# Patient Record
Sex: FEMALE | Race: WHITE | NOT HISPANIC OR LATINO | URBAN - METROPOLITAN AREA
[De-identification: names, ages, dates, MRNs, and addresses within clinical notes are randomized per-mention and may not be internally consistent; named-entity substitution may affect disease eponyms.]

---

## 2017-10-10 ENCOUNTER — RECORDS - HEALTHEAST (OUTPATIENT)
Dept: LAB | Facility: CLINIC | Age: 82
End: 2017-10-10

## 2017-10-10 LAB
CHOLEST SERPL-MCNC: 181 MG/DL
FASTING STATUS PATIENT QL REPORTED: NO
HDLC SERPL-MCNC: 59 MG/DL
LDLC SERPL CALC-MCNC: 98 MG/DL
TRIGL SERPL-MCNC: 121 MG/DL

## 2018-10-11 ENCOUNTER — RECORDS - HEALTHEAST (OUTPATIENT)
Dept: LAB | Facility: CLINIC | Age: 83
End: 2018-10-11

## 2018-10-11 LAB
ALBUMIN SERPL-MCNC: 3.6 G/DL (ref 3.5–5)
ALP SERPL-CCNC: 106 U/L (ref 45–120)
ALT SERPL W P-5'-P-CCNC: 24 U/L (ref 0–45)
ANION GAP SERPL CALCULATED.3IONS-SCNC: 12 MMOL/L (ref 5–18)
AST SERPL W P-5'-P-CCNC: 36 U/L (ref 0–40)
BILIRUB SERPL-MCNC: 0.9 MG/DL (ref 0–1)
BUN SERPL-MCNC: 31 MG/DL (ref 8–28)
CALCIUM SERPL-MCNC: 10 MG/DL (ref 8.5–10.5)
CHLORIDE BLD-SCNC: 104 MMOL/L (ref 98–107)
CHOLEST SERPL-MCNC: 179 MG/DL
CO2 SERPL-SCNC: 25 MMOL/L (ref 22–31)
CREAT SERPL-MCNC: 1.28 MG/DL (ref 0.6–1.1)
FASTING STATUS PATIENT QL REPORTED: NO
GFR SERPL CREATININE-BSD FRML MDRD: 40 ML/MIN/1.73M2
GLUCOSE BLD-MCNC: 156 MG/DL (ref 70–125)
HDLC SERPL-MCNC: 59 MG/DL
LDLC SERPL CALC-MCNC: 94 MG/DL
POTASSIUM BLD-SCNC: 4.1 MMOL/L (ref 3.5–5)
PROT SERPL-MCNC: 7.9 G/DL (ref 6–8)
SODIUM SERPL-SCNC: 141 MMOL/L (ref 136–145)
TRIGL SERPL-MCNC: 129 MG/DL

## 2019-04-25 ENCOUNTER — RECORDS - HEALTHEAST (OUTPATIENT)
Dept: LAB | Facility: CLINIC | Age: 84
End: 2019-04-25

## 2019-04-25 LAB
BASOPHILS # BLD AUTO: 0 THOU/UL (ref 0–0.2)
BASOPHILS NFR BLD AUTO: 0 % (ref 0–2)
EOSINOPHIL # BLD AUTO: 0.2 THOU/UL (ref 0–0.4)
EOSINOPHIL NFR BLD AUTO: 3 % (ref 0–6)
ERYTHROCYTE [DISTWIDTH] IN BLOOD BY AUTOMATED COUNT: 14.2 % (ref 11–14.5)
FOLATE SERPL-MCNC: >20 NG/ML
HCT VFR BLD AUTO: 33.5 % (ref 35–47)
HGB BLD-MCNC: 11.4 G/DL (ref 12–16)
LYMPHOCYTES # BLD AUTO: 1.1 THOU/UL (ref 0.8–4.4)
LYMPHOCYTES NFR BLD AUTO: 23 % (ref 20–40)
MCH RBC QN AUTO: 34.8 PG (ref 27–34)
MCHC RBC AUTO-ENTMCNC: 34 G/DL (ref 32–36)
MCV RBC AUTO: 102 FL (ref 80–100)
MONOCYTES # BLD AUTO: 0.5 THOU/UL (ref 0–0.9)
MONOCYTES NFR BLD AUTO: 10 % (ref 2–10)
NEUTROPHILS # BLD AUTO: 3 THOU/UL (ref 2–7.7)
NEUTROPHILS NFR BLD AUTO: 64 % (ref 50–70)
PLATELET # BLD AUTO: 133 THOU/UL (ref 140–440)
PMV BLD AUTO: 10.5 FL (ref 8.5–12.5)
RBC # BLD AUTO: 3.28 MILL/UL (ref 3.8–5.4)
TSH SERPL DL<=0.005 MIU/L-ACNC: 1.91 UIU/ML (ref 0.3–5)
VIT B12 SERPL-MCNC: 613 PG/ML (ref 213–816)
WBC: 4.7 THOU/UL (ref 4–11)

## 2019-07-30 ENCOUNTER — RECORDS - HEALTHEAST (OUTPATIENT)
Dept: LAB | Facility: CLINIC | Age: 84
End: 2019-07-30

## 2019-07-30 LAB
ANION GAP SERPL CALCULATED.3IONS-SCNC: 9 MMOL/L (ref 5–18)
BUN SERPL-MCNC: 22 MG/DL (ref 8–28)
CALCIUM SERPL-MCNC: 9.9 MG/DL (ref 8.5–10.5)
CHLORIDE BLD-SCNC: 103 MMOL/L (ref 98–107)
CO2 SERPL-SCNC: 26 MMOL/L (ref 22–31)
CREAT SERPL-MCNC: 1.16 MG/DL (ref 0.6–1.1)
GFR SERPL CREATININE-BSD FRML MDRD: 45 ML/MIN/1.73M2
GLUCOSE BLD-MCNC: 126 MG/DL (ref 70–125)
POTASSIUM BLD-SCNC: 4 MMOL/L (ref 3.5–5)
SODIUM SERPL-SCNC: 138 MMOL/L (ref 136–145)

## 2019-10-24 ENCOUNTER — RECORDS - HEALTHEAST (OUTPATIENT)
Dept: LAB | Facility: CLINIC | Age: 84
End: 2019-10-24

## 2019-10-28 LAB — ANA SER QL: 2.8 U

## 2020-01-04 ENCOUNTER — ANESTHESIA - HEALTHEAST (OUTPATIENT)
Dept: SURGERY | Facility: HOSPITAL | Age: 85
End: 2020-01-04

## 2020-01-04 ENCOUNTER — SURGERY - HEALTHEAST (OUTPATIENT)
Dept: SURGERY | Facility: HOSPITAL | Age: 85
End: 2020-01-04

## 2020-01-04 ASSESSMENT — MIFFLIN-ST. JEOR
SCORE: 1223.33
SCORE: 1073.19

## 2020-01-05 ENCOUNTER — AMBULATORY - HEALTHEAST (OUTPATIENT)
Dept: OTHER | Facility: CLINIC | Age: 85
End: 2020-01-05

## 2020-01-08 ENCOUNTER — OFFICE VISIT - HEALTHEAST (OUTPATIENT)
Dept: GERIATRICS | Facility: CLINIC | Age: 85
End: 2020-01-08

## 2020-01-08 DIAGNOSIS — I10 ACCELERATED HYPERTENSION: ICD-10-CM

## 2020-01-08 DIAGNOSIS — Z79.01 ANTICOAGULATED: ICD-10-CM

## 2020-01-08 DIAGNOSIS — T14.8XXA OPEN WOUND: ICD-10-CM

## 2020-01-08 DIAGNOSIS — R52 PAIN MANAGEMENT: ICD-10-CM

## 2020-01-10 ENCOUNTER — RECORDS - HEALTHEAST (OUTPATIENT)
Dept: LAB | Facility: CLINIC | Age: 85
End: 2020-01-10

## 2020-01-13 ENCOUNTER — OFFICE VISIT - HEALTHEAST (OUTPATIENT)
Dept: GERIATRICS | Facility: CLINIC | Age: 85
End: 2020-01-13

## 2020-01-13 DIAGNOSIS — I10 ACCELERATED HYPERTENSION: ICD-10-CM

## 2020-01-13 DIAGNOSIS — T14.8XXA OPEN WOUND: ICD-10-CM

## 2020-01-13 DIAGNOSIS — Z79.01 ANTICOAGULATED: ICD-10-CM

## 2020-01-13 DIAGNOSIS — R52 PAIN MANAGEMENT: ICD-10-CM

## 2020-01-13 LAB
ANION GAP SERPL CALCULATED.3IONS-SCNC: 8 MMOL/L (ref 5–18)
BUN SERPL-MCNC: 23 MG/DL (ref 8–28)
CALCIUM SERPL-MCNC: 8.8 MG/DL (ref 8.5–10.5)
CHLORIDE BLD-SCNC: 106 MMOL/L (ref 98–107)
CK SERPL-CCNC: 48 U/L (ref 30–190)
CO2 SERPL-SCNC: 23 MMOL/L (ref 22–31)
CREAT SERPL-MCNC: 0.8 MG/DL (ref 0.6–1.1)
GFR SERPL CREATININE-BSD FRML MDRD: >60 ML/MIN/1.73M2
GLUCOSE BLD-MCNC: 92 MG/DL (ref 70–125)
POTASSIUM BLD-SCNC: 4 MMOL/L (ref 3.5–5)
SODIUM SERPL-SCNC: 137 MMOL/L (ref 136–145)

## 2020-01-17 ENCOUNTER — OFFICE VISIT - HEALTHEAST (OUTPATIENT)
Dept: GERIATRICS | Facility: CLINIC | Age: 85
End: 2020-01-17

## 2020-01-17 DIAGNOSIS — S73.004S: ICD-10-CM

## 2020-01-17 DIAGNOSIS — R52 PAIN MANAGEMENT: ICD-10-CM

## 2020-01-17 DIAGNOSIS — Z79.01 ANTICOAGULATED: ICD-10-CM

## 2020-01-17 DIAGNOSIS — I15.9 SECONDARY HYPERTENSION: ICD-10-CM

## 2020-01-21 ENCOUNTER — OFFICE VISIT - HEALTHEAST (OUTPATIENT)
Dept: GERIATRICS | Facility: CLINIC | Age: 85
End: 2020-01-21

## 2020-01-21 DIAGNOSIS — S73.004S: ICD-10-CM

## 2020-01-21 DIAGNOSIS — I10 ACCELERATED HYPERTENSION: ICD-10-CM

## 2020-01-21 DIAGNOSIS — R52 PAIN MANAGEMENT: ICD-10-CM

## 2020-01-29 ENCOUNTER — OFFICE VISIT - HEALTHEAST (OUTPATIENT)
Dept: GERIATRICS | Facility: CLINIC | Age: 85
End: 2020-01-29

## 2020-01-29 DIAGNOSIS — T79.6XXD TRAUMATIC RHABDOMYOLYSIS, SUBSEQUENT ENCOUNTER: ICD-10-CM

## 2020-01-29 DIAGNOSIS — I10 ACCELERATED HYPERTENSION: ICD-10-CM

## 2020-01-29 DIAGNOSIS — S73.014A POSTERIOR DISLOCATION OF RIGHT HIP, INITIAL ENCOUNTER (H): ICD-10-CM

## 2020-01-29 DIAGNOSIS — R54 AGE-RELATED PHYSICAL DEBILITY: ICD-10-CM

## 2020-01-30 ENCOUNTER — OFFICE VISIT - HEALTHEAST (OUTPATIENT)
Dept: GERIATRICS | Facility: CLINIC | Age: 85
End: 2020-01-30

## 2020-01-30 DIAGNOSIS — S73.004D DISLOCATION OF RIGHT HIP, SUBSEQUENT ENCOUNTER: ICD-10-CM

## 2020-01-30 DIAGNOSIS — T79.6XXD TRAUMATIC RHABDOMYOLYSIS, SUBSEQUENT ENCOUNTER: ICD-10-CM

## 2020-01-30 DIAGNOSIS — I10 ESSENTIAL HYPERTENSION: ICD-10-CM

## 2020-01-30 DIAGNOSIS — D64.9 ANEMIA, UNSPECIFIED TYPE: ICD-10-CM

## 2020-02-04 ENCOUNTER — OFFICE VISIT - HEALTHEAST (OUTPATIENT)
Dept: GERIATRICS | Facility: CLINIC | Age: 85
End: 2020-02-04

## 2020-02-04 DIAGNOSIS — R52 PAIN MANAGEMENT: ICD-10-CM

## 2020-02-04 DIAGNOSIS — Z79.01 ANTICOAGULATION ADEQUATE: ICD-10-CM

## 2020-02-04 DIAGNOSIS — S73.014A POSTERIOR DISLOCATION OF RIGHT HIP, INITIAL ENCOUNTER (H): ICD-10-CM

## 2020-02-11 ENCOUNTER — OFFICE VISIT - HEALTHEAST (OUTPATIENT)
Dept: GERIATRICS | Facility: CLINIC | Age: 85
End: 2020-02-11

## 2020-02-11 DIAGNOSIS — S73.014A POSTERIOR DISLOCATION OF RIGHT HIP, INITIAL ENCOUNTER (H): ICD-10-CM

## 2020-02-11 DIAGNOSIS — L89.153 PRESSURE INJURY OF SACRAL REGION, STAGE 3 (H): ICD-10-CM

## 2020-02-11 DIAGNOSIS — R52 PAIN MANAGEMENT: ICD-10-CM

## 2020-02-13 ENCOUNTER — OFFICE VISIT - HEALTHEAST (OUTPATIENT)
Dept: GERIATRICS | Facility: CLINIC | Age: 85
End: 2020-02-13

## 2020-02-13 DIAGNOSIS — T14.8XXA BRUISE: ICD-10-CM

## 2020-02-18 ENCOUNTER — OFFICE VISIT - HEALTHEAST (OUTPATIENT)
Dept: GERIATRICS | Facility: CLINIC | Age: 85
End: 2020-02-18

## 2020-02-18 DIAGNOSIS — R53.81 DEBILITY: ICD-10-CM

## 2020-02-18 DIAGNOSIS — R52 PAIN MANAGEMENT: ICD-10-CM

## 2020-02-18 DIAGNOSIS — S73.014A POSTERIOR DISLOCATION OF RIGHT HIP, INITIAL ENCOUNTER (H): ICD-10-CM

## 2020-02-25 ENCOUNTER — OFFICE VISIT - HEALTHEAST (OUTPATIENT)
Dept: GERIATRICS | Facility: CLINIC | Age: 85
End: 2020-02-25

## 2020-02-25 DIAGNOSIS — S73.014A POSTERIOR DISLOCATION OF RIGHT HIP, INITIAL ENCOUNTER (H): ICD-10-CM

## 2020-02-25 DIAGNOSIS — R52 PAIN MANAGEMENT: ICD-10-CM

## 2020-02-25 DIAGNOSIS — Z71.89 ENCOUNTER FOR HOME SAFETY REVIEW FOR INJURY PREVENTION: ICD-10-CM

## 2020-03-04 ENCOUNTER — COMMUNICATION - HEALTHEAST (OUTPATIENT)
Dept: GERIATRICS | Facility: CLINIC | Age: 85
End: 2020-03-04

## 2020-03-04 ENCOUNTER — RECORDS - HEALTHEAST (OUTPATIENT)
Dept: LAB | Facility: CLINIC | Age: 85
End: 2020-03-04

## 2020-03-04 ENCOUNTER — OFFICE VISIT - HEALTHEAST (OUTPATIENT)
Dept: GERIATRICS | Facility: CLINIC | Age: 85
End: 2020-03-04

## 2020-03-04 DIAGNOSIS — S73.014A POSTERIOR DISLOCATION OF RIGHT HIP, INITIAL ENCOUNTER (H): ICD-10-CM

## 2020-03-04 DIAGNOSIS — I10 ACCELERATED HYPERTENSION: ICD-10-CM

## 2020-03-04 DIAGNOSIS — R50.9 FEVER, UNSPECIFIED FEVER CAUSE: ICD-10-CM

## 2020-03-04 DIAGNOSIS — R52 PAIN MANAGEMENT: ICD-10-CM

## 2020-03-04 LAB
ALBUMIN UR-MCNC: ABNORMAL MG/DL
ANION GAP SERPL CALCULATED.3IONS-SCNC: 10 MMOL/L (ref 5–18)
APPEARANCE UR: ABNORMAL
BACTERIA #/AREA URNS HPF: ABNORMAL HPF
BILIRUB UR QL STRIP: NEGATIVE
BUN SERPL-MCNC: 38 MG/DL (ref 8–28)
CALCIUM SERPL-MCNC: 9.8 MG/DL (ref 8.5–10.5)
CHLORIDE BLD-SCNC: 101 MMOL/L (ref 98–107)
CK SERPL-CCNC: 26 U/L (ref 30–190)
CO2 SERPL-SCNC: 24 MMOL/L (ref 22–31)
COLOR UR AUTO: YELLOW
CREAT SERPL-MCNC: 1.18 MG/DL (ref 0.6–1.1)
ERYTHROCYTE [DISTWIDTH] IN BLOOD BY AUTOMATED COUNT: 15.8 % (ref 11–14.5)
GFR SERPL CREATININE-BSD FRML MDRD: 44 ML/MIN/1.73M2
GLUCOSE BLD-MCNC: 121 MG/DL (ref 70–125)
GLUCOSE UR STRIP-MCNC: NEGATIVE MG/DL
HCT VFR BLD AUTO: 30.7 % (ref 35–47)
HGB BLD-MCNC: 10.2 G/DL (ref 12–16)
HGB UR QL STRIP: ABNORMAL
HYALINE CASTS #/AREA URNS LPF: ABNORMAL LPF
KETONES UR STRIP-MCNC: NEGATIVE MG/DL
LEUKOCYTE ESTERASE UR QL STRIP: ABNORMAL
MCH RBC QN AUTO: 35.5 PG (ref 27–34)
MCHC RBC AUTO-ENTMCNC: 33.2 G/DL (ref 32–36)
MCV RBC AUTO: 107 FL (ref 80–100)
MUCOUS THREADS #/AREA URNS LPF: ABNORMAL LPF
NITRATE UR QL: NEGATIVE
PH UR STRIP: 5 [PH] (ref 4.5–8)
PLATELET # BLD AUTO: 145 THOU/UL (ref 140–440)
PMV BLD AUTO: 10.5 FL (ref 8.5–12.5)
POTASSIUM BLD-SCNC: 3.8 MMOL/L (ref 3.5–5)
RBC # BLD AUTO: 2.87 MILL/UL (ref 3.8–5.4)
RBC #/AREA URNS AUTO: ABNORMAL HPF
SODIUM SERPL-SCNC: 135 MMOL/L (ref 136–145)
SP GR UR STRIP: 1.02 (ref 1–1.03)
SQUAMOUS #/AREA URNS AUTO: ABNORMAL LPF
UROBILINOGEN UR STRIP-ACNC: ABNORMAL
WBC #/AREA URNS AUTO: >100 HPF
WBC CLUMPS #/AREA URNS HPF: PRESENT /[HPF]
WBC: 13.3 THOU/UL (ref 4–11)

## 2020-03-06 ENCOUNTER — OFFICE VISIT - HEALTHEAST (OUTPATIENT)
Dept: GERIATRICS | Facility: CLINIC | Age: 85
End: 2020-03-06

## 2020-03-06 ENCOUNTER — RECORDS - HEALTHEAST (OUTPATIENT)
Dept: LAB | Facility: CLINIC | Age: 85
End: 2020-03-06

## 2020-03-06 DIAGNOSIS — N39.0 URINARY TRACT INFECTION WITHOUT HEMATURIA, SITE UNSPECIFIED: ICD-10-CM

## 2020-03-06 LAB — BACTERIA SPEC CULT: ABNORMAL

## 2020-03-08 ENCOUNTER — RECORDS - HEALTHEAST (OUTPATIENT)
Dept: LAB | Facility: CLINIC | Age: 85
End: 2020-03-08

## 2020-03-08 LAB — URATE SERPL-MCNC: 6.8 MG/DL (ref 2–7.5)

## 2020-03-09 ENCOUNTER — OFFICE VISIT - HEALTHEAST (OUTPATIENT)
Dept: GERIATRICS | Facility: CLINIC | Age: 85
End: 2020-03-09

## 2020-03-09 DIAGNOSIS — R53.81 DEBILITY: ICD-10-CM

## 2020-03-09 DIAGNOSIS — S73.014A POSTERIOR DISLOCATION OF RIGHT HIP, INITIAL ENCOUNTER (H): ICD-10-CM

## 2020-03-09 DIAGNOSIS — L03.115 CELLULITIS OF RIGHT LOWER EXTREMITY: ICD-10-CM

## 2020-03-09 DIAGNOSIS — R52 PAIN MANAGEMENT: ICD-10-CM

## 2020-03-09 LAB
ANION GAP SERPL CALCULATED.3IONS-SCNC: 9 MMOL/L (ref 5–18)
BUN SERPL-MCNC: 34 MG/DL (ref 8–28)
CALCIUM SERPL-MCNC: 9.3 MG/DL (ref 8.5–10.5)
CHLORIDE BLD-SCNC: 103 MMOL/L (ref 98–107)
CO2 SERPL-SCNC: 23 MMOL/L (ref 22–31)
CREAT SERPL-MCNC: 1.41 MG/DL (ref 0.6–1.1)
GFR SERPL CREATININE-BSD FRML MDRD: 35 ML/MIN/1.73M2
GLUCOSE BLD-MCNC: 87 MG/DL (ref 70–125)
POTASSIUM BLD-SCNC: 4.8 MMOL/L (ref 3.5–5)
SODIUM SERPL-SCNC: 135 MMOL/L (ref 136–145)

## 2020-03-10 ENCOUNTER — RECORDS - HEALTHEAST (OUTPATIENT)
Dept: LAB | Facility: CLINIC | Age: 85
End: 2020-03-10

## 2020-03-10 LAB
ANION GAP SERPL CALCULATED.3IONS-SCNC: 7 MMOL/L (ref 5–18)
BUN SERPL-MCNC: 36 MG/DL (ref 8–28)
CALCIUM SERPL-MCNC: 9.3 MG/DL (ref 8.5–10.5)
CHLORIDE BLD-SCNC: 106 MMOL/L (ref 98–107)
CO2 SERPL-SCNC: 24 MMOL/L (ref 22–31)
CREAT SERPL-MCNC: 1.5 MG/DL (ref 0.6–1.1)
GFR SERPL CREATININE-BSD FRML MDRD: 33 ML/MIN/1.73M2
GLUCOSE BLD-MCNC: 82 MG/DL (ref 70–125)
POTASSIUM BLD-SCNC: 5 MMOL/L (ref 3.5–5)
SODIUM SERPL-SCNC: 137 MMOL/L (ref 136–145)

## 2020-03-11 ENCOUNTER — OFFICE VISIT - HEALTHEAST (OUTPATIENT)
Dept: GERIATRICS | Facility: CLINIC | Age: 85
End: 2020-03-11

## 2020-03-11 ENCOUNTER — RECORDS - HEALTHEAST (OUTPATIENT)
Dept: LAB | Facility: CLINIC | Age: 85
End: 2020-03-11

## 2020-03-11 DIAGNOSIS — L03.115 CELLULITIS OF RIGHT LOWER EXTREMITY: ICD-10-CM

## 2020-03-11 DIAGNOSIS — T79.6XXD TRAUMATIC RHABDOMYOLYSIS, SUBSEQUENT ENCOUNTER: ICD-10-CM

## 2020-03-11 LAB
ANION GAP SERPL CALCULATED.3IONS-SCNC: 7 MMOL/L (ref 5–18)
BUN SERPL-MCNC: 35 MG/DL (ref 8–28)
CALCIUM SERPL-MCNC: 9.4 MG/DL (ref 8.5–10.5)
CHLORIDE BLD-SCNC: 103 MMOL/L (ref 98–107)
CO2 SERPL-SCNC: 23 MMOL/L (ref 22–31)
CREAT SERPL-MCNC: 1.28 MG/DL (ref 0.6–1.1)
GFR SERPL CREATININE-BSD FRML MDRD: 40 ML/MIN/1.73M2
GLUCOSE BLD-MCNC: 86 MG/DL (ref 70–125)
POTASSIUM BLD-SCNC: 4.7 MMOL/L (ref 3.5–5)
SODIUM SERPL-SCNC: 133 MMOL/L (ref 136–145)

## 2020-03-11 RX ORDER — NYSTATIN 100000/G
1 POWDER (GRAM) TOPICAL 2 TIMES DAILY PRN
Status: SHIPPED | COMMUNITY
Start: 2020-03-11

## 2020-03-12 ENCOUNTER — AMBULATORY - HEALTHEAST (OUTPATIENT)
Dept: GERIATRICS | Facility: CLINIC | Age: 85
End: 2020-03-12

## 2020-04-16 ENCOUNTER — RECORDS - HEALTHEAST (OUTPATIENT)
Dept: LAB | Facility: CLINIC | Age: 85
End: 2020-04-16

## 2020-04-17 ENCOUNTER — OFFICE VISIT - HEALTHEAST (OUTPATIENT)
Dept: GERIATRICS | Facility: CLINIC | Age: 85
End: 2020-04-17

## 2020-04-17 DIAGNOSIS — L03.115 CELLULITIS OF RIGHT LOWER EXTREMITY: ICD-10-CM

## 2020-04-17 DIAGNOSIS — E87.8 ELECTROLYTE DISTURBANCE: ICD-10-CM

## 2020-04-17 DIAGNOSIS — L89.153 PRESSURE INJURY OF SACRAL REGION, STAGE 3 (H): ICD-10-CM

## 2020-04-17 DIAGNOSIS — T14.8XXA OPEN WOUND: ICD-10-CM

## 2020-04-17 LAB
ANION GAP SERPL CALCULATED.3IONS-SCNC: 9 MMOL/L (ref 5–18)
BUN SERPL-MCNC: 42 MG/DL (ref 8–28)
CALCIUM SERPL-MCNC: 8.7 MG/DL (ref 8.5–10.5)
CHLORIDE BLD-SCNC: 102 MMOL/L (ref 98–107)
CO2 SERPL-SCNC: 22 MMOL/L (ref 22–31)
CREAT SERPL-MCNC: 1.01 MG/DL (ref 0.6–1.1)
ERYTHROCYTE [DISTWIDTH] IN BLOOD BY AUTOMATED COUNT: 14.9 % (ref 11–14.5)
GFR SERPL CREATININE-BSD FRML MDRD: 52 ML/MIN/1.73M2
GLUCOSE BLD-MCNC: 92 MG/DL (ref 70–125)
HCT VFR BLD AUTO: 26.5 % (ref 35–47)
HGB BLD-MCNC: 8.5 G/DL (ref 12–16)
MAGNESIUM SERPL-MCNC: 1.7 MG/DL (ref 1.8–2.6)
MCH RBC QN AUTO: 35 PG (ref 27–34)
MCHC RBC AUTO-ENTMCNC: 32.1 G/DL (ref 32–36)
MCV RBC AUTO: 109 FL (ref 80–100)
PLATELET # BLD AUTO: 156 THOU/UL (ref 140–440)
PMV BLD AUTO: 11.2 FL (ref 8.5–12.5)
POTASSIUM BLD-SCNC: 4.9 MMOL/L (ref 3.5–5)
RBC # BLD AUTO: 2.43 MILL/UL (ref 3.8–5.4)
SODIUM SERPL-SCNC: 133 MMOL/L (ref 136–145)
WBC: 5.9 THOU/UL (ref 4–11)

## 2020-04-20 ENCOUNTER — OFFICE VISIT - HEALTHEAST (OUTPATIENT)
Dept: GERIATRICS | Facility: CLINIC | Age: 85
End: 2020-04-20

## 2020-04-20 DIAGNOSIS — L02.415 ABSCESS OF RIGHT LEG: ICD-10-CM

## 2020-04-20 DIAGNOSIS — D63.1 ANEMIA DUE TO STAGE 3 CHRONIC KIDNEY DISEASE (H): ICD-10-CM

## 2020-04-20 DIAGNOSIS — N18.30 ANEMIA DUE TO STAGE 3 CHRONIC KIDNEY DISEASE (H): ICD-10-CM

## 2020-04-20 DIAGNOSIS — S82.892D CLOSED FRACTURE OF LEFT ANKLE WITH ROUTINE HEALING, SUBSEQUENT ENCOUNTER: ICD-10-CM

## 2020-04-20 DIAGNOSIS — L72.3 SEBACEOUS CYST: ICD-10-CM

## 2020-04-20 DIAGNOSIS — L89.153 PRESSURE ULCER OF COCCYGEAL REGION, STAGE 3 (H): ICD-10-CM

## 2020-04-22 ENCOUNTER — RECORDS - HEALTHEAST (OUTPATIENT)
Dept: LAB | Facility: CLINIC | Age: 85
End: 2020-04-22

## 2020-04-23 ENCOUNTER — COMMUNICATION - HEALTHEAST (OUTPATIENT)
Dept: GERIATRICS | Facility: CLINIC | Age: 85
End: 2020-04-23

## 2020-04-23 LAB
FERRITIN SERPL-MCNC: 335 NG/ML (ref 10–130)
FOLATE SERPL-MCNC: 15.5 NG/ML
IRON SATN MFR SERPL: 34 % (ref 20–50)
IRON SERPL-MCNC: 81 UG/DL (ref 42–175)
MAGNESIUM SERPL-MCNC: 2.1 MG/DL (ref 1.8–2.6)
SODIUM SERPL-SCNC: 134 MMOL/L (ref 136–145)
TIBC SERPL-MCNC: 241 UG/DL (ref 313–563)
TRANSFERRIN SERPL-MCNC: 193 MG/DL (ref 212–360)
TSH SERPL DL<=0.005 MIU/L-ACNC: 1.82 UIU/ML (ref 0.3–5)
VIT B12 SERPL-MCNC: 482 PG/ML (ref 213–816)

## 2020-04-23 RX ORDER — MENTHOL 40 MG/ML
1 GEL TOPICAL 3 TIMES DAILY PRN
Status: SHIPPED | COMMUNITY
Start: 2020-04-23

## 2020-04-24 ENCOUNTER — OFFICE VISIT - HEALTHEAST (OUTPATIENT)
Dept: GERIATRICS | Facility: CLINIC | Age: 85
End: 2020-04-24

## 2020-04-24 ENCOUNTER — RECORDS - HEALTHEAST (OUTPATIENT)
Dept: LAB | Facility: CLINIC | Age: 85
End: 2020-04-24

## 2020-04-24 DIAGNOSIS — S82.832D CLOSED FRACTURE OF DISTAL END OF LEFT FIBULA WITH ROUTINE HEALING, UNSPECIFIED FRACTURE MORPHOLOGY, SUBSEQUENT ENCOUNTER: ICD-10-CM

## 2020-04-24 RX ORDER — FERROUS SULFATE 325(65) MG
1 TABLET ORAL
Status: SHIPPED | COMMUNITY
Start: 2020-04-24

## 2020-04-27 ENCOUNTER — COMMUNICATION - HEALTHEAST (OUTPATIENT)
Dept: GERIATRICS | Facility: CLINIC | Age: 85
End: 2020-04-27

## 2020-04-27 LAB — HGB BLD-MCNC: 10 G/DL (ref 12–16)

## 2020-04-30 ENCOUNTER — OFFICE VISIT - HEALTHEAST (OUTPATIENT)
Dept: GERIATRICS | Facility: CLINIC | Age: 85
End: 2020-04-30

## 2020-04-30 DIAGNOSIS — D50.0 BLOOD LOSS ANEMIA: ICD-10-CM

## 2020-04-30 DIAGNOSIS — R19.5 LOOSE STOOLS: ICD-10-CM

## 2020-04-30 DIAGNOSIS — R52 PAIN MANAGEMENT: ICD-10-CM

## 2020-04-30 DIAGNOSIS — R63.4 WEIGHT LOSS: ICD-10-CM

## 2020-04-30 DIAGNOSIS — S82.832D CLOSED FRACTURE OF DISTAL END OF LEFT FIBULA WITH ROUTINE HEALING, UNSPECIFIED FRACTURE MORPHOLOGY, SUBSEQUENT ENCOUNTER: ICD-10-CM

## 2020-04-30 RX ORDER — ACETAMINOPHEN 325 MG/1
TABLET ORAL EVERY 4 HOURS PRN
Status: SHIPPED | COMMUNITY
Start: 2020-04-30

## 2020-05-05 ENCOUNTER — OFFICE VISIT - HEALTHEAST (OUTPATIENT)
Dept: GERIATRICS | Facility: CLINIC | Age: 85
End: 2020-05-05

## 2020-05-05 DIAGNOSIS — R53.81 DEBILITY: ICD-10-CM

## 2020-05-05 DIAGNOSIS — R00.1 BRADYCARDIA: ICD-10-CM

## 2020-05-05 DIAGNOSIS — S82.832D CLOSED FRACTURE OF DISTAL END OF LEFT FIBULA WITH ROUTINE HEALING, UNSPECIFIED FRACTURE MORPHOLOGY, SUBSEQUENT ENCOUNTER: ICD-10-CM

## 2020-05-05 DIAGNOSIS — R63.4 WEIGHT LOSS: ICD-10-CM

## 2020-05-05 DIAGNOSIS — R19.5 LOOSE STOOLS: ICD-10-CM

## 2020-05-07 ENCOUNTER — OFFICE VISIT - HEALTHEAST (OUTPATIENT)
Dept: GERIATRICS | Facility: CLINIC | Age: 85
End: 2020-05-07

## 2020-05-07 DIAGNOSIS — R63.4 WEIGHT LOSS: ICD-10-CM

## 2020-05-07 DIAGNOSIS — R19.5 LOOSE STOOLS: ICD-10-CM

## 2020-05-07 DIAGNOSIS — S82.832D CLOSED FRACTURE OF DISTAL END OF LEFT FIBULA WITH ROUTINE HEALING, UNSPECIFIED FRACTURE MORPHOLOGY, SUBSEQUENT ENCOUNTER: ICD-10-CM

## 2020-05-07 DIAGNOSIS — I10 ESSENTIAL HYPERTENSION: ICD-10-CM

## 2020-05-07 DIAGNOSIS — R00.1 BRADYCARDIA: ICD-10-CM

## 2020-05-26 ENCOUNTER — OFFICE VISIT - HEALTHEAST (OUTPATIENT)
Dept: GERIATRICS | Facility: CLINIC | Age: 85
End: 2020-05-26

## 2020-05-26 DIAGNOSIS — I10 ESSENTIAL HYPERTENSION: ICD-10-CM

## 2020-05-26 DIAGNOSIS — R63.4 WEIGHT LOSS: ICD-10-CM

## 2020-05-26 DIAGNOSIS — R52 PAIN MANAGEMENT: ICD-10-CM

## 2020-05-26 DIAGNOSIS — L89.153 PRESSURE ULCER OF COCCYGEAL REGION, STAGE 3 (H): ICD-10-CM

## 2020-05-26 DIAGNOSIS — T14.8XXA OPEN WOUND: ICD-10-CM

## 2020-05-26 DIAGNOSIS — S82.832D CLOSED FRACTURE OF DISTAL END OF LEFT FIBULA WITH ROUTINE HEALING, UNSPECIFIED FRACTURE MORPHOLOGY, SUBSEQUENT ENCOUNTER: ICD-10-CM

## 2020-06-08 ENCOUNTER — AMBULATORY - HEALTHEAST (OUTPATIENT)
Dept: GERIATRICS | Facility: CLINIC | Age: 85
End: 2020-06-08

## 2020-07-21 ENCOUNTER — AMBULATORY - HEALTHEAST (OUTPATIENT)
Dept: VASCULAR SURGERY | Facility: CLINIC | Age: 85
End: 2020-07-21

## 2020-07-21 DIAGNOSIS — S71.101A OPEN WOUND OF RIGHT THIGH: ICD-10-CM

## 2020-07-21 DIAGNOSIS — S39.92XA: ICD-10-CM

## 2020-10-19 ENCOUNTER — RECORDS - HEALTHEAST (OUTPATIENT)
Dept: LAB | Facility: CLINIC | Age: 85
End: 2020-10-19

## 2020-10-20 LAB
ANION GAP SERPL CALCULATED.3IONS-SCNC: 10 MMOL/L (ref 5–18)
BUN SERPL-MCNC: 72 MG/DL (ref 8–28)
CALCIUM SERPL-MCNC: 9.4 MG/DL (ref 8.5–10.5)
CHLORIDE BLD-SCNC: 105 MMOL/L (ref 98–107)
CO2 SERPL-SCNC: 25 MMOL/L (ref 22–31)
CREAT SERPL-MCNC: 1.57 MG/DL (ref 0.6–1.1)
GFR SERPL CREATININE-BSD FRML MDRD: 31 ML/MIN/1.73M2
GLUCOSE BLD-MCNC: 126 MG/DL (ref 70–125)
POTASSIUM BLD-SCNC: 4.8 MMOL/L (ref 3.5–5)
SODIUM SERPL-SCNC: 140 MMOL/L (ref 136–145)

## 2021-05-25 ENCOUNTER — RECORDS - HEALTHEAST (OUTPATIENT)
Dept: ADMINISTRATIVE | Facility: CLINIC | Age: 86
End: 2021-05-25

## 2021-05-26 ENCOUNTER — RECORDS - HEALTHEAST (OUTPATIENT)
Dept: ADMINISTRATIVE | Facility: CLINIC | Age: 86
End: 2021-05-26

## 2021-05-27 ENCOUNTER — RECORDS - HEALTHEAST (OUTPATIENT)
Dept: ADMINISTRATIVE | Facility: CLINIC | Age: 86
End: 2021-05-27

## 2021-05-27 VITALS
TEMPERATURE: 98.5 F | DIASTOLIC BLOOD PRESSURE: 62 MMHG | OXYGEN SATURATION: 96 % | RESPIRATION RATE: 18 BRPM | SYSTOLIC BLOOD PRESSURE: 150 MMHG | HEART RATE: 69 BPM

## 2021-06-01 ENCOUNTER — RECORDS - HEALTHEAST (OUTPATIENT)
Dept: ADMINISTRATIVE | Facility: CLINIC | Age: 86
End: 2021-06-01

## 2021-06-04 VITALS
DIASTOLIC BLOOD PRESSURE: 81 MMHG | BODY MASS INDEX: 31.4 KG/M2 | SYSTOLIC BLOOD PRESSURE: 153 MMHG | HEART RATE: 66 BPM | WEIGHT: 160.8 LBS | TEMPERATURE: 98.6 F | RESPIRATION RATE: 18 BRPM | OXYGEN SATURATION: 97 %

## 2021-06-04 VITALS
DIASTOLIC BLOOD PRESSURE: 63 MMHG | SYSTOLIC BLOOD PRESSURE: 152 MMHG | HEART RATE: 58 BPM | RESPIRATION RATE: 18 BRPM | TEMPERATURE: 99 F | OXYGEN SATURATION: 98 % | WEIGHT: 160 LBS | BODY MASS INDEX: 31.25 KG/M2

## 2021-06-04 VITALS
RESPIRATION RATE: 18 BRPM | WEIGHT: 168.7 LBS | BODY MASS INDEX: 32.95 KG/M2 | DIASTOLIC BLOOD PRESSURE: 60 MMHG | SYSTOLIC BLOOD PRESSURE: 92 MMHG | TEMPERATURE: 98.3 F | OXYGEN SATURATION: 96 % | HEART RATE: 73 BPM

## 2021-06-04 VITALS
HEART RATE: 56 BPM | OXYGEN SATURATION: 96 % | TEMPERATURE: 98 F | DIASTOLIC BLOOD PRESSURE: 68 MMHG | WEIGHT: 163.4 LBS | SYSTOLIC BLOOD PRESSURE: 154 MMHG | BODY MASS INDEX: 31.91 KG/M2 | RESPIRATION RATE: 18 BRPM

## 2021-06-04 VITALS
WEIGHT: 162.8 LBS | HEART RATE: 76 BPM | BODY MASS INDEX: 31.79 KG/M2 | OXYGEN SATURATION: 18 % | DIASTOLIC BLOOD PRESSURE: 64 MMHG | SYSTOLIC BLOOD PRESSURE: 135 MMHG

## 2021-06-04 VITALS
DIASTOLIC BLOOD PRESSURE: 60 MMHG | WEIGHT: 163.4 LBS | TEMPERATURE: 98.6 F | HEART RATE: 54 BPM | RESPIRATION RATE: 18 BRPM | SYSTOLIC BLOOD PRESSURE: 134 MMHG | BODY MASS INDEX: 31.91 KG/M2 | OXYGEN SATURATION: 97 %

## 2021-06-04 VITALS
DIASTOLIC BLOOD PRESSURE: 52 MMHG | TEMPERATURE: 97.5 F | BODY MASS INDEX: 31.44 KG/M2 | OXYGEN SATURATION: 97 % | WEIGHT: 161 LBS | RESPIRATION RATE: 16 BRPM | SYSTOLIC BLOOD PRESSURE: 154 MMHG | HEART RATE: 52 BPM

## 2021-06-04 VITALS
SYSTOLIC BLOOD PRESSURE: 148 MMHG | DIASTOLIC BLOOD PRESSURE: 62 MMHG | BODY MASS INDEX: 31.05 KG/M2 | WEIGHT: 159 LBS | TEMPERATURE: 96.9 F | HEART RATE: 51 BPM | RESPIRATION RATE: 16 BRPM | OXYGEN SATURATION: 96 %

## 2021-06-04 VITALS
DIASTOLIC BLOOD PRESSURE: 67 MMHG | BODY MASS INDEX: 31.15 KG/M2 | WEIGHT: 159.5 LBS | RESPIRATION RATE: 18 BRPM | SYSTOLIC BLOOD PRESSURE: 146 MMHG | TEMPERATURE: 101 F | HEART RATE: 65 BPM | OXYGEN SATURATION: 97 %

## 2021-06-04 VITALS
HEART RATE: 64 BPM | TEMPERATURE: 97.1 F | RESPIRATION RATE: 18 BRPM | DIASTOLIC BLOOD PRESSURE: 63 MMHG | OXYGEN SATURATION: 97 % | WEIGHT: 162.2 LBS | BODY MASS INDEX: 31.68 KG/M2 | SYSTOLIC BLOOD PRESSURE: 151 MMHG

## 2021-06-04 VITALS
SYSTOLIC BLOOD PRESSURE: 149 MMHG | RESPIRATION RATE: 20 BRPM | TEMPERATURE: 98.7 F | HEART RATE: 66 BPM | OXYGEN SATURATION: 97 % | BODY MASS INDEX: 31.91 KG/M2 | DIASTOLIC BLOOD PRESSURE: 66 MMHG | WEIGHT: 163.4 LBS

## 2021-06-04 VITALS
HEART RATE: 57 BPM | RESPIRATION RATE: 18 BRPM | DIASTOLIC BLOOD PRESSURE: 60 MMHG | WEIGHT: 161.5 LBS | BODY MASS INDEX: 31.54 KG/M2 | TEMPERATURE: 98.7 F | SYSTOLIC BLOOD PRESSURE: 110 MMHG | OXYGEN SATURATION: 95 %

## 2021-06-04 VITALS
DIASTOLIC BLOOD PRESSURE: 60 MMHG | OXYGEN SATURATION: 95 % | RESPIRATION RATE: 16 BRPM | SYSTOLIC BLOOD PRESSURE: 142 MMHG | BODY MASS INDEX: 33.4 KG/M2 | TEMPERATURE: 97.7 F | WEIGHT: 171 LBS | HEART RATE: 59 BPM

## 2021-06-04 VITALS
WEIGHT: 163.2 LBS | OXYGEN SATURATION: 99 % | TEMPERATURE: 98.7 F | DIASTOLIC BLOOD PRESSURE: 80 MMHG | HEART RATE: 68 BPM | BODY MASS INDEX: 31.87 KG/M2 | RESPIRATION RATE: 18 BRPM | SYSTOLIC BLOOD PRESSURE: 140 MMHG

## 2021-06-04 VITALS — HEIGHT: 60 IN | WEIGHT: 156.9 LBS | BODY MASS INDEX: 30.8 KG/M2

## 2021-06-04 NOTE — ANESTHESIA PREPROCEDURE EVALUATION
Anesthesia Evaluation      Patient summary reviewed     Airway   Mallampati: IV   Pulmonary - negative ROS and normal exam                          Cardiovascular - normal exam  (+) hypertension, ,      Neuro/Psych - negative ROS     Endo/Other - negative ROS      GI/Hepatic/Renal - negative ROS   (+)   chronic renal disease ARF,      Other findings: Posterior dislocation of right hip with failed relocation attempt in ED today   Hgb 10.8  Plt 140  Cr 1.34  K 3.9      Dental    (+) poor dentition                       Anesthesia Plan  Planned anesthetic: general mask  Patient is NPO, per chart. Will plan for general mask anesthesia with propofol bolus with surgeon present in room. Will convert to GETA if paralysis is needed.     Fentanyl for analgesia  Zofran   Avoid toradol due to acute renal injury and age  ASA 3   Induction: intravenous   Anesthetic plan and risks discussed with: patient (Nephew present)    Post-op plan: routine recovery

## 2021-06-04 NOTE — PROGRESS NOTES
1/5/20  S: Patient is a 86 y/o female, 5', 190 lbs. seen at [facility], room 428, for the fitting and delivery of a  hip abduction orthosis for her right side on orders her Dr. Dariel Em MD for the treatment of DX: Right hip pain dislocation, right, initial encounter. Nurse Nancy RODRIGUEZ called to let us know that we had to fit patient for a right hip abduction brace, order were never sent to the on call. Nancy RODRIGUEZ stated that she needed a right hip abduction brace with flexion from 0 to 75 degrees and abduction of 15 degrees per doctors orders. Nancy RODRIGUEZ stated that she had a closed reduction of her right hip yesterday 1/4/20.    O: Patient presented in a supine position in her hospital bed at the time of my arrival talking with her nurse Nancy RODRIGUEZ. Patient was very alert throughout my visit today.  Nancy RODRIGUEZ stayed in the room and help me roll her in bed to fit the right hip abduction brace. Patient has kyphosis, short waisted due to her kyphosis and has a hernia that made fitting challenging.     G: Patient's hip abduction orthosis will assist in maintaining abduction and limiting flexion and extension of patient's hip in order to prevent future dislocation. Standard alignment of hip joint was used with specifics range of motion orders to be set at flexion of 0-75 degrees with 15 degrees of abduction angle of patient hip set for her orthosis.     A: Patient was initially measured to determine which size pelvic and thigh sections would properly fit her. I then assembled the patient's orthosis and set the ROM and abduction parameters to what was specified by the doctor settings for an posterior hip dislocation. I then fit the patient with the aid of her nurse Nancy RODRIGUEZ in order to maintain hip abduction as the patient was log-rolled onto her side. Patient's orthosis was then properly tightened, heights of pelvic and this sections were set, all straps were tightened and trimmed and all screws and  fasteners were properly tightened with [place]. After fitting was complete, orthosis fit was deemed optimal. I explain care and use instructions to the patient and her nurse. Patient reported she understood instructions and did not have further questions. Patient was able to sit up in the chair with the helmet of her nurse and myself with a walker. Patient moves very slow but stated that the brace felt fine. The brace will ride up if not placed on her right or if she tries to sit to far forward.      P: I gave the patient the use and care information and a contact number for the clinic to call with any questions or concerns. I asked the patient to let the nursing staff know if anything is needed with her hip brace while in the hospital.      LEONEL Cuba.

## 2021-06-04 NOTE — ANESTHESIA POSTPROCEDURE EVALUATION
Patient: Mitzi De Santiago  CLOSED REDUCTION, HIP  Anesthesia type: general    Patient location: PACU  Last vitals:   Vitals Value Taken Time   /63 1/4/2020  9:45 PM   Temp 37.2  C (98.9  F) 1/4/2020  9:31 PM   Pulse 75 1/4/2020  9:45 PM   Resp 29 1/4/2020  9:45 PM   SpO2 100 % 1/4/2020  9:45 PM   Vitals shown include unvalidated device data.  Post vital signs: stable  Level of consciousness: awake, alert, oriented and responds to simple questions  Post-anesthesia pain: pain controlled  Post-anesthesia nausea and vomiting: no  Pulmonary: unassisted, return to baseline  Cardiovascular: stable and blood pressure at baseline  Hydration: adequate  Anesthetic events: no    QCDR Measures:  ASA# 11 - Janine-op Cardiac Arrest: ASA11B - Patient did NOT experience unanticipated cardiac arrest  ASA# 12 - Janine-op Mortality Rate: ASA12B - Patient did NOT die  ASA# 13 - PACU Re-Intubation Rate: ASA13B - Patient did NOT require a new airway mgmt  ASA# 10 - Composite Anes Safety: ASA10A - No serious adverse event    Additional Notes:  Patient was resting in bed reporting no pain.

## 2021-06-04 NOTE — ANESTHESIA CARE TRANSFER NOTE
Last vitals:   Vitals:    01/04/20 1957   BP: 157/61   Pulse: 78   Resp: 19   Temp: 36.6  C (97.9  F)   SpO2: 99%     Patient's level of consciousness is drowsy  Spontaneous respirations: yes  Maintains airway independently: yes  Dentition unchanged: yes  Oropharynx: oropharynx clear of all foreign objects    QCDR Measures:  ASA# 20 - Surgical Safety Checklist: WHO surgical safety checklist completed prior to induction    PQRS# 430 - Adult PONV Prevention: 4558F - Pt received => 2 anti-emetic agents (different classes) preop & intraop  ASA# 8 - Peds PONV Prevention: NA - Not pediatric patient, not GA or 2 or more risk factors NOT present  PQRS# 424 - Janine-op Temp Management: NA - MAC anesthesia or case < 60 minutes  PQRS# 426 - PACU Transfer Protocol: - Transfer of care checklist used  ASA# 14 - Acute Post-op Pain: ASA14B - Patient did NOT experience pain >= 7 out of 10

## 2021-06-05 NOTE — PROGRESS NOTES
[facility]    Facility:   [facility] [[id number]]   Code Status: FULL CODE      CHIEF COMPLAINT/REASON FOR VISIT:  Chief Complaint   Patient presents with     Review Of Multiple Medical Conditions       HISTORY:      HPI: Mitzi is a 85 y.o. female undergoing physical and occupational therapy at [facility] transitional care unit.  She is with past medical history of hypertension, hematoma, chronic kidney disease stage II-III and osteoarthritis. TodayDebility PT OT therapy to evaluate for lymph wraps who presented to the emergency department for evaluation of right hip pain after mechanical fall.  She underwent a reduction under anesthesia.  She is with a history of a total right hip arthroplasty the femoral prosthesis is dislocated superiorly and no fracture.  She also had a hypertension crisis during her hospitalization per hospital report probably secondary to pain she did receive IV hydralazine.    Today she is seen as a first routine visit to review multiple medical issues.  She denies chest pain or shortness of breath.  She reports positive bowel movement and no issues with urination.  She is alert and oriented x4.  She does report that her pain is controlled with current medications but does get worse when she starts to move.  There was no incisions to be found it appears her dislocation was reduced in the OR to anesthesia.  She does have a coccyx wound and dressing change orders were changed today after being evaluated by the wound nurse.  She does have a hinged hip brace for when she is out of bed.    Past Medical History:   Diagnosis Date     Basal cell carcinoma 9/2000    forehead     Bunion      Cellulitis      Fatty liver      Hammer toe      Hypertension      Open wound(s) (multiple) of unspecified site(s), without mention of complication      Papilloma of breast              Family History   Problem Relation Age of Onset     No Medical  Problems Mother      Stroke Father      No Medical Problems Sister      No Medical Problems Daughter      No Medical Problems Maternal Grandmother      No Medical Problems Maternal Grandfather      No Medical Problems Paternal Grandmother      No Medical Problems Paternal Grandfather      No Medical Problems Maternal Aunt      No Medical Problems Paternal Aunt      BRCA 1/2 Neg Hx      Breast cancer Neg Hx      Cancer Neg Hx      Colon cancer Neg Hx      Endometrial cancer Neg Hx      Ovarian cancer Neg Hx      Social History     Socioeconomic History     Marital status:      Spouse name: Not on file     Number of children: Not on file     Years of education: Not on file     Highest education level: Not on file   Occupational History     Not on file   Social Needs     Financial resource strain: Not on file     Food insecurity:     Worry: Not on file     Inability: Not on file     Transportation needs:     Medical: Not on file     Non-medical: Not on file   Tobacco Use     Smoking status: Never Smoker     Smokeless tobacco: Never Used   Substance and Sexual Activity     Alcohol use: No     Drug use: Never     Sexual activity: Not on file   Lifestyle     Physical activity:     Days per week: Not on file     Minutes per session: Not on file     Stress: Not on file   Relationships     Social connections:     Talks on phone: Not on file     Gets together: Not on file     Attends Shinto service: Not on file     Active member of club or organization: Not on file     Attends meetings of clubs or organizations: Not on file     Relationship status: Not on file     Intimate partner violence:     Fear of current or ex partner: Not on file     Emotionally abused: Not on file     Physically abused: Not on file     Forced sexual activity: Not on file   Other Topics Concern     Not on file   Social History Narrative    Living at home alone.  and has 1 daughter in [place].          Review of Systems   Constitutional:  Positive for activity change and fatigue. Negative for appetite change and fever.   HENT: Negative for congestion.    Respiratory: Negative for cough, shortness of breath and wheezing.    Cardiovascular: Negative for chest pain and leg swelling.   Gastrointestinal: Negative for abdominal distention, abdominal pain, constipation, diarrhea and nausea.   Genitourinary: Negative for dysuria.   Musculoskeletal: Positive for arthralgias. Negative for back pain.   Skin: Positive for wound. Negative for color change.   Neurological: Negative for dizziness.   Psychiatric/Behavioral: Negative for agitation, behavioral problems and confusion.       Vitals:    01/08/20 1226   BP: 150/62   Pulse: 69   Resp: 18   Temp: 98.5  F (36.9  C)   SpO2: 96%       Physical Exam  Constitutional:       Appearance: She is well-developed.      Comments: Pleasant woman in no acute distress   HENT:      Head: Normocephalic.   Eyes:      Conjunctiva/sclera: Conjunctivae normal.   Neck:      Musculoskeletal: Normal range of motion.   Cardiovascular:      Rate and Rhythm: Normal rate and regular rhythm.      Heart sounds: Normal heart sounds. No murmur.   Pulmonary:      Effort: No respiratory distress.      Breath sounds: Normal breath sounds. No wheezing or rales.   Abdominal:      General: Bowel sounds are normal. There is no distension.      Palpations: Abdomen is soft.      Tenderness: There is no abdominal tenderness.   Musculoskeletal: Normal range of motion.      Comments: A hinged hip brace  Decreased range of motion left upper extremity she reports it has been this way for years.   Skin:     General: Skin is warm.      Comments: Coccyx wound being followed by the wound care team  Rash in her abdominal folds nystatin powder ordered   Neurological:      Mental Status: She is alert and oriented to person, place, and time.   Psychiatric:         Behavior: Behavior normal.           LABS:   Recent Results (from the past 240 hour(s))   Basic  Metabolic Panel   Result Value Ref Range    Sodium 140 136 - 145 mmol/L    Potassium 3.9 3.5 - 5.0 mmol/L    Chloride 103 98 - 107 mmol/L    CO2 21 (L) 22 - 31 mmol/L    Anion Gap, Calculation 16 5 - 18 mmol/L    Glucose 154 (H) 70 - 125 mg/dL    Calcium 9.6 8.5 - 10.5 mg/dL    BUN 17 8 - 28 mg/dL    Creatinine 1.34 (H) 0.60 - 1.10 mg/dL    GFR MDRD Af Amer 46 (L) >60 mL/min/1.73m2    GFR MDRD Non Af Amer 38 (L) >60 mL/min/1.73m2   Hepatic Profile   Result Value Ref Range    Bilirubin, Total 1.0 0.0 - 1.0 mg/dL    Bilirubin, Direct 0.4 <=0.5 mg/dL    Protein, Total 7.4 6.0 - 8.0 g/dL    Albumin 3.2 (L) 3.5 - 5.0 g/dL    Alkaline Phosphatase 109 45 - 120 U/L    AST 60 (H) 0 - 40 U/L    ALT 22 0 - 45 U/L   HM2 (CBC W/O DIFF)   Result Value Ref Range    WBC 9.7 4.0 - 11.0 thou/uL    RBC 3.09 (L) 3.80 - 5.40 mill/uL    Hemoglobin 10.8 (L) 12.0 - 16.0 g/dL    Hematocrit 32.1 (L) 35.0 - 47.0 %     (H) 80 - 100 fL    MCH 35.0 (H) 27.0 - 34.0 pg    MCHC 33.6 32.0 - 36.0 g/dL    RDW 14.3 11.0 - 14.5 %    Platelets 140 140 - 440 thou/uL    MPV 9.4 8.5 - 12.5 fL   Magnesium   Result Value Ref Range    Magnesium 1.7 (L) 1.8 - 2.6 mg/dL   CK   Result Value Ref Range    CK, Total 2,018 (HH) 30 - 190 U/L   C-reactive protein   Result Value Ref Range    CRP 3.9 (H) 0.0 - 0.8 mg/dL   Thyroid Cascade   Result Value Ref Range    TSH 1.52 0.30 - 5.00 uIU/mL   Urinalysis-UC if Indicated   Result Value Ref Range    Color, UA Yellow Colorless, Yellow, Straw, Light Yellow    Clarity, UA Clear Clear    Glucose, UA Negative Negative    Bilirubin, UA Negative Negative    Ketones, UA Negative Negative, 60 mg/dL    Specific Gravity, UA 1.013 1.001 - 1.030    Blood, UA Large (!) Negative    pH, UA 6.0 4.5 - 8.0    Protein, UA 30 mg/dL (!) Negative mg/dL    Urobilinogen, UA <2.0 E.U./dL <2.0 E.U./dL, 2.0 E.U./dL    Nitrite, UA Negative Negative    Leukocytes, UA Negative Negative    Bacteria, UA None Seen None Seen hpf    RBC, UA 0-2 None  Seen, 0-2 hpf    WBC, UA 0-5 None Seen, 0-5 hpf    Squam Epithel, UA 25-50 (!) None Seen, 0-5 lpf    Hyaline Casts, UA 0-5 0-5, None Seen lpf   Pregnancy, urine   Result Value Ref Range    Pregnancy Test, Urine Negative Negative   Comprehensive metabolic panel   Result Value Ref Range    Sodium 140 136 - 145 mmol/L    Potassium 3.9 3.5 - 5.0 mmol/L    Chloride 109 (H) 98 - 107 mmol/L    CO2 23 22 - 31 mmol/L    Anion Gap, Calculation 8 5 - 18 mmol/L    Glucose 107 70 - 125 mg/dL    BUN 22 8 - 28 mg/dL    Creatinine 1.16 (H) 0.60 - 1.10 mg/dL    GFR MDRD Af Amer 54 (L) >60 mL/min/1.73m2    GFR MDRD Non Af Amer 44 (L) >60 mL/min/1.73m2    Bilirubin, Total 1.0 0.0 - 1.0 mg/dL    Calcium 8.0 (L) 8.5 - 10.5 mg/dL    Protein, Total 5.3 (L) 6.0 - 8.0 g/dL    Albumin 2.3 (L) 3.5 - 5.0 g/dL    Alkaline Phosphatase 69 45 - 120 U/L    AST 87 (H) 0 - 40 U/L    ALT 31 0 - 45 U/L   CK Total   Result Value Ref Range    CK, Total 2,927 (HH) 30 - 190 U/L   HM1 (CBC with Diff)   Result Value Ref Range    WBC 4.8 4.0 - 11.0 thou/uL    RBC 2.10 (L) 3.80 - 5.40 mill/uL    Hemoglobin 7.5 (L) 12.0 - 16.0 g/dL    Hematocrit 22.5 (L) 35.0 - 47.0 %     (H) 80 - 100 fL    MCH 35.7 (H) 27.0 - 34.0 pg    MCHC 33.3 32.0 - 36.0 g/dL    RDW 14.5 11.0 - 14.5 %    Platelets 100 (L) 140 - 440 thou/uL    MPV 9.9 8.5 - 12.5 fL    Neutrophils % 77 (H) 50 - 70 %    Lymphocytes % 15 (L) 20 - 40 %    Monocytes % 7 2 - 10 %    Eosinophils % 0 0 - 6 %    Basophils % 0 0 - 2 %    Neutrophils Absolute 3.7 2.0 - 7.7 thou/uL    Lymphocytes Absolute 0.7 (L) 0.8 - 4.4 thou/uL    Monocytes Absolute 0.3 0.0 - 0.9 thou/uL    Eosinophils Absolute 0.0 0.0 - 0.4 thou/uL    Basophils Absolute 0.0 0.0 - 0.2 thou/uL   Basic Metabolic Panel   Result Value Ref Range    Sodium 139 136 - 145 mmol/L    Potassium 3.5 3.5 - 5.0 mmol/L    Chloride 110 (H) 98 - 107 mmol/L    CO2 24 22 - 31 mmol/L    Anion Gap, Calculation 5 5 - 18 mmol/L    Glucose 90 70 - 125 mg/dL     Calcium 7.6 (L) 8.5 - 10.5 mg/dL    BUN 21 8 - 28 mg/dL    Creatinine 0.95 0.60 - 1.10 mg/dL    GFR MDRD Af Amer >60 >60 mL/min/1.73m2    GFR MDRD Non Af Amer 56 (L) >60 mL/min/1.73m2   CK Total   Result Value Ref Range    CK, Total 2,295 (HH) 30 - 190 U/L   HM2(CBC w/o Differential)   Result Value Ref Range    WBC 4.7 4.0 - 11.0 thou/uL    RBC 2.02 (L) 3.80 - 5.40 mill/uL    Hemoglobin 7.0 (L) 12.0 - 16.0 g/dL    Hematocrit 21.4 (L) 35.0 - 47.0 %     (H) 80 - 100 fL    MCH 34.7 (H) 27.0 - 34.0 pg    MCHC 32.7 32.0 - 36.0 g/dL    RDW 14.8 (H) 11.0 - 14.5 %    Platelets 104 (L) 140 - 440 thou/uL    MPV 9.8 8.5 - 12.5 fL   Type and Screen   Result Value Ref Range    ABORh O POS     Antibody Screen Negative Negative   Magnesium   Result Value Ref Range    Magnesium 1.9 1.8 - 2.6 mg/dL   Hemoglobin   Result Value Ref Range    Hemoglobin 9.6 (L) 12.0 - 16.0 g/dL   Crossmatch   Result Value Ref Range    Crossmatch Compatible     Blood Expiration Date [id number]     Unit Type O Pos     Unit Number [id number]     Status Transfused     Component Red Blood Cells     PRODUCT CODE I3025R11     Issue Date and Time [id number]     Blood Type 5100     CODING SYSTEM [id number]    Crossmatch   Result Value Ref Range    Crossmatch Compatible     Blood Expiration Date 81834361101610     Unit Type O Pos     Unit Number [id number]     Status Transfused     Component Red Blood Cells     PRODUCT CODE H7245Q06     Issue Date and Time [id number]     Blood Type 5100     CODING SYSTEM [id number]        ASSESSMENT:      ICD-10-CM    1. Accelerated hypertension I10    2. Open wound(s) (multiple) of unspecified site(s), without mention of complication T14.8XXA    3. Pain management R52        PLAN:    Hypertension continue atenolol and hydrochlorothiazide blood pressure stable    Coccyx wound previous treatments have been discontinued and patient will have a wet to dry dressing change daily.  There was no necrosis noted patient  being followed by the wound care team    Pain management patient reports pain controlled on scheduled Tylenol    Anticoagulation currently on 81 mg twice daily    Reduction of a right dislocated hip patient underwent reduction in the OR under anesthesia failed attempt in the ER, patient with a hinged hip brace    Debility PT OT    Rhabdomyolysis-elevated CK on 1/6/2020 monitor CK and BMP levels    Electronically signed by: Nazanin Gonsalves CNP

## 2021-06-05 NOTE — PROGRESS NOTES
[facility]    Facility:   [facility] [[id number]]   Code Status: FULL CODE      CHIEF COMPLAINT/REASON FOR VISIT:  Chief Complaint   Patient presents with     Review Of Multiple Medical Conditions       HISTORY:      HPI: Mitzi is a 85 y.o. female undergoing physical and occupational therapy at [facility] transitional care unit.  She is with past medical history of hypertension, hematoma, chronic kidney disease stage II-III and osteoarthritis. Today[facility] PT OT therapy to evaluate for lymph wraps who presented to the emergency department for evaluation of right hip pain after mechanical fall.  She underwent a reduction under anesthesia.  She is with a history of a total right hip arthroplasty the femoral prosthesis is dislocated superiorly and no fracture.  She also had a hypertension crisis during her hospitalization per hospital report probably secondary to pain she did receive IV hydralazine.    Today she is seen as a routine visit to review multiple medical issues.  She denies chest pain or shortness of breath.  She reports no BM x 2 days. She denied feeling constipated and is passing flatus.  no issues with urination.  She is alert and oriented x4.   Her pain is controlled.  She does have a coccyx wound and being followed by the  wound nurse.  She does have a hinged hip brace for when she is out of bed.  She was noted to have +3 left lower extremity and +2 in the right lower extremity Compression was ordered.  Her weight is up  5 pounds since admission, awaiting a new weight.     BMP today was within normal limits.  Her CK is  48.  Today I am told by the therapist that he she is making progress, she was able to walk the bars on Friday.  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    Past Medical History:   Diagnosis Date     Basal cell carcinoma 9/2000    forehead     Bunion      Cellulitis      Fatty liver      Hammer toe      Hypertension       Open wound(s) (multiple) of unspecified site(s), without mention of complication      Papilloma of breast              Family History   Problem Relation Age of Onset     No Medical Problems Mother      Stroke Father      No Medical Problems Sister      No Medical Problems Daughter      No Medical Problems Maternal Grandmother      No Medical Problems Maternal Grandfather      No Medical Problems Paternal Grandmother      No Medical Problems Paternal Grandfather      No Medical Problems Maternal Aunt      No Medical Problems Paternal Aunt      BRCA 1/2 Neg Hx      Breast cancer Neg Hx      Cancer Neg Hx      Colon cancer Neg Hx      Endometrial cancer Neg Hx      Ovarian cancer Neg Hx      Social History     Socioeconomic History     Marital status:      Spouse name: Not on file     Number of children: Not on file     Years of education: Not on file     Highest education level: Not on file   Occupational History     Not on file   Social Needs     Financial resource strain: Not on file     Food insecurity:     Worry: Not on file     Inability: Not on file     Transportation needs:     Medical: Not on file     Non-medical: Not on file   Tobacco Use     Smoking status: Never Smoker     Smokeless tobacco: Never Used   Substance and Sexual Activity     Alcohol use: No     Drug use: Never     Sexual activity: Not on file   Lifestyle     Physical activity:     Days per week: Not on file     Minutes per session: Not on file     Stress: Not on file   Relationships     Social connections:     Talks on phone: Not on file     Gets together: Not on file     Attends Anabaptist service: Not on file     Active member of club or organization: Not on file     Attends meetings of clubs or organizations: Not on file     Relationship status: Not on file     Intimate partner violence:     Fear of current or ex partner: Not on file     Emotionally abused: Not on file     Physically abused: Not on file     Forced sexual activity: Not  on file   Other Topics Concern     Not on file   Social History Narrative    Living at home alone.  and has 1 daughter in [place].          Review of Systems   Constitutional: Positive for activity change and fatigue. Negative for appetite change and fever.   HENT: Negative for congestion.    Respiratory: Negative for cough, shortness of breath and wheezing.    Cardiovascular: Negative for chest pain and leg swelling.   Gastrointestinal: Negative for abdominal distention, abdominal pain, constipation, diarrhea and nausea.   Genitourinary: Negative for dysuria.   Musculoskeletal: Positive for arthralgias. Negative for back pain.   Skin: Positive for wound. Negative for color change.   Neurological: Negative for dizziness.   Psychiatric/Behavioral: Negative for agitation, behavioral problems and confusion.       Vitals:    01/13/20 0821   BP: 142/60   Pulse: (!) 59   Resp: 16   Temp: 97.7  F (36.5  C)   SpO2: 95%   Weight: 171 lb (77.6 kg)       Physical Exam  Constitutional:       Appearance: She is well-developed.      Comments: Pleasant woman in no acute distress   HENT:      Head: Normocephalic.   Eyes:      Conjunctiva/sclera: Conjunctivae normal.   Neck:      Musculoskeletal: Normal range of motion.   Cardiovascular:      Rate and Rhythm: Normal rate and regular rhythm.      Heart sounds: Normal heart sounds. No murmur.   Pulmonary:      Effort: No respiratory distress.      Breath sounds: Normal breath sounds. No wheezing or rales.   Abdominal:      General: Bowel sounds are normal. There is no distension.      Palpations: Abdomen is soft.      Tenderness: There is no abdominal tenderness.   Musculoskeletal: Normal range of motion.      Comments: A hinged hip brace  Decreased range of motion left upper extremity she reports it has been this way for years.   Skin:     General: Skin is warm.      Comments: Coccyx wound being followed by the wound care team  Rash in her abdominal folds nystatin powder ordered    Neurological:      Mental Status: She is alert and oriented to person, place, and time.   Psychiatric:         Behavior: Behavior normal.           LABS:   Recent Results (from the past 240 hour(s))   Basic Metabolic Panel   Result Value Ref Range    Sodium 140 136 - 145 mmol/L    Potassium 3.9 3.5 - 5.0 mmol/L    Chloride 103 98 - 107 mmol/L    CO2 21 (L) 22 - 31 mmol/L    Anion Gap, Calculation 16 5 - 18 mmol/L    Glucose 154 (H) 70 - 125 mg/dL    Calcium 9.6 8.5 - 10.5 mg/dL    BUN 17 8 - 28 mg/dL    Creatinine 1.34 (H) 0.60 - 1.10 mg/dL    GFR MDRD Af Amer 46 (L) >60 mL/min/1.73m2    GFR MDRD Non Af Amer 38 (L) >60 mL/min/1.73m2   Hepatic Profile   Result Value Ref Range    Bilirubin, Total 1.0 0.0 - 1.0 mg/dL    Bilirubin, Direct 0.4 <=0.5 mg/dL    Protein, Total 7.4 6.0 - 8.0 g/dL    Albumin 3.2 (L) 3.5 - 5.0 g/dL    Alkaline Phosphatase 109 45 - 120 U/L    AST 60 (H) 0 - 40 U/L    ALT 22 0 - 45 U/L   HM2 (CBC W/O DIFF)   Result Value Ref Range    WBC 9.7 4.0 - 11.0 thou/uL    RBC 3.09 (L) 3.80 - 5.40 mill/uL    Hemoglobin 10.8 (L) 12.0 - 16.0 g/dL    Hematocrit 32.1 (L) 35.0 - 47.0 %     (H) 80 - 100 fL    MCH 35.0 (H) 27.0 - 34.0 pg    MCHC 33.6 32.0 - 36.0 g/dL    RDW 14.3 11.0 - 14.5 %    Platelets 140 140 - 440 thou/uL    MPV 9.4 8.5 - 12.5 fL   Magnesium   Result Value Ref Range    Magnesium 1.7 (L) 1.8 - 2.6 mg/dL   CK   Result Value Ref Range    CK, Total 2,018 (HH) 30 - 190 U/L   C-reactive protein   Result Value Ref Range    CRP 3.9 (H) 0.0 - 0.8 mg/dL   Thyroid Cascade   Result Value Ref Range    TSH 1.52 0.30 - 5.00 uIU/mL   Urinalysis-UC if Indicated   Result Value Ref Range    Color, UA Yellow Colorless, Yellow, Straw, Light Yellow    Clarity, UA Clear Clear    Glucose, UA Negative Negative    Bilirubin, UA Negative Negative    Ketones, UA Negative Negative, 60 mg/dL    Specific Gravity, UA 1.013 1.001 - 1.030    Blood, UA Large (!) Negative    pH, UA 6.0 4.5 - 8.0    Protein, UA 30  mg/dL (!) Negative mg/dL    Urobilinogen, UA <2.0 E.U./dL <2.0 E.U./dL, 2.0 E.U./dL    Nitrite, UA Negative Negative    Leukocytes, UA Negative Negative    Bacteria, UA None Seen None Seen hpf    RBC, UA 0-2 None Seen, 0-2 hpf    WBC, UA 0-5 None Seen, 0-5 hpf    Squam Epithel, UA 25-50 (!) None Seen, 0-5 lpf    Hyaline Casts, UA 0-5 0-5, None Seen lpf   Pregnancy, urine   Result Value Ref Range    Pregnancy Test, Urine Negative Negative   Comprehensive metabolic panel   Result Value Ref Range    Sodium 140 136 - 145 mmol/L    Potassium 3.9 3.5 - 5.0 mmol/L    Chloride 109 (H) 98 - 107 mmol/L    CO2 23 22 - 31 mmol/L    Anion Gap, Calculation 8 5 - 18 mmol/L    Glucose 107 70 - 125 mg/dL    BUN 22 8 - 28 mg/dL    Creatinine 1.16 (H) 0.60 - 1.10 mg/dL    GFR MDRD Af Amer 54 (L) >60 mL/min/1.73m2    GFR MDRD Non Af Amer 44 (L) >60 mL/min/1.73m2    Bilirubin, Total 1.0 0.0 - 1.0 mg/dL    Calcium 8.0 (L) 8.5 - 10.5 mg/dL    Protein, Total 5.3 (L) 6.0 - 8.0 g/dL    Albumin 2.3 (L) 3.5 - 5.0 g/dL    Alkaline Phosphatase 69 45 - 120 U/L    AST 87 (H) 0 - 40 U/L    ALT 31 0 - 45 U/L   CK Total   Result Value Ref Range    CK, Total 2,927 (HH) 30 - 190 U/L   HM1 (CBC with Diff)   Result Value Ref Range    WBC 4.8 4.0 - 11.0 thou/uL    RBC 2.10 (L) 3.80 - 5.40 mill/uL    Hemoglobin 7.5 (L) 12.0 - 16.0 g/dL    Hematocrit 22.5 (L) 35.0 - 47.0 %     (H) 80 - 100 fL    MCH 35.7 (H) 27.0 - 34.0 pg    MCHC 33.3 32.0 - 36.0 g/dL    RDW 14.5 11.0 - 14.5 %    Platelets 100 (L) 140 - 440 thou/uL    MPV 9.9 8.5 - 12.5 fL    Neutrophils % 77 (H) 50 - 70 %    Lymphocytes % 15 (L) 20 - 40 %    Monocytes % 7 2 - 10 %    Eosinophils % 0 0 - 6 %    Basophils % 0 0 - 2 %    Neutrophils Absolute 3.7 2.0 - 7.7 thou/uL    Lymphocytes Absolute 0.7 (L) 0.8 - 4.4 thou/uL    Monocytes Absolute 0.3 0.0 - 0.9 thou/uL    Eosinophils Absolute 0.0 0.0 - 0.4 thou/uL    Basophils Absolute 0.0 0.0 - 0.2 thou/uL   Basic Metabolic Panel   Result Value  Ref Range    Sodium 139 136 - 145 mmol/L    Potassium 3.5 3.5 - 5.0 mmol/L    Chloride 110 (H) 98 - 107 mmol/L    CO2 24 22 - 31 mmol/L    Anion Gap, Calculation 5 5 - 18 mmol/L    Glucose 90 70 - 125 mg/dL    Calcium 7.6 (L) 8.5 - 10.5 mg/dL    BUN 21 8 - 28 mg/dL    Creatinine 0.95 0.60 - 1.10 mg/dL    GFR MDRD Af Amer >60 >60 mL/min/1.73m2    GFR MDRD Non Af Amer 56 (L) >60 mL/min/1.73m2   CK Total   Result Value Ref Range    CK, Total 2,295 (HH) 30 - 190 U/L   HM2(CBC w/o Differential)   Result Value Ref Range    WBC 4.7 4.0 - 11.0 thou/uL    RBC 2.02 (L) 3.80 - 5.40 mill/uL    Hemoglobin 7.0 (L) 12.0 - 16.0 g/dL    Hematocrit 21.4 (L) 35.0 - 47.0 %     (H) 80 - 100 fL    MCH 34.7 (H) 27.0 - 34.0 pg    MCHC 32.7 32.0 - 36.0 g/dL    RDW 14.8 (H) 11.0 - 14.5 %    Platelets 104 (L) 140 - 440 thou/uL    MPV 9.8 8.5 - 12.5 fL   Type and Screen   Result Value Ref Range    ABORh O POS     Antibody Screen Negative Negative   Magnesium   Result Value Ref Range    Magnesium 1.9 1.8 - 2.6 mg/dL   Hemoglobin   Result Value Ref Range    Hemoglobin 9.6 (L) 12.0 - 16.0 g/dL   Crossmatch   Result Value Ref Range    Crossmatch Compatible     Blood Expiration Date 20200130235900     Unit Type O Pos     Unit Number [id number]     Status Transfused     Component Red Blood Cells     PRODUCT CODE M0299X52     Issue Date and Time 02904004567334     Blood Type 5100     CODING SYSTEM [id number]    Crossmatch   Result Value Ref Range    Crossmatch Compatible     Blood Expiration Date 60920688773396     Unit Type O Pos     Unit Number [id number]     Status Transfused     Component Red Blood Cells     PRODUCT CODE V0586K37     Issue Date and Time 10104283689940     Blood Type 5100     CODING SYSTEM [id number]        ASSESSMENT:      ICD-10-CM    1. Accelerated hypertension I10    2. Open wound(s) (multiple) of unspecified site(s), without mention of complication T14.8XXA    3. Pain management R52    4. Anticoagulated Z79.01         PLAN:    Hypertension continue atenolol and hydrochlorothiazide blood pressure stable    Coccyx wound continue dressing changes she is being followed by the wound care team    Pain management patient reports pain controlled on scheduled Tylenol    Anticoagulation currently on 81 mg twice daily    Reduction of a right dislocated hip patient underwent reduction in the OR under anesthesia failed attempt in the ER, patient with a hinged hip brace    [facility] PT OT    Rhabdomyolysis-elevated CK on 1/6/2020 and now within normal limits at 48    Electronically signed by: Nazanin Gonsalves CNP

## 2021-06-05 NOTE — PROGRESS NOTES
[facility]    Facility:   [facility] [[id number]]   Code Status: FULL CODE      CHIEF COMPLAINT/REASON FOR VISIT:  Chief Complaint   Patient presents with     Review Of Multiple Medical Conditions       HISTORY:      HPI: Mitzi is a 85 y.o. female undergoing physical and occupational therapy at [facility] transitional care unit.  She is with past medical history of hypertension, hematoma, chronic kidney disease stage II-III and osteoarthritis. Today[facility] PT OT therapy to evaluate for lymph wraps who presented to the emergency department for evaluation of right hip pain after mechanical fall.  She underwent a reduction under anesthesia.  She is with a history of a total right hip arthroplasty the femoral prosthesis is dislocated superiorly and no fracture.  She also had a hypertension crisis during her hospitalization per hospital report probably secondary to pain she did receive IV hydralazine.    Today she is seen as a routine visit to review multiple medical issues.  She denies chest pain or shortness of breath.  She is moving her bowels and .  no issues with urination. [employer] forms also filled out with patient at the bedside,  She is alert and oriented x4.   Her pain is controlled.  She does have a coccyx wound and being followed by the  wound nurse. Per stqff the wound is 100 granulated measuring .5 X.5 with 2.5 depth.   She does have a hinged hip brace for when she is out of bed.  She is with lower extremity edema and wearing Compression   Her CK is WNL at  48.  She is making progress, she was able to walk the bars.  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     Past Medical History:   Diagnosis Date     Basal cell carcinoma 9/2000    forehead     Bunion      Cellulitis      Fatty liver      Hammer toe      Hypertension      Open wound(s) (multiple) of unspecified site(s), without mention of complication      Papilloma of breast               Family History   Problem Relation Age of Onset     No Medical Problems Mother      Stroke Father      No Medical Problems Sister      No Medical Problems Daughter      No Medical Problems Maternal Grandmother      No Medical Problems Maternal Grandfather      No Medical Problems Paternal Grandmother      No Medical Problems Paternal Grandfather      No Medical Problems Maternal Aunt      No Medical Problems Paternal Aunt      BRCA 1/2 Neg Hx      Breast cancer Neg Hx      Cancer Neg Hx      Colon cancer Neg Hx      Endometrial cancer Neg Hx      Ovarian cancer Neg Hx      Social History     Socioeconomic History     Marital status:      Spouse name: Not on file     Number of children: Not on file     Years of education: Not on file     Highest education level: Not on file   Occupational History     Not on file   Social Needs     Financial resource strain: Not on file     Food insecurity:     Worry: Not on file     Inability: Not on file     Transportation needs:     Medical: Not on file     Non-medical: Not on file   Tobacco Use     Smoking status: Never Smoker     Smokeless tobacco: Never Used   Substance and Sexual Activity     Alcohol use: No     Drug use: Never     Sexual activity: Not on file   Lifestyle     Physical activity:     Days per week: Not on file     Minutes per session: Not on file     Stress: Not on file   Relationships     Social connections:     Talks on phone: Not on file     Gets together: Not on file     Attends Orthodox service: Not on file     Active member of club or organization: Not on file     Attends meetings of clubs or organizations: Not on file     Relationship status: Not on file     Intimate partner violence:     Fear of current or ex partner: Not on file     Emotionally abused: Not on file     Physically abused: Not on file     Forced sexual activity: Not on file   Other Topics Concern     Not on file   Social History Narrative    Living at home alone.   and has 1 daughter in [place].          Review of Systems   Constitutional: Positive for activity change and fatigue. Negative for appetite change and fever.   HENT: Negative for congestion.    Respiratory: Negative for cough, shortness of breath and wheezing.    Cardiovascular: Negative for chest pain and leg swelling.   Gastrointestinal: Negative for abdominal distention, abdominal pain, constipation, diarrhea and nausea.   Genitourinary: Negative for dysuria.   Musculoskeletal: Positive for arthralgias. Negative for back pain.   Skin: Positive for wound. Negative for color change.   Neurological: Negative for dizziness.   Psychiatric/Behavioral: Negative for agitation, behavioral problems and confusion.       Vitals:    01/17/20 0916   BP: 110/60   Pulse: (!) 57   Resp: 18   Temp: 98.7  F (37.1  C)   SpO2: 95%   Weight: 161 lb 8 oz (73.3 kg)       Physical Exam  Constitutional:       Appearance: She is well-developed.      Comments: Pleasant woman in no acute distress   HENT:      Head: Normocephalic.   Eyes:      Conjunctiva/sclera: Conjunctivae normal.   Neck:      Musculoskeletal: Normal range of motion.   Cardiovascular:      Rate and Rhythm: Normal rate and regular rhythm.      Heart sounds: Normal heart sounds. No murmur.   Pulmonary:      Effort: No respiratory distress.      Breath sounds: Normal breath sounds. No wheezing or rales.   Abdominal:      General: Bowel sounds are normal. There is no distension.      Palpations: Abdomen is soft.      Tenderness: There is no abdominal tenderness.   Musculoskeletal: Normal range of motion.      Comments: A hinged hip brace  Decreased range of motion left upper extremity she reports it has been this way for years.   Skin:     General: Skin is warm.      Comments: Coccyx wound being followed by the wound care team  Rash in her abdominal folds nystatin powder ordered   Neurological:      Mental Status: She is alert and oriented to person, place, and time.    Psychiatric:         Behavior: Behavior normal.           LABS:   Recent Results (from the past 240 hour(s))   Crossmatch   Result Value Ref Range    Crossmatch Compatible     Blood Expiration Date 20200130235900     Unit Type O Pos     Unit Number [id number]     Status Transfused     Component Red Blood Cells     PRODUCT CODE J6050F49     Issue Date and Time [id number]     Blood Type 5100     CODING SYSTEM [id number]    Crossmatch   Result Value Ref Range    Crossmatch Compatible     Blood Expiration Date 98779388711501     Unit Type O Pos     Unit Number [id number]     Status Transfused     Component Red Blood Cells     PRODUCT CODE M1777H83     Issue Date and Time [id number]     Blood Type 5100     CODING SYSTEM [id number]    Basic Metabolic Panel   Result Value Ref Range    Sodium 137 136 - 145 mmol/L    Potassium 4.0 3.5 - 5.0 mmol/L    Chloride 106 98 - 107 mmol/L    CO2 23 22 - 31 mmol/L    Anion Gap, Calculation 8 5 - 18 mmol/L    Glucose 92 70 - 125 mg/dL    Calcium 8.8 8.5 - 10.5 mg/dL    BUN 23 8 - 28 mg/dL    Creatinine 0.80 0.60 - 1.10 mg/dL    GFR MDRD Af Amer >60 >60 mL/min/1.73m2    GFR MDRD Non Af Amer >60 >60 mL/min/1.73m2   CK Total   Result Value Ref Range    CK, Total 48 30 - 190 U/L       ASSESSMENT:      ICD-10-CM    1. Secondary hypertension I15.9    2. Pain management R52    3. Anticoagulated Z79.01    4. Dislocated hip, right, sequela S73.004S        PLAN:      Greater than 20 minutes spent with patient working on [employer] paperwork.     Hypertension continue atenolol and hydrochlorothiazide blood pressure stable    Coccyx wound continue dressing changes she is being followed by the wound care team    Pain management patient reports pain controlled on scheduled Tylenol    Anticoagulation currently on 81 mg twice daily    Reduction of a right dislocated hip patient underwent reduction in the OR under anesthesia failed attempt in the ER, patient with a hinged hip  brace    [facility] PT OT    Rhabdomyolysis-elevated CK on 1/6/2020 and now within normal limits at 48    Electronically signed by: Nazanin Gonsalves CNP

## 2021-06-05 NOTE — PROGRESS NOTES
[facility]    Facility:   [facility] [[id number]]   Code Status: FULL CODE      CHIEF COMPLAINT/REASON FOR VISIT:  Chief Complaint   Patient presents with     Review Of Multiple Medical Conditions       HISTORY:      HPI: Mitzi is a 85 y.o. female undergoing physical and occupational therapy at [facility] transitional care unit.  She is with past medical history of hypertension, hematoma, chronic kidney disease stage II-III and osteoarthritis. TodayDebility PT OT therapy to evaluate for lymph wraps who presented to the emergency department for evaluation of right hip pain after mechanical fall.  She underwent a reduction under anesthesia.  She is with a history of a total right hip arthroplasty the femoral prosthesis is dislocated superiorly and no fracture.  She also had a hypertension crisis during her hospitalization per hospital report probably secondary to pain she did receive IV hydralazine.    Today she is seen as a routine visit to review multiple medical issues.  She denies chest pain or shortness of breath.  She is moving her bowels and has  no issues with urination.  She is alert and oriented x4.   Her pain is controlled.  She does have a coccyx wound and being followed by the  wound nurse.   She did follow-up with orthopedics on 1/20/2020 and she is to continue with her abductor brace for 2 more weeks orthopedics had no concerns and she will follow-up as needed.   She does have lower extremity edema and wearing compression during the day.   Her CK is WNL at  48.  She is a pivot transfer and walking the bars in therapy. Her weight is up 3 pounds in the last week.     Past Medical History:   Diagnosis Date     Basal cell carcinoma 9/2000    forehead     Bunion      Cellulitis      Fatty liver      Hammer toe      Hypertension      Open wound(s) (multiple) of unspecified site(s), without mention of complication      Papilloma of breast               Family History   Problem Relation Age of Onset     No Medical Problems Mother      Stroke Father      No Medical Problems Sister      No Medical Problems Daughter      No Medical Problems Maternal Grandmother      No Medical Problems Maternal Grandfather      No Medical Problems Paternal Grandmother      No Medical Problems Paternal Grandfather      No Medical Problems Maternal Aunt      No Medical Problems Paternal Aunt      BRCA 1/2 Neg Hx      Breast cancer Neg Hx      Cancer Neg Hx      Colon cancer Neg Hx      Endometrial cancer Neg Hx      Ovarian cancer Neg Hx      Social History     Socioeconomic History     Marital status:      Spouse name: Not on file     Number of children: Not on file     Years of education: Not on file     Highest education level: Not on file   Occupational History     Not on file   Social Needs     Financial resource strain: Not on file     Food insecurity:     Worry: Not on file     Inability: Not on file     Transportation needs:     Medical: Not on file     Non-medical: Not on file   Tobacco Use     Smoking status: Never Smoker     Smokeless tobacco: Never Used   Substance and Sexual Activity     Alcohol use: No     Drug use: Never     Sexual activity: Not on file   Lifestyle     Physical activity:     Days per week: Not on file     Minutes per session: Not on file     Stress: Not on file   Relationships     Social connections:     Talks on phone: Not on file     Gets together: Not on file     Attends Quaker service: Not on file     Active member of club or organization: Not on file     Attends meetings of clubs or organizations: Not on file     Relationship status: Not on file     Intimate partner violence:     Fear of current or ex partner: Not on file     Emotionally abused: Not on file     Physically abused: Not on file     Forced sexual activity: Not on file   Other Topics Concern     Not on file   Social History Narrative    Living at home alone.  and has 1  daughter in [place].          Review of Systems   Constitutional: Positive for activity change and fatigue. Negative for appetite change and fever.   HENT: Negative for congestion.    Respiratory: Negative for cough, shortness of breath and wheezing.    Cardiovascular: Negative for chest pain and leg swelling.   Gastrointestinal: Negative for abdominal distention, abdominal pain, constipation, diarrhea and nausea.   Genitourinary: Negative for dysuria.   Musculoskeletal: Positive for arthralgias. Negative for back pain.   Skin: Positive for wound. Negative for color change.   Neurological: Negative for dizziness.   Psychiatric/Behavioral: Negative for agitation, behavioral problems and confusion.       Vitals:    01/29/20 0750   BP: 134/60   Pulse: (!) 54   Resp: 18   Temp: 98.6  F (37  C)   SpO2: 97%   Weight: 163 lb 6.4 oz (74.1 kg)       Physical Exam  Constitutional:       Appearance: She is well-developed.      Comments: Pleasant woman in no acute distress   HENT:      Head: Normocephalic.   Eyes:      Conjunctiva/sclera: Conjunctivae normal.   Neck:      Musculoskeletal: Normal range of motion.   Cardiovascular:      Rate and Rhythm: Normal rate and regular rhythm.      Heart sounds: Normal heart sounds. No murmur.   Pulmonary:      Effort: No respiratory distress.      Breath sounds: Normal breath sounds. No wheezing or rales.   Abdominal:      General: Bowel sounds are normal. There is no distension.      Palpations: Abdomen is soft.      Tenderness: There is no abdominal tenderness.   Musculoskeletal: Normal range of motion.      Comments: A hinged hip brace  Decreased range of motion left upper extremity she reports it has been this way for years.   Skin:     General: Skin is warm.      Comments: Coccyx wound being followed by the wound care team  Rash in her abdominal folds nystatin powder ordered   Neurological:      Mental Status: She is alert and oriented to person, place, and time.   Psychiatric:          Behavior: Behavior normal.           LABS:   No results found for this or any previous visit (from the past 240 hour(s)).    ASSESSMENT:      ICD-10-CM    1. Traumatic rhabdomyolysis, subsequent encounter T79.6XXD    2. Accelerated hypertension I10    3. Posterior dislocation of right hip, initial encounter (H) S73.014A    4. Age-related physical debility R54        PLAN:      Hypertension continue atenolol and hydrochlorothiazide blood pressure stable    Coccyx wound continue dressing changes she is being followed by the wound care team    Pain management patient reports pain controlled on scheduled Tylenol    Anticoagulation currently on 81 mg twice daily    Reduction of a right dislocated hip patient underwent reduction in the OR under anesthesia failed attempt in the ER, patient with a hinged hip brace    Debility PT OT    Rhabdomyolysis-elevated CK on 1/6/2020 and now within normal limits at 48    Electronically signed by: Nazanin Gonsalves CNP

## 2021-06-05 NOTE — PROGRESS NOTES
[facility]    Facility:   [facility] [[id number]]   Code Status: FULL CODE      CHIEF COMPLAINT/REASON FOR VISIT:  Chief Complaint   Patient presents with     Review Of Multiple Medical Conditions       HISTORY:      HPI: Mitzi is a 85 y.o. female undergoing physical and occupational therapy at [facility] transitional care unit.  She is with past medical history of hypertension, hematoma, chronic kidney disease stage II-III and osteoarthritis. TodayDebility PT OT therapy to evaluate for lymph wraps who presented to the emergency department for evaluation of right hip pain after mechanical fall.  She underwent a reduction under anesthesia.  She is with a history of a total right hip arthroplasty the femoral prosthesis is dislocated superiorly and no fracture.  She also had a hypertension crisis during her hospitalization per hospital report probably secondary to pain she did receive IV hydralazine.    Today she is seen as a routine visit to review multiple medical issues.  She denies chest pain or shortness of breath.  She is moving her bowels and has  no issues with urination.  She is alert and oriented x4.   Her pain is controlled.  She does have a coccyx wound and being followed by the  wound nurse.  Per staff her wound is 100% granulated.  She did follow-up with orthopedics on 1/20/2020 and she is to continue with her abductor brace for 2 more weeks orthopedics had no concerns and she will follow-up as needed.   She is with lower extremity edema and wearing Compression   Her CK is WNL at  48.  She is making progress, she was able to walk the bars.  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     Past Medical History:   Diagnosis Date     Basal cell carcinoma 9/2000    forehead     Bunion      Cellulitis      Fatty liver      Hammer toe      Hypertension      Open wound(s) (multiple) of unspecified site(s), without mention of complication       Papilloma of breast              Family History   Problem Relation Age of Onset     No Medical Problems Mother      Stroke Father      No Medical Problems Sister      No Medical Problems Daughter      No Medical Problems Maternal Grandmother      No Medical Problems Maternal Grandfather      No Medical Problems Paternal Grandmother      No Medical Problems Paternal Grandfather      No Medical Problems Maternal Aunt      No Medical Problems Paternal Aunt      BRCA 1/2 Neg Hx      Breast cancer Neg Hx      Cancer Neg Hx      Colon cancer Neg Hx      Endometrial cancer Neg Hx      Ovarian cancer Neg Hx      Social History     Socioeconomic History     Marital status:      Spouse name: Not on file     Number of children: Not on file     Years of education: Not on file     Highest education level: Not on file   Occupational History     Not on file   Social Needs     Financial resource strain: Not on file     Food insecurity:     Worry: Not on file     Inability: Not on file     Transportation needs:     Medical: Not on file     Non-medical: Not on file   Tobacco Use     Smoking status: Never Smoker     Smokeless tobacco: Never Used   Substance and Sexual Activity     Alcohol use: No     Drug use: Never     Sexual activity: Not on file   Lifestyle     Physical activity:     Days per week: Not on file     Minutes per session: Not on file     Stress: Not on file   Relationships     Social connections:     Talks on phone: Not on file     Gets together: Not on file     Attends Jehovah's witness service: Not on file     Active member of club or organization: Not on file     Attends meetings of clubs or organizations: Not on file     Relationship status: Not on file     Intimate partner violence:     Fear of current or ex partner: Not on file     Emotionally abused: Not on file     Physically abused: Not on file     Forced sexual activity: Not on file   Other Topics Concern     Not on file   Social History Narrative    Living at  home alone.  and has 1 daughter in [place].          Review of Systems   Constitutional: Positive for activity change and fatigue. Negative for appetite change and fever.   HENT: Negative for congestion.    Respiratory: Negative for cough, shortness of breath and wheezing.    Cardiovascular: Negative for chest pain and leg swelling.   Gastrointestinal: Negative for abdominal distention, abdominal pain, constipation, diarrhea and nausea.   Genitourinary: Negative for dysuria.   Musculoskeletal: Positive for arthralgias. Negative for back pain.   Skin: Positive for wound. Negative for color change.   Neurological: Negative for dizziness.   Psychiatric/Behavioral: Negative for agitation, behavioral problems and confusion.       Vitals:    01/21/20 0839   BP: 154/68   Pulse: (!) 56   Resp: 18   Temp: 98  F (36.7  C)   SpO2: 96%   Weight: 163 lb 6.4 oz (74.1 kg)       Physical Exam  Constitutional:       Appearance: She is well-developed.      Comments: Pleasant woman in no acute distress   HENT:      Head: Normocephalic.   Eyes:      Conjunctiva/sclera: Conjunctivae normal.   Neck:      Musculoskeletal: Normal range of motion.   Cardiovascular:      Rate and Rhythm: Normal rate and regular rhythm.      Heart sounds: Normal heart sounds. No murmur.   Pulmonary:      Effort: No respiratory distress.      Breath sounds: Normal breath sounds. No wheezing or rales.   Abdominal:      General: Bowel sounds are normal. There is no distension.      Palpations: Abdomen is soft.      Tenderness: There is no abdominal tenderness.   Musculoskeletal: Normal range of motion.      Comments: A hinged hip brace  Decreased range of motion left upper extremity she reports it has been this way for years.   Skin:     General: Skin is warm.      Comments: Coccyx wound being followed by the wound care team  Rash in her abdominal folds nystatin powder ordered   Neurological:      Mental Status: She is alert and oriented to person, place,  and time.   Psychiatric:         Behavior: Behavior normal.           LABS:   Recent Results (from the past 240 hour(s))   Basic Metabolic Panel   Result Value Ref Range    Sodium 137 136 - 145 mmol/L    Potassium 4.0 3.5 - 5.0 mmol/L    Chloride 106 98 - 107 mmol/L    CO2 23 22 - 31 mmol/L    Anion Gap, Calculation 8 5 - 18 mmol/L    Glucose 92 70 - 125 mg/dL    Calcium 8.8 8.5 - 10.5 mg/dL    BUN 23 8 - 28 mg/dL    Creatinine 0.80 0.60 - 1.10 mg/dL    GFR MDRD Af Amer >60 >60 mL/min/1.73m2    GFR MDRD Non Af Amer >60 >60 mL/min/1.73m2   CK Total   Result Value Ref Range    CK, Total 48 30 - 190 U/L       ASSESSMENT:      ICD-10-CM    1. Accelerated hypertension I10    2. Status post total replacement of right hip Z96.641    3. Pain management R52        PLAN:      Hypertension continue atenolol and hydrochlorothiazide blood pressure stable    Coccyx wound continue dressing changes she is being followed by the wound care team    Pain management patient reports pain controlled on scheduled Tylenol    Anticoagulation currently on 81 mg twice daily    Reduction of a right dislocated hip patient underwent reduction in the OR under anesthesia failed attempt in the ER, patient with a hinged hip brace    Debility PT OT    Rhabdomyolysis-elevated CK on 1/6/2020 and now within normal limits at 48    Electronically signed by: Nazanin Gonsalves CNP

## 2021-06-05 NOTE — PROGRESS NOTES
[facility]    Facility:   [facility] [[id number]]   Code Status: FULL CODE      CHIEF COMPLAINT/REASON FOR VISIT:  Chief Complaint   Patient presents with     Review Of Multiple Medical Conditions       HISTORY:      HPI: Mitzi is a 85 y.o. female undergoing physical and occupational therapy at [facility] transitional care unit.  She is with past medical history of hypertension, hematoma, chronic kidney disease stage II-III and osteoarthritis. TodayDebility PT OT therapy to evaluate for lymph wraps who presented to the emergency department for evaluation of right hip pain after mechanical fall.  She underwent a reduction under anesthesia.  She is with a history of a total right hip arthroplasty the femoral prosthesis is dislocated superiorly and no fracture.  She also had a hypertension crisis during her hospitalization per hospital report probably secondary to pain she did receive IV hydralazine.    Today she is seen as a routine visit to review multiple medical issues.  She denies chest pain or shortness of breath.  She is moving her bowels and has  no issues with urination.  She is alert and oriented x4.   Her pain is controlled.  She did follow-up with orthopedics on 1/20/2020 and she is to continue with her abductor brace for 2 more weeks orthopedics had no concerns and she will follow-up as needed.     Her CK is WNL at  48.  She is a pivot transfer and walking the bars in therapy.  Denies any congestion but reports an occasional nonproductive cough.    Past Medical History:   Diagnosis Date     Basal cell carcinoma 9/2000    forehead     Bunion      Cellulitis      Fatty liver      Hammer toe      Hypertension      Open wound(s) (multiple) of unspecified site(s), without mention of complication      Papilloma of breast              Family History   Problem Relation Age of Onset     No Medical Problems Mother      Stroke Father      No Medical  Problems Sister      No Medical Problems Daughter      No Medical Problems Maternal Grandmother      No Medical Problems Maternal Grandfather      No Medical Problems Paternal Grandmother      No Medical Problems Paternal Grandfather      No Medical Problems Maternal Aunt      No Medical Problems Paternal Aunt      BRCA 1/2 Neg Hx      Breast cancer Neg Hx      Cancer Neg Hx      Colon cancer Neg Hx      Endometrial cancer Neg Hx      Ovarian cancer Neg Hx      Social History     Socioeconomic History     Marital status:      Spouse name: Not on file     Number of children: Not on file     Years of education: Not on file     Highest education level: Not on file   Occupational History     Not on file   Social Needs     Financial resource strain: Not on file     Food insecurity:     Worry: Not on file     Inability: Not on file     Transportation needs:     Medical: Not on file     Non-medical: Not on file   Tobacco Use     Smoking status: Never Smoker     Smokeless tobacco: Never Used   Substance and Sexual Activity     Alcohol use: No     Drug use: Never     Sexual activity: Not on file   Lifestyle     Physical activity:     Days per week: Not on file     Minutes per session: Not on file     Stress: Not on file   Relationships     Social connections:     Talks on phone: Not on file     Gets together: Not on file     Attends Spiritism service: Not on file     Active member of club or organization: Not on file     Attends meetings of clubs or organizations: Not on file     Relationship status: Not on file     Intimate partner violence:     Fear of current or ex partner: Not on file     Emotionally abused: Not on file     Physically abused: Not on file     Forced sexual activity: Not on file   Other Topics Concern     Not on file   Social History Narrative    Living at home alone.  and has 1 daughter in [place].          Review of Systems   Constitutional: Positive for activity change and fatigue. Negative  for appetite change and fever.   HENT: Negative for congestion.    Respiratory: Negative for cough, shortness of breath and wheezing.    Cardiovascular: Negative for chest pain and leg swelling.   Gastrointestinal: Negative for abdominal distention, abdominal pain, constipation, diarrhea and nausea.   Genitourinary: Negative for dysuria.   Musculoskeletal: Positive for arthralgias. Negative for back pain.   Skin: Positive for wound. Negative for color change.   Neurological: Negative for dizziness.   Psychiatric/Behavioral: Negative for agitation, behavioral problems and confusion.       Vitals:    02/04/20 0903   BP: 154/52   Pulse: (!) 52   Resp: 16   Temp: 97.5  F (36.4  C)   SpO2: 97%   Weight: 161 lb (73 kg)       Physical Exam  Constitutional:       Appearance: She is well-developed.      Comments: Pleasant woman in no acute distress   HENT:      Head: Normocephalic.   Eyes:      Conjunctiva/sclera: Conjunctivae normal.   Neck:      Musculoskeletal: Normal range of motion.   Cardiovascular:      Rate and Rhythm: Normal rate and regular rhythm.      Heart sounds: Normal heart sounds. No murmur.   Pulmonary:      Effort: No respiratory distress.      Breath sounds: Normal breath sounds. No wheezing or rales.   Abdominal:      General: Bowel sounds are normal. There is no distension.      Palpations: Abdomen is soft.      Tenderness: There is no abdominal tenderness.   Musculoskeletal: Normal range of motion.      Comments: A hinged hip brace  Decreased range of motion left upper extremity she reports it has been this way for years.   Skin:     General: Skin is warm.      Comments: Coccyx wound being followed by the wound care team  Rash in her abdominal folds nystatin powder ordered   Neurological:      Mental Status: She is alert and oriented to person, place, and time.   Psychiatric:         Behavior: Behavior normal.           LABS:   No results found for this or any previous visit (from the past 240  hour(s)).    ASSESSMENT:      ICD-10-CM    1. Posterior dislocation of right hip, initial encounter (H) S73.014A    2. Pain management R52    3. Anticoagulation adequate Z79.01        PLAN:      Hypertension continue atenolol and hydrochlorothiazide blood pressure stable    Coccyx wound continue dressing changes she is being followed by the wound care team    Pain management patient reports pain controlled on scheduled Tylenol    Anticoagulation currently on 81 mg twice daily    Reduction of a right dislocated hip patient underwent reduction in the OR under anesthesia failed attempt in the ER, patient with a hinged hip brace    Debility PT OT    Rhabdomyolysis-elevated CK on 1/6/2020 and now within normal limits at 48    Electronically signed by: Nazanin Gonsalves CNP

## 2021-06-05 NOTE — PROGRESS NOTES
[facility]      Facility:    [facility] [[id number]]  Code Status: FULL CODE       Chief Complaint/Reason for Visit:  Chief Complaint   Patient presents with     H & P     Admit note to TCU-right hip dislocation s/p closed reduction, no fracture.        HPI:   Mitzi is a 85 y.o. female with hx of HTN and prior right ODETTE, presented to the hospital on 1/4/20 after a fall at home with right hip dislocation.     Hospital HPI:  Mitzi De Santiago is a 85 years old female with a past medical history of hypertension, hammer toe, and  osteoarthritis s/p total right hip arthroplasty,  who presents to this ED for evaluation of  right hip pain after a mechanical fall. Basically the patient reports a mechanical fall just prior to her arrival to the ED.  She notes losing her balance while trying to lower herself onto the toilet.  Patient states slowing doing the splits during her fall without any head injury or loss of consciousness.   She currently notes some right hip pain, upper right leg pain, and right knee pain.  Her pain is a constant 8/10, which is provoked with movement and palpation.  EMS at her care facility reported that the patient was unable to ambulate after her fall due to her significant pain.  She denies any weakness in her legs or abdominal pain.  Of note the patient uses a cane or walker at baseline. She denies any chest pain or shortness of breath.  No change in medication recently or history of recent travel.    Hospital DC Summary:  Mitzi De Santiago is a 85 y.o. female with HTN, CKD 3, OA presented after a fall found to have dislocation of her ODETTE, status post reduction     #Dislocation of right ODETTE  Patient slipped on her bath mat at home, laid on the floor for 2 hours before neighbor came home and she could call for help. Came in found to have dislocated her right ODETTE  Underwent closed reduction under anesthesia by Dr Em on 1/4  Doing well postop  Supposed to  wear hip abduction brace to be worn when out of bed with limit of 75 degrees of flexion and 15 degrees of abduction  Orthopedics advised lower dose aspirin for the time being  Follow up with orthopedics in 1-2 weeks in clinic  Patient is going to TCU    #Fall  Mechanical fall, slipped on bathmat  May benefit from home safety eval after discharge from TCU  Also needs to wear her LifeAlert which she already has but has not been wearing  With decreasing mobility recently has developed a sacral pressure ulcer and may need more help at home     #Anemia  Patient with Hgb 10.8-->7.5 here after admission. She was probably dehydrated after lying on her bathroom floor for some time, suspect 10.8 is an overestimate of her true baseline  May have had some muscular bleeding associated with her dislocation, Hgb stable now though  Did receive 2 units pRBCs here, Hgb now 9.6  Recommend Hgb recheck in clinic in a few weeks. Consider additional workup if still significantly low.     #Mild rhabdomyolysis  CK peaked at 2927, coming down now  Patient was treated with IVF  Cr was 1.3 on admission, downtrended nicely    Overall stabilized and discharged to TCU on 1/7/20 for PT, OT, nursing cares, medical management and monitoring.     Today:  Pain is well controlled. No pain in right hip. She has abductor brace, will need follow up with ortho due to restrictions. WBAT. Working  With therapy. She lives alone independent. Not ready to go home yet. Appetite is good. No diarrhea or constipation. No abdominal pain or urinary sx. No fever, cough or congestion. She denies shortness of breath or chest pain. No dizziness. No new vision or hearing problems.       Past Medical History:  Past Medical History:   Diagnosis Date     Basal cell carcinoma 9/2000    forehead     Bunion      Cellulitis      Fatty liver      Hammer toe      Hypertension      Open wound(s) (multiple) of unspecified site(s), without mention of complication      Papilloma of  breast            Surgical History:  Past Surgical History:   Procedure Laterality Date     BREAST BIOPSY Left     benign nipple removed     bunion and hammertoe Right 1992     CATARACT EXTRACTION  1994     CLOSED REDUCTION HIP DISLOCATION Right 1/4/2020    Procedure: CLOSED REDUCTION, HIP;  Surgeon: Dariel Em MD;  Location: [facility] OR;  Service: Orthopedics     DILATION AND CURETTAGE OF UTERUS      x2     TOTAL ABDOMINAL HYSTERECTOMY W/ BILATERAL SALPINGOOPHORECTOMY  1980     TOTAL HIP ARTHROPLASTY Right 2009       Family History:   Family History   Problem Relation Age of Onset     No Medical Problems Mother      Stroke Father      No Medical Problems Sister      No Medical Problems Daughter      No Medical Problems Maternal Grandmother      No Medical Problems Maternal Grandfather      No Medical Problems Paternal Grandmother      No Medical Problems Paternal Grandfather      No Medical Problems Maternal Aunt      No Medical Problems Paternal Aunt      BRCA 1/2 Neg Hx      Breast cancer Neg Hx      Cancer Neg Hx      Colon cancer Neg Hx      Endometrial cancer Neg Hx      Ovarian cancer Neg Hx        Social History:    Social History     Socioeconomic History     Marital status:      Spouse name: Not on file     Number of children: Not on file     Years of education: Not on file     Highest education level: Not on file   Occupational History     Not on file   Social Needs     Financial resource strain: Not on file     Food insecurity:     Worry: Not on file     Inability: Not on file     Transportation needs:     Medical: Not on file     Non-medical: Not on file   Tobacco Use     Smoking status: Never Smoker     Smokeless tobacco: Never Used   Substance and Sexual Activity     Alcohol use: No     Drug use: Never     Sexual activity: Not on file   Lifestyle     Physical activity:     Days per week: Not on file     Minutes per session: Not on file     Stress: Not on file   Relationships      Social connections:     Talks on phone: Not on file     Gets together: Not on file     Attends Cheondoism service: Not on file     Active member of club or organization: Not on file     Attends meetings of clubs or organizations: Not on file     Relationship status: Not on file     Intimate partner violence:     Fear of current or ex partner: Not on file     Emotionally abused: Not on file     Physically abused: Not on file     Forced sexual activity: Not on file   Other Topics Concern     Not on file   Social History Narrative    Living at home alone.  and has 1 daughter in [place].        Medications:  Current Outpatient Medications   Medication Sig     acetaminophen (TYLENOL) 500 MG tablet Take 2 tablets (1,000 mg total) by mouth 3 (three) times a day.     aspirin 81 mg chewable tablet Chew 1 tablet (81 mg total) 2 (two) times a day.     atenolol (TENORMIN) 50 MG tablet Take 50 mg by mouth daily.     CALCIUM CARBONATE (CALCIUM 500 ORAL) Take 1 tablet by mouth daily.      glucosamine sulfate 500 mg cap Take 500 mg by mouth daily.     hydrochlorothiazide (HYDRODIURIL) 25 MG tablet Take 25 mg by mouth daily.     omega-3 fatty acids 1,000 mg cap Take by mouth daily.     polyethylene glycol (MIRALAX) 17 gram packet Take 1 packet (17 g total) by mouth daily as needed (constipation).     potassium chloride (KLOR-CON) 10 MEQ CR tablet Take 10 mEq by mouth daily.     senna-docusate (PERICOLACE) 8.6-50 mg tablet Take 1 tablet by mouth 2 (two) times a day.       Allergies:  Allergies   Allergen Reactions     Penicillins Nausea And Vomiting        Review of Systems:  Pertinent items are noted in HPI.      Physical Exam:   General: Patient is alert female, no distress.   Vitals: /78, Temp 98, Pulse 60, RR 20, O2 sat 99%RA.  HEENT: Head is NCAT. Eyes show no injection or icterus. Nares negative. Oropharynx well hydrated.  Neck: Supple. No tenderness or adenopathy. No JVD.  Lungs: Clear bilaterally. No  wheezes.  Cardiovascular: Regular rate and rhythm, normal S1, S2.  Back: No spinal or CVA tenderness.  Abdomen: Soft, no tenderness on exam. Bowel sounds present. No guarding rebound or rigidity.  : Deferred.  Extremities: Mild distal LE edema is noted.  Musculoskeletal: Right hip abductor brace.   Skin: Warm and dry.   Psych: Mood appears good.      Labs:  Lab Results   Component Value Date    WBC 4.7 01/06/2020    HGB 9.6 (L) 01/07/2020    HCT 21.4 (L) 01/06/2020     (H) 01/06/2020     (L) 01/06/2020     Results for orders placed or performed in visit on 01/13/20   Basic Metabolic Panel   Result Value Ref Range    Sodium 137 136 - 145 mmol/L    Potassium 4.0 3.5 - 5.0 mmol/L    Chloride 106 98 - 107 mmol/L    CO2 23 22 - 31 mmol/L    Anion Gap, Calculation 8 5 - 18 mmol/L    Glucose 92 70 - 125 mg/dL    Calcium 8.8 8.5 - 10.5 mg/dL    BUN 23 8 - 28 mg/dL    Creatinine 0.80 0.60 - 1.10 mg/dL    GFR MDRD Af Amer >60 >60 mL/min/1.73m2    GFR MDRD Non Af Amer >60 >60 mL/min/1.73m2         Assessment/Plan:  1. Right hip dislocation. Hx of prior ODETTE. No fracture. Underwent closed reduction in OR on 1/4/20. Continue with abduction brace. Follow up with ortho.   2. HTN. Continue atenolol and HCTZ. Monitor BPs in TCU, currently acceptable.   3. ABLA. Hgb down to 7.5 in the hospital. She did receive transfusion. Last hgb up to 9.6.  4. Rhabdomyolysis. Treated with IVFs. Improved.   5. PU sacral area. Wound care orders in place.  6. CKD. Labs as noted above.  7. Weakness and deconditioning. Here for therapy.   8. Code status is full code.        Total time greater than 45 minutes, greater than 50% counseling and coordination of care, time spent in interview and examination of patient, discussion with nursing staff. CC time includes review of current orthopedic situation, restrictions, need to wear brace and follow up with ortho. Management of HTN, anemia, rhabdo with IVFs in the hospital. Sacral wound  improving. Expected course of therapy in TCU. Questions answered.          Electronically signed by: Geni Brandon MD

## 2021-06-06 NOTE — PROGRESS NOTES
[facility]    Facility:   [facility] [[id number]]   Code Status: FULL CODE      CHIEF COMPLAINT/REASON FOR VISIT:  Chief Complaint   Patient presents with     Review Of Multiple Medical Conditions       HISTORY:      HPI: Mitzi is a 85 y.o. female undergoing physical and occupational therapy at [facility] transitional care unit.  She is with past medical history of hypertension, hematoma, chronic kidney disease stage II-III and osteoarthritis. Today[facility] PT OT therapy to evaluate for lymph wraps who presented to the emergency department for evaluation of right hip pain after mechanical fall.  She underwent a reduction under anesthesia.  She is with a history of a total right hip arthroplasty the femoral prosthesis is dislocated superiorly and no fracture.  She also had a hypertension crisis during her hospitalization per hospital report probably secondary to pain she did receive IV hydralazine.    Today she is seen for a routine medical  visit.  She will going  home this morning for a home safety evaluation and our  facility therapist will be meeting her at her home.    She denies chest pain or shortness of breath.  She is moving her bowels and has  no issues with urination.  She is alert and oriented x4.   She denies pain  She is no longer wearing the abductor brace.  She is ambulating with a walker.  Her CK is WNL at  48.    Denies any congestion or cough.     Past Medical History:   Diagnosis Date     Basal cell carcinoma 9/2000    forehead     Bunion      Cellulitis      Fatty liver      Hammer toe      Hypertension      Open wound(s) (multiple) of unspecified site(s), without mention of complication      Papilloma of breast              Family History   Problem Relation Age of Onset     No Medical Problems Mother      Stroke Father      No Medical Problems Sister      No Medical Problems Daughter      No Medical Problems Maternal Grandmother       No Medical Problems Maternal Grandfather      No Medical Problems Paternal Grandmother      No Medical Problems Paternal Grandfather      No Medical Problems Maternal Aunt      No Medical Problems Paternal Aunt      BRCA 1/2 Neg Hx      Breast cancer Neg Hx      Cancer Neg Hx      Colon cancer Neg Hx      Endometrial cancer Neg Hx      Ovarian cancer Neg Hx      Social History     Socioeconomic History     Marital status:      Spouse name: Not on file     Number of children: Not on file     Years of education: Not on file     Highest education level: Not on file   Occupational History     Not on file   Social Needs     Financial resource strain: Not on file     Food insecurity:     Worry: Not on file     Inability: Not on file     Transportation needs:     Medical: Not on file     Non-medical: Not on file   Tobacco Use     Smoking status: Never Smoker     Smokeless tobacco: Never Used   Substance and Sexual Activity     Alcohol use: No     Drug use: Never     Sexual activity: Not on file   Lifestyle     Physical activity:     Days per week: Not on file     Minutes per session: Not on file     Stress: Not on file   Relationships     Social connections:     Talks on phone: Not on file     Gets together: Not on file     Attends Tenriism service: Not on file     Active member of club or organization: Not on file     Attends meetings of clubs or organizations: Not on file     Relationship status: Not on file     Intimate partner violence:     Fear of current or ex partner: Not on file     Emotionally abused: Not on file     Physically abused: Not on file     Forced sexual activity: Not on file   Other Topics Concern     Not on file   Social History Narrative    Living at home alone.  and has 1 daughter in [place].          Review of Systems   Constitutional: Positive for activity change and fatigue. Negative for appetite change and fever.   HENT: Negative for congestion.    Respiratory: Negative for cough,  shortness of breath and wheezing.    Cardiovascular: Negative for chest pain and leg swelling.   Gastrointestinal: Negative for abdominal distention, abdominal pain, constipation, diarrhea and nausea.   Genitourinary: Negative for dysuria.   Musculoskeletal: Positive for arthralgias. Negative for back pain.   Skin: Positive for wound. Negative for color change.   Neurological: Negative for dizziness.   Psychiatric/Behavioral: Negative for agitation, behavioral problems and confusion.       Vitals:    02/25/20 0931   BP: 149/66   Pulse: 66   Resp: 20   Temp: 98.7  F (37.1  C)   SpO2: 97%   Weight: 163 lb 6.4 oz (74.1 kg)       Physical Exam  Constitutional:       Appearance: She is well-developed.      Comments: Pleasant woman in no acute distress   HENT:      Head: Normocephalic.   Eyes:      Conjunctiva/sclera: Conjunctivae normal.   Neck:      Musculoskeletal: Normal range of motion.   Cardiovascular:      Rate and Rhythm: Normal rate and regular rhythm.      Heart sounds: Normal heart sounds. No murmur.   Pulmonary:      Effort: No respiratory distress.      Breath sounds: Normal breath sounds. No wheezing or rales.   Abdominal:      General: Bowel sounds are normal. There is no distension.      Palpations: Abdomen is soft.      Tenderness: There is no abdominal tenderness.   Musculoskeletal: Normal range of motion.      Comments:   Decreased range of motion left upper extremity she reports it has been this way for years.   Skin:     General: Skin is warm.      Comments: Coccyx wound being followed by the wound care team healing well per staff.      Neurological:      Mental Status: She is alert and oriented to person, place, and time.   Psychiatric:         Behavior: Behavior normal.           LABS:   No results found for this or any previous visit (from the past 240 hour(s)).    ASSESSMENT:      ICD-10-CM    1. Posterior dislocation of right hip, initial encounter (H) S73.014A    2. Pain management R52    3.  Encounter for home safety review for injury prevention Z71.89        PLAN:      Pt going home today for an in home safety evaluation      hypertension continue atenolol and hydrochlorothiazide blood pressure stable    Coccyx wound continue dressing changes she is being followed by the wound care team    Pain management patient reports pain controlled on scheduled Tylenol    Anticoagulation currently on 81 mg twice daily    Reduction of a right dislocated hip patient underwent reduction in the OR under anesthesia failed attempt in the ER, She no longer needs to wear the abductor brace. Ambulating with a walker     [facility] PT OT    Rhabdomyolysis-elevated CK on 1/6/2020 and now within normal limits at 48    Electronically signed by: Nazanin Gonsalves CNP

## 2021-06-06 NOTE — TELEPHONE ENCOUNTER
[facility] Nurse Triage Telephone Note      Provider: TRE Newman  Facility: [facility] Type: TCU    Caller: Ute  Call Back Number:  [phone]    Allergies: Penicillins    Reason for call: Nurse reporting Heme 2, BMP, CK, and UA results.  Patient has been running fevers lately.  VS:  T=99.6, P=62, R=18, BP=160/70, O2=98%RA.  Urine was noted to be cloudy.  Notable meds:  Atenolol 50mg daily, HCTZ 25mg daily, potassium 10meq daily.  Patient does have 1+ bilateral LE edema and wears TEDS.       Verbal Order/Direction given by Provider: Encourage fluids.  Wait for UC and sensitivities.  Check BMP on 3/9/20.      Provider giving order: TRE Newman    Verbal order given to: Ute Sainz RN

## 2021-06-06 NOTE — PROGRESS NOTES
[facility]    Facility:   [facility] [[id number]]   Code Status: FULL CODE      CHIEF COMPLAINT/REASON FOR VISIT:  Chief Complaint   Patient presents with     Problem Visit     left leg clllulitis       HISTORY:      HPI: Mitzi is a 85 y.o. female undergoing physical and occupational therapy at [facility] transitional care unit.  She is with past medical history of hypertension, hematoma, chronic kidney disease stage II-III and osteoarthritis. TodayDebility PT OT therapy to evaluate for lymph wraps who presented to the emergency department for evaluation of right hip pain after mechanical fall.  She underwent a reduction under anesthesia.  She is with a history of a total right hip arthroplasty the femoral prosthesis is dislocated superiorly and no fracture.  She also had a hypertension crisis during her hospitalization per hospital report probably secondary to pain she did receive IV hydralazine.    Today she is seen for reports of  left medial leg redness. She was noted to have redness inner distal thigh. She reports it started over the weekend. A RLE US and Uric acid were both negative.  She was started on Keflex three times a day per renal dosing. She was recently treated for a positive UTI with accompanying fevers.   Her culture was >100,00 E Coli and she was treated with bactrim DS x 3 days. She denies  pain with urination. Her LS were clear and she had no cough .    She denies chest pain or shortness of breath.  She is moving her bowels.  She is alert and oriented x4.   She denies pain  She is no longer wearing the abductor brace but is with hip precautions.  She is ambulating with a walker. Today she is afebrile at 98.3. Labs pending She will discharge in the middle of the week.         Past Medical History:   Diagnosis Date     Basal cell carcinoma 9/2000    forehead     Bunion      Cellulitis      Fatty liver      Hammer toe      Hypertension       Open wound(s) (multiple) of unspecified site(s), without mention of complication      Papilloma of breast              Family History   Problem Relation Age of Onset     No Medical Problems Mother      Stroke Father      No Medical Problems Sister      No Medical Problems Daughter      No Medical Problems Maternal Grandmother      No Medical Problems Maternal Grandfather      No Medical Problems Paternal Grandmother      No Medical Problems Paternal Grandfather      No Medical Problems Maternal Aunt      No Medical Problems Paternal Aunt      BRCA 1/2 Neg Hx      Breast cancer Neg Hx      Cancer Neg Hx      Colon cancer Neg Hx      Endometrial cancer Neg Hx      Ovarian cancer Neg Hx      Social History     Socioeconomic History     Marital status:      Spouse name: Not on file     Number of children: Not on file     Years of education: Not on file     Highest education level: Not on file   Occupational History     Not on file   Social Needs     Financial resource strain: Not on file     Food insecurity     Worry: Not on file     Inability: Not on file     Transportation needs     Medical: Not on file     Non-medical: Not on file   Tobacco Use     Smoking status: Never Smoker     Smokeless tobacco: Never Used   Substance and Sexual Activity     Alcohol use: No     Drug use: Never     Sexual activity: Not on file   Lifestyle     Physical activity     Days per week: Not on file     Minutes per session: Not on file     Stress: Not on file   Relationships     Social connections     Talks on phone: Not on file     Gets together: Not on file     Attends Buddhist service: Not on file     Active member of club or organization: Not on file     Attends meetings of clubs or organizations: Not on file     Relationship status: Not on file     Intimate partner violence     Fear of current or ex partner: Not on file     Emotionally abused: Not on file     Physically abused: Not on file     Forced sexual activity: Not  on file   Other Topics Concern     Not on file   Social History Narrative    Living at home alone.  and has 1 daughter in [place].          Review of Systems   Constitutional: Positive for activity change and fatigue. Negative for appetite change and fever.   HENT: Negative for congestion.    Respiratory: Negative for cough, shortness of breath and wheezing.    Cardiovascular: Negative for chest pain and leg swelling.   Gastrointestinal: Negative for abdominal distention, abdominal pain, constipation, diarrhea and nausea.        Pt with an umbilical hernia    Genitourinary: Negative for dysuria.   Musculoskeletal: Positive for arthralgias. Negative for back pain.   Skin: Positive for wound. Negative for color change.   Neurological: Negative for dizziness.   Psychiatric/Behavioral: Negative for agitation, behavioral problems and confusion.       Vitals:    03/09/20 0811   BP: 135/64   Pulse: 76   SpO2: (!) 18%   Weight: 162 lb 12.8 oz (73.8 kg)       Physical Exam  Constitutional:       Appearance: She is well-developed.      Comments: Pleasant woman in no acute distress   HENT:      Head: Normocephalic.   Eyes:      Conjunctiva/sclera: Conjunctivae normal.   Neck:      Musculoskeletal: Normal range of motion.   Cardiovascular:      Rate and Rhythm: Normal rate and regular rhythm.      Heart sounds: Normal heart sounds. No murmur.   Pulmonary:      Effort: No respiratory distress.      Breath sounds: Normal breath sounds. No wheezing or rales.   Abdominal:      General: Bowel sounds are normal. There is no distension.      Palpations: Abdomen is soft.      Tenderness: There is no abdominal tenderness.   Musculoskeletal: Normal range of motion.      Comments:   Decreased range of motion left upper extremity she reports it has been this way for years.   Skin:     General: Skin is warm.      Comments: Coccyx wound being followed by the wound care team healing well per staff.     Redness and warmth right medial   distal thigh      Neurological:      Mental Status: She is alert and oriented to person, place, and time.   Psychiatric:         Behavior: Behavior normal.           LABS:   Recent Results (from the past 240 hour(s))   Urinalysis   Result Value Ref Range    Color, UA Yellow Colorless, Yellow, Straw, Light Yellow    Clarity, UA Cloudy (!) Clear    Glucose, UA Negative Negative    Bilirubin, UA Negative Negative    Ketones, UA Negative Negative    Specific Gravity, UA 1.016 1.001 - 1.030    Blood, UA Trace (!) Negative    pH, UA 5.0 4.5 - 8.0    Protein, UA 30 mg/dL (!) Negative mg/dL    Urobilinogen, UA <2.0 E.U./dL <2.0 E.U./dL, 2.0 E.U./dL    Nitrite, UA Negative Negative    Leukocytes, UA Large (!) Negative    Bacteria, UA Many (!) None Seen hpf    RBC, UA 0-2 None Seen, 0-2 hpf    WBC, UA >100 (!) None Seen, 0-5 hpf    Squam Epithel, UA 25-50 (!) None Seen, 0-5 lpf    WBC Clumps Present (!) None Seen    Mucus, UA Few (!) None Seen lpf    Hyaline Casts, UA 0-5 0-5, None Seen lpf   Culture, Urine    Specimen: Urine   Result Value Ref Range    Culture >100,000 col/ml Escherichia coli (!)        Susceptibility    Escherichia coli - MAR     Amoxicillin + Clavulanate <=4/2 Sensitive      Ampicillin <=4 Sensitive      Cefazolin <=1 Sensitive      Cefepime <=1 Sensitive      Ceftriaxone <=1 Sensitive      Ciprofloxacin <=0.5 Sensitive      Gentamicin <=2 Sensitive      Levofloxacin <=1 Sensitive      Meropenem <=0.25 Sensitive      Nitrofurantoin <=16 Sensitive      Tetracycline <=2 Sensitive      Tobramycin <=2 Sensitive      Trimethoprim + Sulfamethoxazole <=0.5 Sensitive    Basic Metabolic Panel   Result Value Ref Range    Sodium 135 (L) 136 - 145 mmol/L    Potassium 3.8 3.5 - 5.0 mmol/L    Chloride 101 98 - 107 mmol/L    CO2 24 22 - 31 mmol/L    Anion Gap, Calculation 10 5 - 18 mmol/L    Glucose 121 70 - 125 mg/dL    Calcium 9.8 8.5 - 10.5 mg/dL    BUN 38 (H) 8 - 28 mg/dL    Creatinine 1.18 (H) 0.60 - 1.10 mg/dL     GFR MDRD Af Amer 53 (L) >60 mL/min/1.73m2    GFR MDRD Non Af Amer 44 (L) >60 mL/min/1.73m2   CK Total   Result Value Ref Range    CK, Total 26 (L) 30 - 190 U/L   HM2(CBC w/o Differential)   Result Value Ref Range    WBC 13.3 (H) 4.0 - 11.0 thou/uL    RBC 2.87 (L) 3.80 - 5.40 mill/uL    Hemoglobin 10.2 (L) 12.0 - 16.0 g/dL    Hematocrit 30.7 (L) 35.0 - 47.0 %     (H) 80 - 100 fL    MCH 35.5 (H) 27.0 - 34.0 pg    MCHC 33.2 32.0 - 36.0 g/dL    RDW 15.8 (H) 11.0 - 14.5 %    Platelets 145 140 - 440 thou/uL    MPV 10.5 8.5 - 12.5 fL   Uric Acid   Result Value Ref Range    Uric Acid 6.8 2.0 - 7.5 mg/dL       ASSESSMENT:      ICD-10-CM    1. Cellulitis of right lower extremity  L03.115    2. Posterior dislocation of right hip, initial encounter (H)  S73.014A    3. Pain management  R52    4. Debility  R53.81        PLAN:      Cellulitis right medial thigh- Keflex three times a day x 7 days    UTI  Completed Bactrim DS two times a day x 3 days. U/A 3/9/20 negative     hypertension continue atenolol and hydrochlorothiazide blood pressure stable    Coccyx wound continue dressing changes she is being followed by the wound care team    Pain management patient reports pain controlled on scheduled Tylenol    Anticoagulation currently on 81 mg twice daily    Reduction of a right dislocated hip patient underwent reduction in the OR under anesthesia failed attempt in the ER, She no longer needs to wear the abductor brace. Ambulating with a walker but does have hip precautions.     Debility PT OT    Rhabdomyolysis-elevated CK on 1/6/2020 and now low at 26.     Electronically signed by: Nazanin Gonsalves CNP

## 2021-06-06 NOTE — PROGRESS NOTES
[facility]    Facility:   [facility] [[id number]]   Code Status: FULL CODE      CHIEF COMPLAINT/REASON FOR VISIT:  Chief Complaint   Patient presents with     Problem Visit     uti       HISTORY:      HPI: Mitzi is a 85 y.o. female undergoing physical and occupational therapy at [facility] transitional care unit.  She is with past medical history of hypertension, hematoma, chronic kidney disease stage II-III and osteoarthritis. TodayDebility PT OT therapy to evaluate for lymph wraps who presented to the emergency department for evaluation of right hip pain after mechanical fall.  She underwent a reduction under anesthesia.  She is with a history of a total right hip arthroplasty the femoral prosthesis is dislocated superiorly and no fracture.  She also had a hypertension crisis during her hospitalization per hospital report probably secondary to pain she did receive IV hydralazine.    Today she is seen for review of urine culture results. She was having fevers but today iss afebrile. Her culture was >100,00 E Coli and she was treated with bactrim DS x 3 days. She denied pain with urination. Her LS were clear and she had no cough .    She denies chest pain or shortness of breath.  She is moving her bowels.  She is alert and oriented x4.   She denies pain  She is no longer wearing the abductor brace but is with hip precautions.  She is ambulating with a walker.         Past Medical History:   Diagnosis Date     Basal cell carcinoma 9/2000    forehead     Bunion      Cellulitis      Fatty liver      Hammer toe      Hypertension      Open wound(s) (multiple) of unspecified site(s), without mention of complication      Papilloma of breast              Family History   Problem Relation Age of Onset     No Medical Problems Mother      Stroke Father      No Medical Problems Sister      No Medical Problems Daughter      No Medical Problems Maternal Grandmother       No Medical Problems Maternal Grandfather      No Medical Problems Paternal Grandmother      No Medical Problems Paternal Grandfather      No Medical Problems Maternal Aunt      No Medical Problems Paternal Aunt      BRCA 1/2 Neg Hx      Breast cancer Neg Hx      Cancer Neg Hx      Colon cancer Neg Hx      Endometrial cancer Neg Hx      Ovarian cancer Neg Hx      Social History     Socioeconomic History     Marital status:      Spouse name: Not on file     Number of children: Not on file     Years of education: Not on file     Highest education level: Not on file   Occupational History     Not on file   Social Needs     Financial resource strain: Not on file     Food insecurity:     Worry: Not on file     Inability: Not on file     Transportation needs:     Medical: Not on file     Non-medical: Not on file   Tobacco Use     Smoking status: Never Smoker     Smokeless tobacco: Never Used   Substance and Sexual Activity     Alcohol use: No     Drug use: Never     Sexual activity: Not on file   Lifestyle     Physical activity:     Days per week: Not on file     Minutes per session: Not on file     Stress: Not on file   Relationships     Social connections:     Talks on phone: Not on file     Gets together: Not on file     Attends Moravian service: Not on file     Active member of club or organization: Not on file     Attends meetings of clubs or organizations: Not on file     Relationship status: Not on file     Intimate partner violence:     Fear of current or ex partner: Not on file     Emotionally abused: Not on file     Physically abused: Not on file     Forced sexual activity: Not on file   Other Topics Concern     Not on file   Social History Narrative    Living at home alone.  and has 1 daughter in [place].          Review of Systems   Constitutional: Positive for activity change and fatigue. Negative for appetite change and fever.   HENT: Negative for congestion.    Respiratory: Negative for  cough, shortness of breath and wheezing.    Cardiovascular: Negative for chest pain and leg swelling.   Gastrointestinal: Negative for abdominal distention, abdominal pain, constipation, diarrhea and nausea.        Pt with an umbilical hernia    Genitourinary: Negative for dysuria.   Musculoskeletal: Positive for arthralgias. Negative for back pain.   Skin: Positive for wound. Negative for color change.   Neurological: Negative for dizziness.   Psychiatric/Behavioral: Negative for agitation, behavioral problems and confusion.       Vitals:    03/06/20 1022   BP: 140/80   Pulse: 68   Resp: 18   Temp: 98.7  F (37.1  C)   SpO2: 99%   Weight: 163 lb 3.2 oz (74 kg)       Physical Exam  Constitutional:       Appearance: She is well-developed.      Comments: Pleasant woman in no acute distress   HENT:      Head: Normocephalic.   Eyes:      Conjunctiva/sclera: Conjunctivae normal.   Neck:      Musculoskeletal: Normal range of motion.   Cardiovascular:      Rate and Rhythm: Normal rate and regular rhythm.      Heart sounds: Normal heart sounds. No murmur.   Pulmonary:      Effort: No respiratory distress.      Breath sounds: Normal breath sounds. No wheezing or rales.   Abdominal:      General: Bowel sounds are normal. There is no distension.      Palpations: Abdomen is soft.      Tenderness: There is no abdominal tenderness.   Musculoskeletal: Normal range of motion.      Comments:   Decreased range of motion left upper extremity she reports it has been this way for years.   Skin:     General: Skin is warm.      Comments: Coccyx wound being followed by the wound care team healing well per staff.      Neurological:      Mental Status: She is alert and oriented to person, place, and time.   Psychiatric:         Behavior: Behavior normal.           LABS:   Recent Results (from the past 240 hour(s))   Urinalysis   Result Value Ref Range    Color, UA Yellow Colorless, Yellow, Straw, Light Yellow    Clarity, UA Cloudy (!) Clear     Glucose, UA Negative Negative    Bilirubin, UA Negative Negative    Ketones, UA Negative Negative    Specific Gravity, UA 1.016 1.001 - 1.030    Blood, UA Trace (!) Negative    pH, UA 5.0 4.5 - 8.0    Protein, UA 30 mg/dL (!) Negative mg/dL    Urobilinogen, UA <2.0 E.U./dL <2.0 E.U./dL, 2.0 E.U./dL    Nitrite, UA Negative Negative    Leukocytes, UA Large (!) Negative    Bacteria, UA Many (!) None Seen hpf    RBC, UA 0-2 None Seen, 0-2 hpf    WBC, UA >100 (!) None Seen, 0-5 hpf    Squam Epithel, UA 25-50 (!) None Seen, 0-5 lpf    WBC Clumps Present (!) None Seen    Mucus, UA Few (!) None Seen lpf    Hyaline Casts, UA 0-5 0-5, None Seen lpf   Culture, Urine   Result Value Ref Range    Culture >100,000 col/ml Escherichia coli (!)        Susceptibility    Escherichia coli - MAR     Amoxicillin + Clavulanate <=4/2 Sensitive      Ampicillin <=4 Sensitive      Cefazolin <=1 Sensitive      Cefepime <=1 Sensitive      Ceftriaxone <=1 Sensitive      Ciprofloxacin <=0.5 Sensitive      Gentamicin <=2 Sensitive      Levofloxacin <=1 Sensitive      Meropenem <=0.25 Sensitive      Nitrofurantoin <=16 Sensitive      Tetracycline <=2 Sensitive      Tobramycin <=2 Sensitive      Trimethoprim + Sulfamethoxazole <=0.5 Sensitive    Basic Metabolic Panel   Result Value Ref Range    Sodium 135 (L) 136 - 145 mmol/L    Potassium 3.8 3.5 - 5.0 mmol/L    Chloride 101 98 - 107 mmol/L    CO2 24 22 - 31 mmol/L    Anion Gap, Calculation 10 5 - 18 mmol/L    Glucose 121 70 - 125 mg/dL    Calcium 9.8 8.5 - 10.5 mg/dL    BUN 38 (H) 8 - 28 mg/dL    Creatinine 1.18 (H) 0.60 - 1.10 mg/dL    GFR MDRD Af Amer 53 (L) >60 mL/min/1.73m2    GFR MDRD Non Af Amer 44 (L) >60 mL/min/1.73m2   CK Total   Result Value Ref Range    CK, Total 26 (L) 30 - 190 U/L   HM2(CBC w/o Differential)   Result Value Ref Range    WBC 13.3 (H) 4.0 - 11.0 thou/uL    RBC 2.87 (L) 3.80 - 5.40 mill/uL    Hemoglobin 10.2 (L) 12.0 - 16.0 g/dL    Hematocrit 30.7 (L) 35.0 - 47.0 %      (H) 80 - 100 fL    MCH 35.5 (H) 27.0 - 34.0 pg    MCHC 33.2 32.0 - 36.0 g/dL    RDW 15.8 (H) 11.0 - 14.5 %    Platelets 145 140 - 440 thou/uL    MPV 10.5 8.5 - 12.5 fL       ASSESSMENT:      ICD-10-CM    1. Urinary tract infection without hematuria, site unspecified N39.0        PLAN:      UTI Bactrim DS two times a day x 3 days.      hypertension continue atenolol and hydrochlorothiazide blood pressure stable    Coccyx wound continue dressing changes she is being followed by the wound care team    Pain management patient reports pain controlled on scheduled Tylenol    Anticoagulation currently on 81 mg twice daily    Reduction of a right dislocated hip patient underwent reduction in the OR under anesthesia failed attempt in the ER, She no longer needs to wear the abductor brace. Ambulating with a walker but does have hip precautions.     Debility PT OT    Rhabdomyolysis-elevated CK on 1/6/2020 and now low at 26.     Electronically signed by: Nazanin Gonsalves CNP

## 2021-06-06 NOTE — PROGRESS NOTES
[facility]    Facility:   [facility] [[id number]]   Code Status: FULL CODE      CHIEF COMPLAINT/REASON FOR VISIT:  Chief Complaint   Patient presents with     Problem Visit     bruise       HISTORY:      HPI: Mitzi is a 85 y.o. female undergoing physical and occupational therapy at [facility] transitional care unit.  She is with past medical history of hypertension, hematoma, chronic kidney disease stage II-III and osteoarthritis. TodayDebility PT OT therapy to evaluate for lymph wraps who presented to the emergency department for evaluation of right hip pain after mechanical fall.  She underwent a reduction under anesthesia.  She is with a history of a total right hip arthroplasty the femoral prosthesis is dislocated superiorly and no fracture.  She also had a hypertension crisis during her hospitalization per hospital report probably secondary to pain she did receive IV hydralazine.    Today she is seen for a bruise right leg.  Patient seen in her room and noted to have a bruise 6 cm x 7 cm on her right inner thigh.  She does not recall how she got the bruise.  She does report the only thing she can think of is she was using a ball in therapy between her legs and pressing her legs together.  She denies any pain with the bruising and it is beginning to fade in color.  She denies chest pain or shortness of breath.  She is moving her bowels and has  no issues with urination.  She is alert and oriented x4.   She denies pain  She is no longer wearing the abductor brace.  She is standing in therapy.  Her CK is WNL at  48.    Denies any congestion or cough.     Past Medical History:   Diagnosis Date     Basal cell carcinoma 9/2000    forehead     Bunion      Cellulitis      Fatty liver      Hammer toe      Hypertension      Open wound(s) (multiple) of unspecified site(s), without mention of complication      Papilloma of breast              Family History    Problem Relation Age of Onset     No Medical Problems Mother      Stroke Father      No Medical Problems Sister      No Medical Problems Daughter      No Medical Problems Maternal Grandmother      No Medical Problems Maternal Grandfather      No Medical Problems Paternal Grandmother      No Medical Problems Paternal Grandfather      No Medical Problems Maternal Aunt      No Medical Problems Paternal Aunt      BRCA 1/2 Neg Hx      Breast cancer Neg Hx      Cancer Neg Hx      Colon cancer Neg Hx      Endometrial cancer Neg Hx      Ovarian cancer Neg Hx      Social History     Socioeconomic History     Marital status:      Spouse name: Not on file     Number of children: Not on file     Years of education: Not on file     Highest education level: Not on file   Occupational History     Not on file   Social Needs     Financial resource strain: Not on file     Food insecurity:     Worry: Not on file     Inability: Not on file     Transportation needs:     Medical: Not on file     Non-medical: Not on file   Tobacco Use     Smoking status: Never Smoker     Smokeless tobacco: Never Used   Substance and Sexual Activity     Alcohol use: No     Drug use: Never     Sexual activity: Not on file   Lifestyle     Physical activity:     Days per week: Not on file     Minutes per session: Not on file     Stress: Not on file   Relationships     Social connections:     Talks on phone: Not on file     Gets together: Not on file     Attends Cheondoism service: Not on file     Active member of club or organization: Not on file     Attends meetings of clubs or organizations: Not on file     Relationship status: Not on file     Intimate partner violence:     Fear of current or ex partner: Not on file     Emotionally abused: Not on file     Physically abused: Not on file     Forced sexual activity: Not on file   Other Topics Concern     Not on file   Social History Narrative    Living at home alone.  and has 1 daughter in  [place].          Review of Systems   Constitutional: Positive for activity change and fatigue. Negative for appetite change and fever.   HENT: Negative for congestion.    Respiratory: Negative for cough, shortness of breath and wheezing.    Cardiovascular: Negative for chest pain and leg swelling.   Gastrointestinal: Negative for abdominal distention, abdominal pain, constipation, diarrhea and nausea.   Genitourinary: Negative for dysuria.   Musculoskeletal: Positive for arthralgias. Negative for back pain.   Skin: Positive for wound. Negative for color change.   Neurological: Negative for dizziness.   Psychiatric/Behavioral: Negative for agitation, behavioral problems and confusion.       Vitals:    02/13/20 1006   BP: 152/63   Pulse: (!) 58   Resp: 18   Temp: 99  F (37.2  C)   SpO2: 98%   Weight: 160 lb (72.6 kg)       Physical Exam  Constitutional:       Appearance: She is well-developed.      Comments: Pleasant woman in no acute distress   HENT:      Head: Normocephalic.   Eyes:      Conjunctiva/sclera: Conjunctivae normal.   Neck:      Musculoskeletal: Normal range of motion.   Cardiovascular:      Rate and Rhythm: Normal rate and regular rhythm.      Heart sounds: Normal heart sounds. No murmur.   Pulmonary:      Effort: No respiratory distress.      Breath sounds: Normal breath sounds. No wheezing or rales.   Abdominal:      General: Bowel sounds are normal. There is no distension.      Palpations: Abdomen is soft.      Tenderness: There is no abdominal tenderness.   Musculoskeletal: Normal range of motion.      Comments:   Decreased range of motion left upper extremity she reports it has been this way for years.   Skin:     General: Skin is warm.      Comments: Coccyx wound being followed by the wound care team healing well per staff.      Neurological:      Mental Status: She is alert and oriented to person, place, and time.   Psychiatric:         Behavior: Behavior normal.           LABS:   No results found  for this or any previous visit (from the past 240 hour(s)).    ASSESSMENT:      ICD-10-CM    1. Bruise T14.8XXA        PLAN:    Bruise-right inner thigh measuring 6 cm x 7 cm patient unaware of how she got the bruise.  Denies any pain related to the bruise there was no hematoma noted     hypertension continue atenolol and hydrochlorothiazide blood pressure stable    Coccyx wound continue dressing changes she is being followed by the wound care team    Pain management patient reports pain controlled on scheduled Tylenol    Anticoagulation currently on 81 mg twice daily    Reduction of a right dislocated hip patient underwent reduction in the OR under anesthesia failed attempt in the ER, She no longer needs to wear the abductor brace.     Debility PT OT    Rhabdomyolysis-elevated CK on 1/6/2020 and now within normal limits at 48    Electronically signed by: Nazanin Gonsalves CNP

## 2021-06-06 NOTE — PROGRESS NOTES
"[facility]    Facility:   [facility] [[id number]]   Code Status: FULL CODE      CHIEF COMPLAINT/REASON FOR VISIT:  Chief Complaint   Patient presents with     Review Of Multiple Medical Conditions       HISTORY:      HPI: Mitzi is a 85 y.o. female undergoing physical and occupational therapy at [facility] transitional care unit.  She is with past medical history of hypertension, hematoma, chronic kidney disease stage II-III and osteoarthritis. TodayDebility PT OT therapy to evaluate for lymph wraps who presented to the emergency department for evaluation of right hip pain after mechanical fall.  She underwent a reduction under anesthesia.  She is with a history of a total right hip arthroplasty the femoral prosthesis is dislocated superiorly and no fracture.  She also had a hypertension crisis during her hospitalization per hospital report probably secondary to pain she did receive IV hydralazine.    Today she is seen as a routine visit to review multiple medical issues.  She denies chest pain or shortness of breath.  She is moving her bowels and has  no issues with urination.  She is alert and oriented x4.   She denies pain  She is no longer wearing the abductor brace. She reports she is standing in therapy and walking\"a little\".     Her CK is WNL at  48.    Denies any congestion or cough. Staff reported her coccyx wound is almost healed. She has no depth or drainage.      Past Medical History:   Diagnosis Date     Basal cell carcinoma 9/2000    forehead     Bunion      Cellulitis      Fatty liver      Hammer toe      Hypertension      Open wound(s) (multiple) of unspecified site(s), without mention of complication      Papilloma of breast              Family History   Problem Relation Age of Onset     No Medical Problems Mother      Stroke Father      No Medical Problems Sister      No Medical Problems Daughter      No Medical Problems Maternal " Grandmother      No Medical Problems Maternal Grandfather      No Medical Problems Paternal Grandmother      No Medical Problems Paternal Grandfather      No Medical Problems Maternal Aunt      No Medical Problems Paternal Aunt      BRCA 1/2 Neg Hx      Breast cancer Neg Hx      Cancer Neg Hx      Colon cancer Neg Hx      Endometrial cancer Neg Hx      Ovarian cancer Neg Hx      Social History     Socioeconomic History     Marital status:      Spouse name: Not on file     Number of children: Not on file     Years of education: Not on file     Highest education level: Not on file   Occupational History     Not on file   Social Needs     Financial resource strain: Not on file     Food insecurity:     Worry: Not on file     Inability: Not on file     Transportation needs:     Medical: Not on file     Non-medical: Not on file   Tobacco Use     Smoking status: Never Smoker     Smokeless tobacco: Never Used   Substance and Sexual Activity     Alcohol use: No     Drug use: Never     Sexual activity: Not on file   Lifestyle     Physical activity:     Days per week: Not on file     Minutes per session: Not on file     Stress: Not on file   Relationships     Social connections:     Talks on phone: Not on file     Gets together: Not on file     Attends Jehovah's witness service: Not on file     Active member of club or organization: Not on file     Attends meetings of clubs or organizations: Not on file     Relationship status: Not on file     Intimate partner violence:     Fear of current or ex partner: Not on file     Emotionally abused: Not on file     Physically abused: Not on file     Forced sexual activity: Not on file   Other Topics Concern     Not on file   Social History Narrative    Living at home alone.  and has 1 daughter in [place].          Review of Systems   Constitutional: Positive for activity change and fatigue. Negative for appetite change and fever.   HENT: Negative for congestion.    Respiratory:  Negative for cough, shortness of breath and wheezing.    Cardiovascular: Negative for chest pain and leg swelling.   Gastrointestinal: Negative for abdominal distention, abdominal pain, constipation, diarrhea and nausea.   Genitourinary: Negative for dysuria.   Musculoskeletal: Positive for arthralgias. Negative for back pain.   Skin: Positive for wound. Negative for color change.   Neurological: Negative for dizziness.   Psychiatric/Behavioral: Negative for agitation, behavioral problems and confusion.       Vitals:    02/11/20 0859   BP: 148/62   Pulse: (!) 51   Resp: 16   Temp: 96.9  F (36.1  C)   SpO2: 96%   Weight: 159 lb (72.1 kg)       Physical Exam  Constitutional:       Appearance: She is well-developed.      Comments: Pleasant woman in no acute distress   HENT:      Head: Normocephalic.   Eyes:      Conjunctiva/sclera: Conjunctivae normal.   Neck:      Musculoskeletal: Normal range of motion.   Cardiovascular:      Rate and Rhythm: Normal rate and regular rhythm.      Heart sounds: Normal heart sounds. No murmur.   Pulmonary:      Effort: No respiratory distress.      Breath sounds: Normal breath sounds. No wheezing or rales.   Abdominal:      General: Bowel sounds are normal. There is no distension.      Palpations: Abdomen is soft.      Tenderness: There is no abdominal tenderness.   Musculoskeletal: Normal range of motion.      Comments:   Decreased range of motion left upper extremity she reports it has been this way for years.   Skin:     General: Skin is warm.      Comments: Coccyx wound being followed by the wound care team healing well per staff.      Neurological:      Mental Status: She is alert and oriented to person, place, and time.   Psychiatric:         Behavior: Behavior normal.           LABS:   No results found for this or any previous visit (from the past 240 hour(s)).    ASSESSMENT:      ICD-10-CM    1. Posterior dislocation of right hip, initial encounter (H) S73.014A    2. Pressure  injury of sacral region, stage 3 (H) L89.153    3. Pain management R52        PLAN:      Hypertension continue atenolol and hydrochlorothiazide blood pressure stable    Coccyx wound continue dressing changes she is being followed by the wound care team    Pain management patient reports pain controlled on scheduled Tylenol    Anticoagulation currently on 81 mg twice daily    Reduction of a right dislocated hip patient underwent reduction in the OR under anesthesia failed attempt in the ER, She no longer needs to wear the abductor brace.     Debility PT OT    Rhabdomyolysis-elevated CK on 1/6/2020 and now within normal limits at 48    Electronically signed by: Nazanin Gonsalves CNP

## 2021-06-06 NOTE — PROGRESS NOTES
[facility]    Facility:   [facility] [[id number]]   Code Status: FULL CODE      CHIEF COMPLAINT/REASON FOR VISIT:  Chief Complaint   Patient presents with     Review Of Multiple Medical Conditions       HISTORY:      HPI: Mitzi is a 85 y.o. female undergoing physical and occupational therapy at [facility] transitional care unit.  She is with past medical history of hypertension, hematoma, chronic kidney disease stage II-III and osteoarthritis. TodayDebility PT OT therapy to evaluate for lymph wraps who presented to the emergency department for evaluation of right hip pain after mechanical fall.  She underwent a reduction under anesthesia.  She is with a history of a total right hip arthroplasty the femoral prosthesis is dislocated superiorly and no fracture.  She also had a hypertension crisis during her hospitalization per hospital report probably secondary to pain she did receive IV hydralazine.    Today she is seen for a routine visit to review multiple medical issues.  She reports she is doing leg exercising and ambulating a little bit with a walker.  She no longer needs hip brace.  She feels she is making progress and getting stronger in therapy.  She denies chest pain or shortness of breath.  She is moving her bowels and has  no issues with urination.  She is alert and oriented x4.   She denies pain    Her last CK is WNL at  48.  Denies any congestion or cough.  Her weights are reviewed and she is up 4 pounds in the last week.    Past Medical History:   Diagnosis Date     Basal cell carcinoma 9/2000    forehead     Bunion      Cellulitis      Fatty liver      Hammer toe      Hypertension      Open wound(s) (multiple) of unspecified site(s), without mention of complication      Papilloma of breast              Family History   Problem Relation Age of Onset     No Medical Problems Mother      Stroke Father      No Medical Problems Sister      No  Medical Problems Daughter      No Medical Problems Maternal Grandmother      No Medical Problems Maternal Grandfather      No Medical Problems Paternal Grandmother      No Medical Problems Paternal Grandfather      No Medical Problems Maternal Aunt      No Medical Problems Paternal Aunt      BRCA 1/2 Neg Hx      Breast cancer Neg Hx      Cancer Neg Hx      Colon cancer Neg Hx      Endometrial cancer Neg Hx      Ovarian cancer Neg Hx      Social History     Socioeconomic History     Marital status:      Spouse name: Not on file     Number of children: Not on file     Years of education: Not on file     Highest education level: Not on file   Occupational History     Not on file   Social Needs     Financial resource strain: Not on file     Food insecurity:     Worry: Not on file     Inability: Not on file     Transportation needs:     Medical: Not on file     Non-medical: Not on file   Tobacco Use     Smoking status: Never Smoker     Smokeless tobacco: Never Used   Substance and Sexual Activity     Alcohol use: No     Drug use: Never     Sexual activity: Not on file   Lifestyle     Physical activity:     Days per week: Not on file     Minutes per session: Not on file     Stress: Not on file   Relationships     Social connections:     Talks on phone: Not on file     Gets together: Not on file     Attends Gnosticism service: Not on file     Active member of club or organization: Not on file     Attends meetings of clubs or organizations: Not on file     Relationship status: Not on file     Intimate partner violence:     Fear of current or ex partner: Not on file     Emotionally abused: Not on file     Physically abused: Not on file     Forced sexual activity: Not on file   Other Topics Concern     Not on file   Social History Narrative    Living at home alone.  and has 1 daughter in [place].          Review of Systems   Constitutional: Positive for activity change and fatigue. Negative for appetite change and  fever.   HENT: Negative for congestion.    Respiratory: Negative for cough, shortness of breath and wheezing.    Cardiovascular: Negative for chest pain and leg swelling.   Gastrointestinal: Negative for abdominal distention, abdominal pain, constipation, diarrhea and nausea.   Genitourinary: Negative for dysuria.   Musculoskeletal: Positive for arthralgias. Negative for back pain.   Skin: Positive for wound. Negative for color change.   Neurological: Negative for dizziness.   Psychiatric/Behavioral: Negative for agitation, behavioral problems and confusion.       Vitals:    02/18/20 1031   BP: 153/81   Pulse: 66   Resp: 18   Temp: 98.6  F (37  C)   SpO2: 97%   Weight: 160 lb 12.8 oz (72.9 kg)       Physical Exam  Constitutional:       Appearance: She is well-developed.      Comments: Pleasant woman in no acute distress   HENT:      Head: Normocephalic.   Eyes:      Conjunctiva/sclera: Conjunctivae normal.   Neck:      Musculoskeletal: Normal range of motion.   Cardiovascular:      Rate and Rhythm: Normal rate and regular rhythm.      Heart sounds: Normal heart sounds. No murmur.   Pulmonary:      Effort: No respiratory distress.      Breath sounds: Normal breath sounds. No wheezing or rales.   Abdominal:      General: Bowel sounds are normal. There is no distension.      Palpations: Abdomen is soft.      Tenderness: There is no abdominal tenderness.   Musculoskeletal: Normal range of motion.      Comments:   Decreased range of motion left upper extremity she reports it has been this way for years.   Skin:     General: Skin is warm.      Comments: Coccyx wound being followed by the wound care team healing well per staff.      Neurological:      Mental Status: She is alert and oriented to person, place, and time.   Psychiatric:         Behavior: Behavior normal.           LABS:   No results found for this or any previous visit (from the past 240 hour(s)).    ASSESSMENT:      ICD-10-CM    1. Posterior dislocation of  right hip, initial encounter (H) S73.014A    2. Pain management R52    3. Debility R53.81        PLAN:       hypertension continue atenolol and hydrochlorothiazide blood pressure stable    Coccyx wound continue dressing changes she is being followed by the wound care team    Pain management patient reports pain controlled on scheduled Tylenol    Anticoagulation currently on 81 mg twice daily    Reduction of a right dislocated hip patient underwent reduction in the OR under anesthesia failed attempt in the ER, She no longer needs to wear the abductor brace.     Debility PT OT    Rhabdomyolysis-elevated CK on 1/6/2020 and now within normal limits at 48    Electronically signed by: Nazanin Gonsalves CNP

## 2021-06-06 NOTE — PROGRESS NOTES
[facility]    Facility:   [facility] [[id number]]   Code Status: FULL CODE      CHIEF COMPLAINT/REASON FOR VISIT:  Chief Complaint   Patient presents with     Review Of Multiple Medical Conditions       HISTORY:      HPI: Mitzi is a 85 y.o. female undergoing physical and occupational therapy at [facility] transitional care unit.  She is with past medical history of hypertension, hematoma, chronic kidney disease stage II-III and osteoarthritis. TodayDebility PT OT therapy to evaluate for lymph wraps who presented to the emergency department for evaluation of right hip pain after mechanical fall.  She underwent a reduction under anesthesia.  She is with a history of a total right hip arthroplasty the femoral prosthesis is dislocated superiorly and no fracture.  She also had a hypertension crisis during her hospitalization per hospital report probably secondary to pain she did receive IV hydralazine.    Today she is seen for a routine medical  visit and fevers. She spiked a fever the day prior of 101.0 and came down to 99.6. She denied pain with urination. Her LS were clear and she had no cough .    She denies chest pain or shortness of breath.  She is moving her bowels.  She is alert and oriented x4.   She denies pain  She is no longer wearing the abductor brace but is with hip precautions.  She is ambulating with a walker.  Her  Last CK is WNL at  48.        Past Medical History:   Diagnosis Date     Basal cell carcinoma 9/2000    forehead     Bunion      Cellulitis      Fatty liver      Hammer toe      Hypertension      Open wound(s) (multiple) of unspecified site(s), without mention of complication      Papilloma of breast              Family History   Problem Relation Age of Onset     No Medical Problems Mother      Stroke Father      No Medical Problems Sister      No Medical Problems Daughter      No Medical Problems Maternal Grandmother      No  Medical Problems Maternal Grandfather      No Medical Problems Paternal Grandmother      No Medical Problems Paternal Grandfather      No Medical Problems Maternal Aunt      No Medical Problems Paternal Aunt      BRCA 1/2 Neg Hx      Breast cancer Neg Hx      Cancer Neg Hx      Colon cancer Neg Hx      Endometrial cancer Neg Hx      Ovarian cancer Neg Hx      Social History     Socioeconomic History     Marital status:      Spouse name: Not on file     Number of children: Not on file     Years of education: Not on file     Highest education level: Not on file   Occupational History     Not on file   Social Needs     Financial resource strain: Not on file     Food insecurity:     Worry: Not on file     Inability: Not on file     Transportation needs:     Medical: Not on file     Non-medical: Not on file   Tobacco Use     Smoking status: Never Smoker     Smokeless tobacco: Never Used   Substance and Sexual Activity     Alcohol use: No     Drug use: Never     Sexual activity: Not on file   Lifestyle     Physical activity:     Days per week: Not on file     Minutes per session: Not on file     Stress: Not on file   Relationships     Social connections:     Talks on phone: Not on file     Gets together: Not on file     Attends [employer] service: Not on file     Active member of club or organization: Not on file     Attends meetings of clubs or organizations: Not on file     Relationship status: Not on file     Intimate partner violence:     Fear of current or ex partner: Not on file     Emotionally abused: Not on file     Physically abused: Not on file     Forced sexual activity: Not on file   Other Topics Concern     Not on file   Social History Narrative    Living at home alone.  and has 1 daughter in [place].          Review of Systems   Constitutional: Positive for activity change and fatigue. Negative for appetite change and fever.   HENT: Negative for congestion.    Respiratory: Negative for cough,  shortness of breath and wheezing.    Cardiovascular: Negative for chest pain and leg swelling.   Gastrointestinal: Negative for abdominal distention, abdominal pain, constipation, diarrhea and nausea.        Pt with an umbilical hernia    Genitourinary: Negative for dysuria.   Musculoskeletal: Positive for arthralgias. Negative for back pain.   Skin: Positive for wound. Negative for color change.   Neurological: Negative for dizziness.   Psychiatric/Behavioral: Negative for agitation, behavioral problems and confusion.       Vitals:    03/04/20 0734   BP: 146/67   Pulse: 65   Resp: 18   Temp: (!) 101  F (38.3  C)   SpO2: 97%   Weight: 159 lb 8 oz (72.3 kg)       Physical Exam  Constitutional:       Appearance: She is well-developed.      Comments: Pleasant woman in no acute distress   HENT:      Head: Normocephalic.   Eyes:      Conjunctiva/sclera: Conjunctivae normal.   Neck:      Musculoskeletal: Normal range of motion.   Cardiovascular:      Rate and Rhythm: Normal rate and regular rhythm.      Heart sounds: Normal heart sounds. No murmur.   Pulmonary:      Effort: No respiratory distress.      Breath sounds: Normal breath sounds. No wheezing or rales.   Abdominal:      General: Bowel sounds are normal. There is no distension.      Palpations: Abdomen is soft.      Tenderness: There is no abdominal tenderness.   Musculoskeletal: Normal range of motion.      Comments:   Decreased range of motion left upper extremity she reports it has been this way for years.   Skin:     General: Skin is warm.      Comments: Coccyx wound being followed by the wound care team healing well per staff.      Neurological:      Mental Status: She is alert and oriented to person, place, and time.   Psychiatric:         Behavior: Behavior normal.           LABS:   No results found for this or any previous visit (from the past 240 hour(s)).    ASSESSMENT:      ICD-10-CM    1. Accelerated hypertension I10    2. Posterior dislocation of  right hip, initial encounter (H) S73.014A    3. Pain management R52    4. Fever, unspecified fever cause R50.9        PLAN:      Fever, Check U/A/ U/C, BMP, CBC, CK,      hypertension continue atenolol and hydrochlorothiazide blood pressure stable    Coccyx wound continue dressing changes she is being followed by the wound care team    Pain management patient reports pain controlled on scheduled Tylenol    Anticoagulation currently on 81 mg twice daily    Reduction of a right dislocated hip patient underwent reduction in the OR under anesthesia failed attempt in the ER, She no longer needs to wear the abductor brace. Ambulating with a walker but does have hip precautions.     Debility PT OT    Rhabdomyolysis-elevated CK on 1/6/2020 and now within normal limits at 48, another CK pending     Electronically signed by: Nazanin Gonsalves CNP

## 2021-06-07 NOTE — PROGRESS NOTES
Code Status:  DNR  Visit Type: Follow Up (Diarrhea, pain management, wounds.)     Facility:  [facility] [[id number]]        Facility Type: SNF (Skilled Nursing Facility, TCU)    History of Present Illness: Mitzi De Santiago is a 85 y.o. female with a past medical history for CKD, chronic anemia, mild cognitive impairment, hypertension, osteoarthritis, multiple chronic wounds.  She was recently hospitalized 5/3/2020 to 5/10/2020 after a fall at home.  She had been hospitalized in January 2020 in which she also had a fall resulting in dislocation of her prosthetic hip.  She was taken to the OR for closed reduction.  She did have a hypertensive crisis likely related to pain and anemia with a hemoglobin at 7.5.  She was given PRBCs.  It was also discovered that she had a coccyx wound that was treated.  She then was discharged to [facility] in in January in which she developed a red distal thigh and DVT was ruled out by Doppler.  She was placed on Keflex considering likely cellulitis.  She was then discharged to home against TCU's recommendations as they felt she needed 24-hour care and family could not provide this.  During the most recent hospitalization she was found to have a left distal fibular fracture.  She was evaluated by orthopedics and they recommended weightbearing as tolerated with a cam boot.  Her pain was controlled with Tylenol and tramadol.  Her hemoglobin was stable at 8.6.  She also was found to have a right thigh wound with possible abscess.  Blood cultures were negative and the wound was treated and she was placed on clindamycin.  Doppler of right leg was negative for DVT.  She also was treated with ceftriaxone for 5 days for an E. coli UTI.    Today, she reports feeling well and denies any pain.  Orthopedics has allowed her to remove the cam boot.  She is questioning why she needs to continue to take Tylenol on a scheduled basis.  Her right lower extremity has an open shallow ulcer on her  calf that is approximately 1 cm x 1 cm.  This is being treated by nursing with a foam dressing.  She also has a tunneled wound on her right inner thigh that is being packed.  Nursing reports that her wounds are improving.  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  Reports that her coccyx wound has been going on for a long time and she has been getting home care nursing at home to address that.  Her weight on admission was 163 pounds and yesterday her weight was 154.  I am unsure if this is accurate.  She reports a good appetite and does not feel that she has lost much weight.  She is being followed by dietitian and is on a nutritional supplement to help with wound healing.  She denies any calf tenderness and has good pedal pulses bilaterally.  Her left leg does have nonpitting soft edema greater than her right.        Review of Systems   Patient denies fever, chills, headache, lightheadedness, dizziness, rhinorrhea, cough, congestion, shortness of breath, chest pain, palpitations, abdominal pain, n/v, diarrhea, constipation, change in appetite, dysuria, frequency, burning or pain with urination.  Other than stated in HPI all other review of systems is negative.           Physical Exam   Vital signs: /72, heart rate 61, respirations 16, temp 98.4.  GENERAL APPEARANCE: Well developed, well nourished, in no acute distress.  HEENT: normocephalic, atraumatic, sclerae anicteric, conjunctivae clear and moist, EOM intact  External inspection of ears and nose showed no scars, lesions or masses.  LUNGS: respiratory effort normal.  CARD: Heart tones were not palpated in order to maintain social distancing during the COVID crisis.  MSK: Muscle strength and tone are equal bilaterally  EXTREMITIES: Soft nonpitting edema left greater than right mostly on the feet.    NEURO: Alert and oriented x 3.Face is symmetric.  SKIN: Shallow  small wound of 1 cm on her right calf that has good granulation without surrounding signs or symptoms of infection, deep tracking wound on right inner thigh that is being packed daily, no surrounding signs and symptoms of infection.  Did not view her coccyx wound.  PSYCH: euthymic          Labs:    Recent Results (from the past 240 hour(s))   Ferritin   Result Value Ref Range    Ferritin 335 (H) 10 - 130 ng/mL   Folate, Serum   Result Value Ref Range    Folate 15.5 >=3.5 ng/mL   Iron and Transferrin Iron Binding Capacity   Result Value Ref Range    Iron 81 42 - 175 ug/dL    Transferrin 193 (L) 212 - 360 mg/dL    Transferrin Saturation, Calculated 34 20 - 50 %    Transferrin IBC, Calculated 241 (L) 313 - 563 ug/dL   Magnesium   Result Value Ref Range    Magnesium 2.1 1.8 - 2.6 mg/dL   Sodium   Result Value Ref Range    Sodium 134 (L) 136 - 145 mmol/L   Thyroid Stimulating Hormone (TSH)   Result Value Ref Range    TSH 1.82 0.30 - 5.00 uIU/mL   Vitamin B12   Result Value Ref Range    Vitamin B-12 482 213 - 816 pg/mL   Hemoglobin   Result Value Ref Range    Hemoglobin 10.0 (L) 12.0 - 16.0 g/dL         Assessment:  1. Closed fracture of distal end of left fibula with routine healing, unspecified fracture morphology, subsequent encounter     2. Blood loss anemia     3. Pain management     4. Weight loss     5. Loose stools         Plan:   Ankle fracture: Healed, continue with therapies, pain is well managed.  On aspirin 81 mg twice daily we will continue at this time due to her decreased mobility.    Blood loss anemia: Last hemoglobin on 4/27 was 10.0.  B12 and folate were in normal range transferrin was low and so she was started on iron sulfate continue with iron sulfate.    Management: No pain at this time will discontinue tramadol and change Tylenol to PRN    Weight loss: Patient believes that an  air as she felt she had her cam boot on and multiple layers of clothes when she first was admitted.  We will  continue to monitor and continue with current supplement.    Loose stools: Patient feels her loose stools are improving slightly.  She continues to have very soft stools likely related to antibiotics.  Continue to monitor.      Electronically signed by: Danelle Pandey CNP

## 2021-06-07 NOTE — PROGRESS NOTES
Code Status:  DNR  Visit Type: Follow Up (pain, loose stools, bradycardia)     Facility:  [facility] [[id number]]        Facility Type: SNF (Skilled Nursing Facility, TCU)    History of Present Illness: Mitzi De Santiago is a 85 y.o. female with a past medical history for CKD, chronic anemia, mild cognitive impairment, hypertension, osteoarthritis, multiple chronic wounds.  She was recently hospitalized 5/3/2020 to 5/10/2020 after a fall at home.  She had been hospitalized in January 2020 in which she also had a fall resulting in dislocation of her prosthetic hip.  She was taken to the OR for closed reduction.  She did have a hypertensive crisis likely related to pain and anemia with a hemoglobin at 7.5.  She was given PRBCs.  It was also discovered that she had a coccyx wound that was treated.  She then was discharged to [facility] in in January in which she developed a red distal thigh and DVT was ruled out by Doppler.  She was placed on Keflex considering likely cellulitis.  She was then discharged to home against TCU's recommendations as they felt she needed 24-hour care and family could not provide this.  During the most recent hospitalization she was found to have a left distal fibular fracture.  She was evaluated by orthopedics and they recommended weightbearing as tolerated with a cam boot.  Her pain was controlled with Tylenol and tramadol.  Her hemoglobin was stable at 8.6.  She also was found to have a right thigh wound with possible abscess.  Blood cultures were negative and the wound was treated and she was placed on clindamycin.  Doppler of right leg was negative for DVT.  She also was treated with ceftriaxone for 5 days for an E. coli UTI.    Today, she reports that her loose stools are starting to improve with more formed stools.  She has refused to take the arginaid and nutritional supplement and feels her bowels have improved since then.  Her HR has been consistently in the 50s.  She  denies any dizziness or lightheadness.  She is on atenolol for HTN along with HCTZ and lisinopril.  Her weight continues to go down and was 149lbs today.  I believe that she has had some weight loss however wonder if 15lb loss is an error as she has been refusing to stand on the scale but rather is weighed in a wheelchair.       Review of Systems   Patient denies fever, chills, headache, lightheadedness, dizziness, rhinorrhea, cough, congestion, shortness of breath, chest pain, palpitations, abdominal pain, n/v, diarrhea, constipation, change in appetite, dysuria, frequency, burning or pain with urination.  Other than stated in HPI all other review of systems is negative.           Physical Exam   Vital signs: /58, HR 57, resp 16, temp 98.4  GENERAL APPEARANCE: Well developed, well nourished, in no acute distress.  HEENT: normocephalic, atraumatic, sclerae anicteric, conjunctivae clear and moist, EOM intact  LUNGS: respiratory effort normal.  CARD: Heart tones were not auscultated in order to maintain social distancing during the COVID crisis.  EXTREMITIES: Soft nonpitting edema left greater than right mostly on the feet.  No calf tenderness  NEURO: Alert and oriented x 3.Face is symmetric.  SKIN: nursing reports the wounds are improving with current wound cares.   PSYCH: euthymic          Labs:    Recent Results (from the past 240 hour(s))   Hemoglobin   Result Value Ref Range    Hemoglobin 10.0 (L) 12.0 - 16.0 g/dL         Assessment:  1. Closed fracture of distal end of left fibula with routine healing, unspecified fracture morphology, subsequent encounter     2. Weight loss     3. Loose stools     4. Debility         Plan:   Fracture: stable, pain is managed with prn tylenol.  Continue with therapies.     Weight loss: likely related to malnutrition, refusing nutritional drink at this time due to thought to cause loose stools.  Reports she is eating well and has a good appetite.  Continue to monitor;  appears euvolemic so I don't believe it is related to the HCTZ at this time.      Loose stools: improving, if reoccurs could consider a fiber supplement.     Bradycardia: medication induced, decreased Atenolol 25mg daily and monitor.      Debility: continue with therapies, she will need a higher level of care at [facility].  Spoke with the nursing supervisor and they are attempting to get her into the [facility] however they can not provide her wound cares at this time.            Electronically signed by: Danelle Pandey, CNP

## 2021-06-07 NOTE — PROGRESS NOTES
[facility]/ [facility]iatrics    Facility:  [facility] [[id number]]    Code Status:  DNR    Chief Complaint   Patient presents with     H & P   :                    Patient Active Problem List   Diagnosis     Accelerated essential hypertension     Basal cell carcinoma     Fatty liver     Bunion     Hammer toe     Papilloma of breast     Cellulitis     Open wound(s) (multiple) of unspecified site(s), without mention of complication     Leucocytoclastic vasculitis (H)     Knee osteoarthritis     Impingement syndrome of right shoulder     Posterior dislocation of right hip, initial encounter (H)     Traumatic rhabdomyolysis, subsequent encounter     Status post total replacement of right hip     Acute blood loss as cause of postoperative anemia     Pressure injury of sacral region, stage 3 (H)     Generalized weakness     Fall against object     Electrolyte disturbance       History:  Mitzi De Santiago  is aan 85 year old female with history of CKD 3, chronic anemia, mild cognitive impairment, hypertension, osteoarthritis, stage III coccyx wound seen for admission to TCU on 4/17/2020    Hospital Course:    1.  Hospitalized early January 2020 following a slip and fall at home resulting in dislocation of her prosthetic hip.  It could not be reduced in the emergency room and she needed to go to the OR for anesthetic reduction.  She did not require open surgery and there were no other fractures noted.  She was placed in an abductor brace.  Hospitalization was remarkable hypertensive crisis attributed to pain.  She also suffered acute blood loss anemia with hemoglobin down to 7.5.  She did receive 2 units PRBCs with hemoglobin back up to 9.6.  She experienced mild rhabdomyolysis with a CPK of 2927.  Her coccyx wound was discovered and treated as well.     Patient was discharged to [facility] where she stayed between January 8 and March 11.  Towards the end of her stay she developed a red distal thigh there  was concern about infection versus DVT.  Doppler study was negative.  She was placed on Keflex and was still on it at the time of discharge.  She also experienced an E. coli UTI and was treated with Septra without TCU stay.  TCU recommended 24-hour care for safety, it was not clear that the family could provide that, vulnerable adult report was filed by .     Patient had home services involved following her discharge from the TCU.  After couple days at home she noted that she suddenly had a lot of liquid on her close in the area of the distal right thigh swelling.  She has since had an open draining wound.    2.  Patient was admitted between April 3 and April 10 following another fall at home.  She describes being in her wheelchair going to the bathroom and when she was transferring back to her wheelchair she fell and injured her leg.  She pushed her medical alert button and help arrived.   Evaluation revealed a distal fibular fracture treated with Cam walker.  Weightbearing as tolerated.  She was seen by Ortho.  Hospitalization also remarkable for stable anemia 8.6-8.8.  She had another Doppler of her leg which again was negative for DVT.  She had a negative chest x-ray.  She had significant elevation of her CRP and the team at the hospital felt that this likely represented cellulitis/infection/possible ruptured/infected hematoma.  She was treated with Rocephin plus clindamycin, discharged on clindamycin which continues through April 8.  It does not appear wound culture was obtained.  There was no growth from blood culture.      Subjective/ROS:    -augmented by discussion with facility staff involved in direct care      Patient reports that she has no pain.  Even the draining area of her leg never hurt much and does not hurt at all now.  She says the drainage is significantly decreased since the wound has started being packed as it has significant depth according to staff but has not been measured  "(it will be today).  She reports it feels like it is healing now because it is itchy.  She said prior to it draining it was \"a big lump\" which was somewhat tender.  She does not believe any blood ever drained out of it but is not sure of what the initial cause of the fluid was.    Patient reports having trouble with the cam walker.  She says it is heavy and awkward and she anticipates physical therapy to remain slow as long she has to wear it.    Review of systems is otherwise positive for chronic carpal tunnel and finger numbness on the right and mild on the left.  She has a history of ankle edema.  She says she was in for her routine podiatry toenail work and the podiatrist noted that and sent her to her primary physician for hydrochlorothiazide was added to her treatment.  Otherwise patient says that she feels just fine.  She denies headaches change in vision speaking swallowing hearing coughing gagging orthopnea PND peripheral edema cough wheeze chest pain jaw pain arm pain back pain abdominal pain nausea vomiting diarrhea melena bright red blood per rectum dysuria.  She reports that she did have some swelling and redness in the distal right leg while she was in the hospital but that seems to have cleared up.  She says 1 of the doctors said \"it might be gout\" but she is never had it before and she describes a distribution including the non-joint pretibial area on the right side.  She has scarring on her nose from old basal cell surgery.  She is got good mood good bowel function good bladder function.  Remainder 13 system ROS negative    Past Medical History:   Diagnosis Date     Basal cell carcinoma 9/2000    forehead     Bunion      Cellulitis      Fatty liver      Hammer toe      Hypertension      Open wound(s) (multiple) of unspecified site(s), without mention of complication      Papilloma of breast      Past Surgical History:   Procedure Laterality Date     BREAST BIOPSY Left     benign nipple removed     " bunion and hammertoe Right 1992     CATARACT EXTRACTION  1994     CLOSED REDUCTION HIP DISLOCATION Right 1/4/2020    Procedure: CLOSED REDUCTION, HIP;  Surgeon: Dariel Em MD;  Location: [facility];  Service: Orthopedics     DILATION AND CURETTAGE OF UTERUS      x2     TOTAL ABDOMINAL HYSTERECTOMY W/ BILATERAL SALPINGOOPHORECTOMY  1980     TOTAL HIP ARTHROPLASTY Right 2009          Family History   Problem Relation Age of Onset     No Medical Problems Mother      Stroke Father      No Medical Problems Sister      No Medical Problems Daughter      No Medical Problems Maternal Grandmother      No Medical Problems Maternal Grandfather      No Medical Problems Paternal Grandmother      No Medical Problems Paternal Grandfather      No Medical Problems Maternal Aunt      No Medical Problems Paternal Aunt      BRCA 1/2 Neg Hx      Breast cancer Neg Hx      Cancer Neg Hx      Colon cancer Neg Hx      Endometrial cancer Neg Hx      Ovarian cancer Neg Hx    :       Social History     Socioeconomic History     Marital status:      Spouse name: Not on file     Number of children: Not on file     Years of education: Not on file     Highest education level: Not on file   Occupational History     Not on file   Social Needs     Financial resource strain: Not on file     Food insecurity     Worry: Not on file     Inability: Not on file     Transportation needs     Medical: Not on file     Non-medical: Not on file   Tobacco Use     Smoking status: Never Smoker     Smokeless tobacco: Never Used   Substance and Sexual Activity     Alcohol use: No     Drug use: Never     Sexual activity: Not on file   Lifestyle     Physical activity     Days per week: Not on file     Minutes per session: Not on file     Stress: Not on file   Relationships     Social connections     Talks on phone: Not on file     Gets together: Not on file     Attends [employer] service: Not on file     Active member of club or organization: Not  on file     Attends meetings of clubs or organizations: Not on file     Relationship status: Not on file     Intimate partner violence     Fear of current or ex partner: Not on file     Emotionally abused: Not on file     Physically abused: Not on file     Forced sexual activity: Not on file   Other Topics Concern     Not on file   Social History Narrative    Living at home alone.  and has 1 daughter in [place].    :    Patient says she is living in [place].  She has a winter downstairs.  She has a daughter who lives in [place].  She has 2 nephews who are close by.  She still drives.  She reports her   in .    Current Outpatient Medications on File Prior to Visit   Medication Sig Dispense Refill     acetaminophen (TYLENOL) 500 MG tablet Take 2 tablets (1,000 mg total) by mouth 3 (three) times a day.  0     ascorbic acid, vitamin C, (VITAMIN C) 500 MG tablet Take 500 mg by mouth daily.       aspirin 81 mg chewable tablet Chew 1 tablet (81 mg total) 2 (two) times a day.  0     atenolol (TENORMIN) 50 MG tablet Take 50 mg by mouth daily.       CALCIUM CARBONATE (CALCIUM 500 ORAL) Take 1 tablet by mouth daily.        clindamycin (CLEOCIN) 150 MG capsule Take 3 capsules (450 mg total) by mouth 3 (three) times a day for 8 days. 72 capsule 0     glucosamine sulfate 500 mg cap Take 500 mg by mouth daily.       hydrochlorothiazide (HYDRODIURIL) 25 MG tablet Take 25 mg by mouth daily.       Lactobacillus rhamnosus GG (CULTURELLE) 15 billion cell CpSP Take 1 capsule by mouth 2 (two) times a day with meals for 10 days. 20 capsule 0     lisinopriL (PRINIVIL,ZESTRIL) 10 MG tablet Take 1 tablet (10 mg total) by mouth daily. 30 tablet 0     magnesium oxide 250 mg magnesium Tab Take 250 mg by mouth daily.       nystatin (MYCOSTATIN) powder Apply 1 application topically 2 (two) times a day as needed.        omega-3 fatty acids 1,000 mg cap Take 1,000 mg by mouth daily.        polyethylene glycol (MIRALAX) 17  gram packet Take 1 packet (17 g total) by mouth daily as needed (constipation).  0     potassium chloride (KLOR-CON) 10 MEQ CR tablet Take 10 mEq by mouth daily.       traMADoL (ULTRAM) 50 mg tablet Take 1 tablet (50 mg total) by mouth every 6 (six) hours as needed. 10 tablet 0     No current facility-administered medications on file prior to visit.    :      ALLERGIES:  Penicillins    Vitals:  There were no vitals taken for this visit. except as noted below    Vital signs: Reviewed per facility EMR vitals including as follows:                Current Vitals     BP: 135/57 mmHg  4/17/2020 14:06  Temp:98.6  F  4/17/2020 14:06  Pulse: 60 bpm  4/17/2020 14:06  Weight: 163.5 Lbs  4/14/2020 16:01  Resp: 15 Breaths/min  4/17/2020 14:06  BS:  O2: 97 %  4/17/2020 14:06  Pain: 0  4/17/2020 14:06  There is no height or weight on file to calculate BMI.    Physical exam:    Patient is alert oriented pleasant oriented x3 and a good historian.  Normocephalic atraumatic sclera clear she demonstrates  very good range of motion of her neck.  She demonstrates lifting her arms up above her head.  She demonstrates hip flexion knee extension dorsi and plantar flexion of her right foot.  Her left foot and ankle are in a cam walker.    I did undress the wound on the right leg (positive PPE in place).  She has a 1.5 x 3 cm opening with very clean margins and is steep drop-off straight down into a depth of unknown length.  She has firm indurated skin and soft tissues for approximately 3 cm surrounding the wound.  There is no redness at all at this point.  The drainage on the dressings is clear yellow serous type.  I did not examine coccyx wound today but did discuss it with nursing    No edema.  Skin is clear in other visualized portions    Due to the 2020 Covid 19 pandemic, except as noted above, the patient was visually observed at a 6 foot plus distance.  An observational exam was performed in an effort to keep patient safe from Covid 19  and other communicable diseases.   Labs:  Lab Results   Component Value Date    WBC 5.9 04/17/2020    HGB 8.5 (L) 04/17/2020    HCT 26.5 (L) 04/17/2020     (H) 04/17/2020     04/17/2020     Results for orders placed or performed in visit on 04/17/20   Basic Metabolic Panel   Result Value Ref Range    Sodium 133 (L) 136 - 145 mmol/L    Potassium 4.9 3.5 - 5.0 mmol/L    Chloride 102 98 - 107 mmol/L    CO2 22 22 - 31 mmol/L    Anion Gap, Calculation 9 5 - 18 mmol/L    Glucose 92 70 - 125 mg/dL    Calcium 8.7 8.5 - 10.5 mg/dL    BUN 42 (H) 8 - 28 mg/dL    Creatinine 1.01 0.60 - 1.10 mg/dL    GFR MDRD Af Amer >60 >60 mL/min/1.73m2    GFR MDRD Non Af Amer 52 (L) >60 mL/min/1.73m2         Lab Results   Component Value Date    TSH 1.52 01/04/2020     Lab Results   Component Value Date    HGBA1C 5.4 04/04/2020     [unfilled]  Lab Results   Component Value Date    HTJRHBFP48 613 04/25/2019     No results found for: BNP  [unfilled]  Most Recent EKG     Units 04/03/20  1928   VENTRATE BPM 84   ATRIALRATE BPM 84   QRSDURATION ms 140   QTINTERVAL ms 394   QTCCALC ms 465   P [place] degrees 40   RAXIS degrees -25   TAXIS degrees -17   MUSEDX  Sinus rhythm with 1st degree A-V block  Right bundle branch block  Abnormal ECG  When compared with ECG of 04-JAN-2020 17:23,  Aberrant conduction is no longer Present  QT has shortened  Confirmed by SEE ED PROVIDER NOTE FOR, ECG INTERPRETATION ([id number]),  SERGE DON ([id number]) on 4/4/2020 1:39:05 AM         Negative COVID testing 4/7/2020    Assessment/Plan:      ICD-10-CM    1. Open wound(s) (multiple) of unspecified site(s), without mention of complication  T14.8XXA    2. Electrolyte disturbance  E87.8    3. Cellulitis of right lower extremity  L03.115    4. Pressure injury of sacral region, stage 3 (H)  L89.153        Multiple falls  January 2020 prosthetic hip dislocation  April 2020 fibular fracture   PT, OT, .  Patient has been living independently and  will need an assessment for discharge safety.  I did discuss with her whether she is ready for change to assisted living and it sounds like she would at least consider that option.  Anticipate discharge per therapies.  Wean Ultram as able.  Continue Tylenol 1000 3 times daily.  She is on glucosamine for osteoarthritis as well.    Stage 3 sacral wound   I did not examine today.  The staff reports a clean base.  She was recently seen by wound care in the hospital.  Continue those orders.  She will need outpatient follow-up as well.  This will be complicated by the coronavirus pandemic, home care, assuming she goes home most likely    Right distal thigh wound   Questionable infected hematoma.  This seems like the most possible explanation.  May have had blood catheter distally from the original hip dislocation as she did have significant blood loss anemia at that point.  She does describe a large lump without other signs of infection before rupture.  No cultures have been done so is not clear to me that it was ever truly infected.  Certainly no pus or redness at this point.  Nonetheless we will continue the clindamycin as recommended through April 18.  Continue dressing changes at least daily and as needed.  Wound will need to be packed.  We discussed the prolonged time to healing anticipated the need for ongoing wound cares.  She states understanding.    Electrolyte disturbance   Hyponatremia is mild and asymptomatic.  Most likely related to hydrochlorothiazide.  She has mild hypomagnesia and will be replaced for a week with a recheck of electrolytes next Thursday hopefully the blood work will be ready for my review on Friday.  Hydrochlorothiazide can be continued at this point.    Acute blood loss anemia  Chronic macrocytic anemia   Patient did have B12 level done in 2019.  She had TSH done in January 2020.  The cause of her macrocytosis is unknown.  I do not see that she has had a peripheral smear.  Her RDW is  elevated at 14.9.  Her white blood count and platelet lines are normal.  -We will start with folate B12 TSH TIBC ferritin iron and transferrin next Thursday.  Anticipate she will need further outpatient work-up with reticulocyte count, peripheral smear.  May need hematology involvement depending on those results.    Hypertension  Hypertensive crisis in January   Patient is on lisinopril hydrochlorothiazide atenolol.  She also takes potassium.  Blood pressures have been acceptable.  No changes were made.  Monitor electrolytes as above.  Creatinine will be checked next week as well.    CKD 3   Noted, monitor as above avoid nephrotoxins were able    Disposition   24-hour care has already been recommended by therapist at [facility].  It would be surprising if we came to a different finding here.  Social service involved, assisted living appears to be a good option with no close family who can perform 24-hour supervision.          Case discussed with:    Facility staff         Mor Flores MD

## 2021-06-07 NOTE — TELEPHONE ENCOUNTER
[facility] Nurse Triage Telephone Note      Provider: TRE Francois  Facility: [facility]    Facility Type: TCU    Caller: nurse    Allergies: Penicillins    Reason for call: Hgb 10.0, previous 8.5     Verbal Order/Direction given by Provider: MARCOS    Provider giving order: TRE Golden    Verbal order given to: nurse      Pamela Wyatt RN

## 2021-06-07 NOTE — PROGRESS NOTES
[facility]    NAME:  Mitzi De Santiago             :  1934  MRN: [id number]  CODE STATUS:  DNR    FACILITY:  [facility] [[id number]]         Chief Complaint   Patient presents with     Problem Visit     Right lower extremity sebaceous cysts     HISTORY OF PRESENT ILLNESS: Mitzi De Santiago is a 85 y.o. female with CKD 3, Chronic anemia, Cognitive impairment, Posterior hip dislocation in March - s/p surgical treatment who was sent to the hospital by the recommendations of the home care RN for evaluation of a right thigh wound/abscess.  She failed outpatient Keflex regimen.  She also had left ankle pain sustained after a mechanical fall at home.  Imaging revealed a fracture which required no surgical treatment per Orthopedics.    Today, patient is seen at the bedside.  She is WBAT in CAM boot. Pain is controlled with Tylenol and prn Tramadol.  Right leg chronic wound/possible abscess was treated/completed with a 7 day course of Clindamycin and UTI treated with a 5 day course Ceftriaxone.  Chronic anemia with las HgB 8.6. No active bleeding.     Past Medical History:   Diagnosis Date     Basal cell carcinoma 2000    forehead     Bunion      Cellulitis      Fatty liver      Hammer toe      Hypertension      Open wound(s) (multiple) of unspecified site(s), without mention of complication      Papilloma of breast      Past Surgical History:   Procedure Laterality Date     BREAST BIOPSY Left     benign nipple removed     bunion and hammertoe Right      CATARACT EXTRACTION       CLOSED REDUCTION HIP DISLOCATION Right 2020    Procedure: CLOSED REDUCTION, HIP;  Surgeon: Dariel Em MD;  Location: [facility] [place];  Service: Orthopedics     DILATION AND CURETTAGE OF UTERUS      x2     TOTAL ABDOMINAL HYSTERECTOMY W/ BILATERAL SALPINGOOPHORECTOMY  1980     TOTAL HIP ARTHROPLASTY Right      Family History   Problem Relation Age of Onset     No Medical Problems  Mother      Stroke Father      No Medical Problems Sister      No Medical Problems Daughter      No Medical Problems Maternal Grandmother      No Medical Problems Maternal Grandfather      No Medical Problems Paternal Grandmother      No Medical Problems Paternal Grandfather      No Medical Problems Maternal Aunt      No Medical Problems Paternal Aunt      BRCA 1/2 Neg Hx      Breast cancer Neg Hx      Cancer Neg Hx      Colon cancer Neg Hx      Endometrial cancer Neg Hx      Ovarian cancer Neg Hx      Social History     Socioeconomic History     Marital status:      Spouse name: Not on file     Number of children: Not on file     Years of education: Not on file     Highest education level: Not on file   Occupational History     Not on file   Social Needs     Financial resource strain: Not on file     Food insecurity     Worry: Not on file     Inability: Not on file     Transportation needs     Medical: Not on file     Non-medical: Not on file   Tobacco Use     Smoking status: Never Smoker     Smokeless tobacco: Never Used   Substance and Sexual Activity     Alcohol use: No     Drug use: Never     Sexual activity: Not on file   Lifestyle     Physical activity     Days per week: Not on file     Minutes per session: Not on file     Stress: Not on file   Relationships     Social connections     Talks on phone: Not on file     Gets together: Not on file     Attends Yazidism service: Not on file     Active member of club or organization: Not on file     Attends meetings of clubs or organizations: Not on file     Relationship status: Not on file     Intimate partner violence     Fear of current or ex partner: Not on file     Emotionally abused: Not on file     Physically abused: Not on file     Forced sexual activity: Not on file   Other Topics Concern     Not on file   Social History Narrative    Living at home alone.  and has 1 daughter in [place].      Allergies   Allergen Reactions     Penicillins Nausea  And Vomiting     Current Outpatient Medications   Medication Sig Dispense Refill     acetaminophen (TYLENOL) 325 MG tablet Take by mouth every 4 (four) hours as needed for pain.       ascorbic acid, vitamin C, (VITAMIN C) 500 MG tablet Take 500 mg by mouth daily.       aspirin 81 mg chewable tablet Chew 1 tablet (81 mg total) 2 (two) times a day.  0     atenolol (TENORMIN) 50 MG tablet Take 25 mg by mouth daily.        CALCIUM CARBONATE (CALCIUM 500 ORAL) Take 1 tablet by mouth daily.        ferrous sulfate 325 (65 FE) MG tablet Take 1 tablet by mouth daily with breakfast.       glucosamine sulfate 500 mg cap Take 500 mg by mouth daily.       hydrochlorothiazide (HYDRODIURIL) 25 MG tablet Take 25 mg by mouth daily.       lisinopriL (PRINIVIL,ZESTRIL) 10 MG tablet Take 1 tablet (10 mg total) by mouth daily. 30 tablet 0     menthol (BIOFREEZE, MENTHOL,) 4 % Gel Apply 1 application topically 3 (three) times a day as needed. (apply to knees and shoulders)       nystatin (MYCOSTATIN) powder Apply 1 application topically 2 (two) times a day as needed.        omega-3 fatty acids 1,000 mg cap Take 1,000 mg by mouth daily.        polyethylene glycol (MIRALAX) 17 gram packet Take 1 packet (17 g total) by mouth daily as needed (constipation).  0     potassium chloride (KLOR-CON) 10 MEQ CR tablet Take 10 mEq by mouth daily.       No current facility-administered medications for this visit.        REVIEW OF SYSTEMS:    Currently, no fever, chills, or rigors. Does not have any visual or hearing problems. Denies any chest pain, headaches, palpitations, lightheadedness, dizziness, shortness of breath, or cough. Appetite is good. Denies any GERD symptoms. Denies any difficulty with swallowing, nausea, or vomiting.  Denies any abdominal pain, diarrhea or constipation. Denies any urinary symptoms. No insomnia. No active bleeding. Abscess of right lower extremity.       PHYSICAL EXAMINATION:  Vitals:    04/20/20 1105   BP: 92/60    Pulse: 73   Resp: 18   Temp: 98.3  F (36.8  C)   SpO2: 96%   Weight: 168 lb 11.2 oz (76.5 kg)       GENERAL: Awake, Alert, oriented x3, not in any form of acute distress, answers questions appropriately, follows simple commands, conversant  HEENT: Head is normocephalic with normal hair distribution. No evidence of trauma. Ears: No acute purulent discharge. Eyes: Conjunctivae pink with no scleral jaundice. Nose: Normal mucosa and septum. NECK: Supple with no cervical or supraclavicular lymphadenopathy. Trachea is midline.   CHEST: No tenderness or deformity, no crepitus  LUNG: Clear to auscultation with good chest expansion. There are no crackles or wheezes, normal AP diameter.  BACK: No kyphosis of the thoracic spine. Symmetric, no curvature, ROM normal, no CVA tenderness, no spinal tenderness   CVS: There is good S1  S2, there are no murmurs, rubs, gallops, or heaves, rhythm is regular,  2+ pulses symmetric in all extremities.  ABDOMEN: Globular and soft, nontender to palpation, non distended, no masses, no organomegaly, good bowel sounds, no rebound or guarding, no peritoneal signs.   EXTREMITIES:  Limited range of motion of left lower extremity, there is no tenderness to palpation, no pedal edema, no cyanosis or clubbing, no calf tenderness.  Pulses equal in all extremities, normal cap refill, no joint swelling.  SKIN:   Abscess of right leg.  Pressure ulcer of coccygeal region, stage 3.  NEUROLOGICAL: The patient is oriented to person, place and time. Strength and sensation are grossly intact. Face is symmetric.    LABS:     Lab Results   Component Value Date    WBC 5.9 04/17/2020    HGB 10.0 (L) 04/27/2020    HCT 26.5 (L) 04/17/2020     (H) 04/17/2020     04/17/2020     Results for orders placed or performed in visit on 04/17/20   Basic Metabolic Panel   Result Value Ref Range    Sodium 133 (L) 136 - 145 mmol/L    Potassium 4.9 3.5 - 5.0 mmol/L    Chloride 102 98 - 107 mmol/L    CO2 22 22 - 31  mmol/L    Anion Gap, Calculation 9 5 - 18 mmol/L    Glucose 92 70 - 125 mg/dL    Calcium 8.7 8.5 - 10.5 mg/dL    BUN 42 (H) 8 - 28 mg/dL    Creatinine 1.01 0.60 - 1.10 mg/dL    GFR MDRD Af Amer >60 >60 mL/min/1.73m2    GFR MDRD Non Af Amer 52 (L) >60 mL/min/1.73m2     Lab Results   Component Value Date    HGBA1C 5.4 04/04/2020     No results found for: [id number]  Lab Results   Component Value Date    [id number] 482 04/23/2020       ASSESSMENT/PLAN:    1. Closed fracture of left ankle with routine healing, subsequent encounter - Evaluated by Ortho - no intervention at this time,  WBAT in CAM boot, pain controlled with  scheduled Tylenol and prn Tramadol.   2. Abscess of right leg - Completed IV Clindamycin and Ceftriaxone due to high CRP. She completed 7 more days of oral Clindamycin   3. Sebaceous cyst - Will continue to monoitor   4. Anemia due to stage 3 chronic kidney disease (H) -  Last Hgb 8.5, will continue Ferrous sulfate and Ascorbic acid   5. Pressure ulcer of coccygeal region, stage 3 (H)  - Seen by WOC team in the hospital, will follow those treatment orders and adjust as needed.             Electronically signed by:  Edward Calixto CNP    Total time spent on the unit was 40 minutes of which 25 minutes was spent in counseling Abscess/Antibiotic & Probiotic therapy, Safety precautions, Anemia/Labs and coordination of care of the above plan with nursing staff, patient, and therapy.

## 2021-06-07 NOTE — PROGRESS NOTES
[facility]/ Geriatrics    Facility:  [facility] [[id number]]    Code Status:  DNR    Chief Complaint   Patient presents with     Review Of Multiple Medical Conditions   : Diarrhea, pain control                  Patient Active Problem List   Diagnosis     Accelerated essential hypertension     Basal cell carcinoma     Fatty liver     Bunion     Hammer toe     Papilloma of breast     Cellulitis     Open wound(s) (multiple) of unspecified site(s), without mention of complication     Leucocytoclastic vasculitis (H)     Knee osteoarthritis     Impingement syndrome of right shoulder     Posterior dislocation of right hip, initial encounter (H)     Traumatic rhabdomyolysis, subsequent encounter     Status post total replacement of right hip     Acute blood loss as cause of postoperative anemia     Pressure injury of sacral region, stage 3 (H)     Generalized weakness     Fall against object     Electrolyte disturbance     Closed fibular fracture       History:  Mitzi De Santiago  is aan 85 year old female with history of CKD 3, chronic anemia, mild cognitive impairment, hypertension, osteoarthritis, stage III coccyx wound     Hospital Course:    1.  Hospitalized early January 2020 following a slip and fall at home resulting in dislocation of her prosthetic hip.  It could not be reduced in the emergency room and she needed to go to the OR for anesthetic reduction.  She did not require open surgery and there were no other fractures noted.  She was placed in an abductor brace.  Hospitalization was remarkable hypertensive crisis attributed to pain.  She also suffered acute blood loss anemia with hemoglobin down to 7.5.  She did receive 2 units PRBCs with hemoglobin back up to 9.6.  She experienced mild rhabdomyolysis with a CPK of 2927.  Her coccyx wound was discovered and treated as well.                 Patient was discharged to [facility] where she stayed between January 8 and March 11.  Towards the  end of her stay she developed a red distal thigh there was concern about infection versus DVT.  Doppler study was negative.  She was placed on Keflex and was still on it at the time of discharge.  She also experienced an E. coli UTI and was treated with Septra without TCU stay.  TCU recommended 24-hour care for safety, it was not clear that the family could provide that, vulnerable adult report was filed by .                 Patient had home services involved following her discharge from the TCU.  After couple days at home she noted that she suddenly had a lot of liquid on her close in the area of the distal right thigh swelling.  She has since had an open draining wound.     2.  Patient was admitted between April 3 and April 10 following another fall at home.  She describes being in her wheelchair going to the bathroom and when she was transferring back to her wheelchair she fell and injured her leg.  She pushed her medical alert button and help arrived.              Evaluation revealed a distal fibular fracture treated with Cam walker.  Weightbearing as tolerated.  She was seen by Ortho.  Hospitalization also remarkable for stable anemia 8.6-8.8.  She had another Doppler of her leg which again was negative for DVT.  She had a negative chest x-ray.  She had significant elevation of her CRP and the team at the hospital felt that this likely represented cellulitis/infection/possible ruptured/infected hematoma.  She was treated with Rocephin plus clindamycin, discharged on clindamycin which continues through April 8.  It does not appear wound culture was obtained.  There was no growth from blood culture.      Subjective/ROS:    -augmented by discussion with facility staff involved in direct care    -As notified by the staff that patient is having pain at times.  I talked to her and she says that her pain is actually well controlled.     Biofreeze added to shoulders and knees on April 23.  Additionally  she is taking Tylenol thousand milligrams 3 times daily and Ultram continues 50 mg every 6 hours as needed.  She says it is her chronic left carpal tunnel pain and numbness that bothers her more than anything and that is not new.  She was too ill to have carpal tunnel release and has developed some muscle weakness over time is resolved.    -Patient's main concern is for loose stool.  2 to 3 days ago her stools became loose and have remained so.  However she is only having 2 stools a day generally in the morning.  She is not waking up at night with loose stool.  She is not had abdominal pain.  There is been no blood or melena.  She of course has been on antibiotics for her right thigh hematoma/infection.    - Staff reports that her sacral wound is clean and dry.    Patient reports no fever sweats or chills.  She has had no vomiting.  Reports good appetite.  She is not feeling any dysuria.  Her generalized weakness is slowly improving.      Past Medical History:   Diagnosis Date     Basal cell carcinoma 9/2000    forehead     Bunion      Cellulitis      Fatty liver      Hammer toe      Hypertension      Open wound(s) (multiple) of unspecified site(s), without mention of complication      Papilloma of breast      Past Surgical History:   Procedure Laterality Date     BREAST BIOPSY Left     benign nipple removed     bunion and hammertoe Right 1992     CATARACT EXTRACTION  1994     CLOSED REDUCTION HIP DISLOCATION Right 1/4/2020    Procedure: CLOSED REDUCTION, HIP;  Surgeon: Dariel Em MD;  Location: [facility];  Service: Orthopedics     DILATION AND CURETTAGE OF UTERUS      x2     TOTAL ABDOMINAL HYSTERECTOMY W/ BILATERAL SALPINGOOPHORECTOMY  1980     TOTAL HIP ARTHROPLASTY Right 2009          Family History   Problem Relation Age of Onset     No Medical Problems Mother      Stroke Father      No Medical Problems Sister      No Medical Problems Daughter      No Medical Problems Maternal Grandmother       No Medical Problems Maternal Grandfather      No Medical Problems Paternal Grandmother      No Medical Problems Paternal Grandfather      No Medical Problems Maternal Aunt      No Medical Problems Paternal Aunt      BRCA 1/2 Neg Hx      Breast cancer Neg Hx      Cancer Neg Hx      Colon cancer Neg Hx      Endometrial cancer Neg Hx      Ovarian cancer Neg Hx    :       Social History     Socioeconomic History     Marital status:      Spouse name: Not on file     Number of children: Not on file     Years of education: Not on file     Highest education level: Not on file   Occupational History     Not on file   Social Needs     Financial resource strain: Not on file     Food insecurity     Worry: Not on file     Inability: Not on file     Transportation needs     Medical: Not on file     Non-medical: Not on file   Tobacco Use     Smoking status: Never Smoker     Smokeless tobacco: Never Used   Substance and Sexual Activity     Alcohol use: No     Drug use: Never     Sexual activity: Not on file   Lifestyle     Physical activity     Days per week: Not on file     Minutes per session: Not on file     Stress: Not on file   Relationships     Social connections     Talks on phone: Not on file     Gets together: Not on file     Attends [employer] service: Not on file     Active member of club or organization: Not on file     Attends meetings of clubs or organizations: Not on file     Relationship status: Not on file     Intimate partner violence     Fear of current or ex partner: Not on file     Emotionally abused: Not on file     Physically abused: Not on file     Forced sexual activity: Not on file   Other Topics Concern     Not on file   Social History Narrative    Living at home alone.  and has 1 daughter in [place].    :        Current Outpatient Medications on File Prior to Visit   Medication Sig Dispense Refill     acetaminophen (TYLENOL) 500 MG tablet Take 2 tablets (1,000 mg total) by mouth 3 (three)  times a day.  0     ascorbic acid, vitamin C, (VITAMIN C) 500 MG tablet Take 500 mg by mouth daily.       aspirin 81 mg chewable tablet Chew 1 tablet (81 mg total) 2 (two) times a day.  0     atenolol (TENORMIN) 50 MG tablet Take 50 mg by mouth daily.       CALCIUM CARBONATE (CALCIUM 500 ORAL) Take 1 tablet by mouth daily.        ferrous sulfate 325 (65 FE) MG tablet Take 1 tablet by mouth daily with breakfast.       glucosamine sulfate 500 mg cap Take 500 mg by mouth daily.       hydrochlorothiazide (HYDRODIURIL) 25 MG tablet Take 25 mg by mouth daily.       lisinopriL (PRINIVIL,ZESTRIL) 10 MG tablet Take 1 tablet (10 mg total) by mouth daily. 30 tablet 0     magnesium oxide 250 mg magnesium Tab Take 250 mg by mouth daily.       nystatin (MYCOSTATIN) powder Apply 1 application topically 2 (two) times a day as needed.        polyethylene glycol (MIRALAX) 17 gram packet Take 1 packet (17 g total) by mouth daily as needed (constipation).  0     potassium chloride (KLOR-CON) 10 MEQ CR tablet Take 10 mEq by mouth daily.       traMADoL (ULTRAM) 50 mg tablet Take 1 tablet (50 mg total) by mouth every 6 (six) hours as needed. 10 tablet 0     menthol (BIOFREEZE, MENTHOL,) 4 % Gel Apply 1 application topically 3 (three) times a day as needed. (apply to knees and shoulders)       omega-3 fatty acids 1,000 mg cap Take 1,000 mg by mouth daily.        No current facility-administered medications on file prior to visit.    :      ALLERGIES:  Penicillins    Vitals:  There were no vitals taken for this visit. except as noted below    Vital signs: Reviewed per facility EMR vitals including as follows:                Current Vitals   BP: 131/68 mmHg  4/24/2020 22:40  Temp:98.9  F  4/24/2020 22:40  Pulse: 66 bpm  4/24/2020 22:40  Weight: 163.5 Lbs  4/14/2020 16:01  Resp: 16 Breaths/min  4/24/2020 22:40  BS:  O2: 96 %  4/24/2020 22:40  Pain: 0  There is no height or weight on file to calculate BMI.    Physical exam:    General:  Alert,  pleasant and conversant appears no apparent distress    HEENT:  NC/AT, sclera clear, EOMI gaze is conjugate  Nonlabored breathing without tachypnea.  She is moving all extremities.  Wounds were not examined personally today.  However she did show me her ankles which appear nonedematous.          Due to the 2020 Covid 19 pandemic, except as noted above, the patient was visually observed at a 6 foot plus distance.  An observational exam was performed in an effort to keep patient safe from Covid 19 and other communicable diseases.   Labs:  Lab Results   Component Value Date    WBC 5.9 04/17/2020    HGB 8.5 (L) 04/17/2020    HCT 26.5 (L) 04/17/2020     (H) 04/17/2020     04/17/2020     Results for orders placed or performed in visit on 04/17/20   Basic Metabolic Panel   Result Value Ref Range    Sodium 133 (L) 136 - 145 mmol/L    Potassium 4.9 3.5 - 5.0 mmol/L    Chloride 102 98 - 107 mmol/L    CO2 22 22 - 31 mmol/L    Anion Gap, Calculation 9 5 - 18 mmol/L    Glucose 92 70 - 125 mg/dL    Calcium 8.7 8.5 - 10.5 mg/dL    BUN 42 (H) 8 - 28 mg/dL    Creatinine 1.01 0.60 - 1.10 mg/dL    GFR MDRD Af Amer >60 >60 mL/min/1.73m2    GFR MDRD Non Af Amer 52 (L) >60 mL/min/1.73m2         Lab Results   Component Value Date    TSH 1.82 04/23/2020     Lab Results   Component Value Date    HGBA1C 5.4 04/04/2020     [unfilled]  Lab Results   Component Value Date    VQEWLZZC22 482 04/23/2020     No results found for: BNP  [unfilled]  Most Recent EKG     Units 04/03/20  1928   VENTRATE BPM 84   ATRIALRATE BPM 84   QRSDURATION ms 140   QTINTERVAL ms 394   QTCCALC ms 465   P [place] degrees 40   RAXIS degrees -25   TAXIS degrees -17   MUSEDX  Sinus rhythm with 1st degree A-V block  Right bundle branch block  Abnormal ECG  When compared with ECG of 04-JAN-2020 17:23,  Aberrant conduction is no longer Present  QT has shortened  Confirmed by SEE ED PROVIDER NOTE FOR, ECG INTERPRETATION ([id number]),  SERGE DON ([id number]) on  4/4/2020 1:39:05 AM         Results for HAYDER BARBOZA (MRN [id number]) as of 4/26/2020 21:05   Ref. Range 4/23/2020 06:25   Sodium Latest Ref Range: 136 - 145 mmol/L 134 (L)   Magnesium Latest Ref Range: 1.8 - 2.6 mg/dL 2.1   Iron Latest Ref Range: 42 - 175 ug/dL 81   Ferritin Latest Ref Range: 10 - 130 ng/mL 335 (H)   Folate Latest Ref Range: >=3.5 ng/mL 15.5   Vitamin B-12 Latest Ref Range: 213 - 816 pg/mL 482   Transferrin Latest Ref Range: 212 - 360 mg/dL 193 (L)   Transferrin IBC, Calculated Latest Ref Range: 313 - 563 ug/dL 241 (L)   Transferrin Saturation, Calculated Latest Ref Range: 20 - 50 % 34   TSH Latest Ref Range: 0.30 - 5.00 uIU/mL 1.82     Assessment/Plan:    1. Open wound(s) (multiple) of unspecified site(s), without mention of complication  T14.8XXA     2. Electrolyte disturbance  E87.8     3. Cellulitis of right lower extremity  L03.115     4. Pressure injury of sacral region, stage 3 (H)  L89.153          Diarrhea    Mild symptoms so far.  She has now completed her antibiotics and this is likely antibiotic induced.  At this low frequency without associated symptoms I do not believe C. difficile PCR testing is warranted at the moment but close observation is necessary.  If she fails to improve as anticipated over the next couple of days likely 7 C. difficile as above.    multiple falls  January 2020 prosthetic hip dislocation  April 2020 fibular fracture              Patient reports that she is satisfied with her pain options and is not taking Ultram at her allowed frequency.  We will continue as current including scheduled acetaminophen and new topicals.  Continue with PT, OT, .  Wean Ultram when able. She is on glucosamine for osteoarthritis as well.     Stage 3 sacral wound              I did not examine today.  The staff reports a clean base.  Continue wound care orders.     Right distal thigh wound              Questionable infected hematoma as etiology.  This seems like the  most plausible explanation.  May have had blood gather distally from the original hip dislocation as she did have significant blood loss anemia at that point.  She does describe a large lump without other signs of infection before rupture.  No cultures have been done so is not clear to me that it was ever truly infected.    She completed her clindamycin April 18.  Current wound care      Electrolyte disturbance              Hyponatremia, now at 134 as noted above.  Most likely related to hydrochlorothiazide.    Magnesium has recovered.     Acute blood loss anemia  Chronic macrocytic anemia              Patient did have B12 level done in 2019.  She had TSH done in January 2020.  The cause of her macrocytosis is unknown.  I do not see that she has had a peripheral smear.  Her RDW is elevated at 14.9.  Her white blood count and platelet lines are normal.  -B12 482, TSH 0.08 ferritin 335 iron 81 transferrin mildly low 193  - Anticipate she will need further outpatient work-up with reticulocyte count, peripheral smear.  May need hematology involvement depending on those results.  If her TCU stay is prolonged for any reason, we could proceed with further work-up here but I think it makes more sense to reunite with her [facility] doctor at this point     Hypertension  Hypertensive crisis in January              Patient is on lisinopril hydrochlorothiazide atenolol.  She also takes potassium.    Blood pressures remain acceptable.      CKD 3              Noted, monitor as above avoid nephrotoxins were able creatinine 1.01      Case discussed with:    Facility staff       Mor Flores MD

## 2021-06-07 NOTE — TELEPHONE ENCOUNTER
[facility] Care for [facility]iors Nurse Triage Telephone Note      Provider: TRE Francois  Facility: [facility]    Facility Type: TCU    Caller: Desmond  Call Back Number:  [phone]    Allergies: Penicillins    Reason for call: Nurse requesting an order for Biofreeze to knees and shoulders PRN.       Verbal Order/Direction given by Provider: Biofreeze to knees and shoulders three times a day PRN.      Provider giving order: TRE Francois    Verbal order given to: Joaquim Sainz RN

## 2021-06-08 NOTE — PROGRESS NOTES
Code Status:  DNR  Visit Type: Discharge Summary     Facility:  [facility] [[id number]]          PCP:  Jerry Araujo MD  [phone]       Admission Date to our Facility: 5/10/2020  discharge Date from our Facility: 5/27/2020    Discharge Diagnosis:    1. Closed fracture of distal end of left fibula with routine healing, unspecified fracture morphology, subsequent encounter     2. Pain management     3. Pressure ulcer of coccygeal region, stage 3 (H)     4. Open wound(s) (multiple) of unspecified site(s), without mention of complication     5. Essential hypertension     6. Weight loss          History of Present Illness: Mitzi De Santiago is a 85 y.o. female with a past medical history for CKD, chronic anemia, mild cognitive impairment, hypertension, osteoarthritis, multiple chronic wounds. She had been hospitalized in January 2020 in which she also had a fall resulting in dislocation of her prosthetic hip and was discharged home from TCU.  She did have increased right distal thigh swelling and drainage from her incision.  She has since had an open draining wound.    She did sustain a fall at home and was readmitted to the hospital May 3 for distal fibula fracture. She was taken to the OR for closed reduction.   She was evaluated by orthopedics and they recommended weightbearing as tolerated with a cam boot.  Her pain was controlled with Tylenol and tramadol.  Her hemoglobin was stable at 8.6.  She did have significant elevation of her CRP and it was felt that this was related to cellulitis/infection/possible ruptured or infected hematoma.  She was treated with IV Rocephin and then discharged on clindamycin through 5/8. Doppler of right leg was negative for DVT.  She also was treated with ceftriaxone for 5 days for an E. coli UTI.    Skilled Nursing Facility Course:    While at the TCU she did have issues with persistent diarrhea however tested negative for C. difficile.  She was taking arginaid for wound  healing however she started to refuse this feeling this had caused her loose stools.  After stopping the arginaid, she did show great improvement in her stooling.    Her left lower extremity did improve and orthopedics DC'd cam boot and weight restrictions.  She does wear an ankle support when up.  Pain is well managed and all pain medications have been discontinued.  She may likely need some compression in the future to improve edema however edema is visibly stable at this time.    During her stay she did have issues with bradycardia however was asymptomatic.  She is on atenolol for hypertension along with hydrochlorothiazide and lisinopril.  I did decrease her atenolol to 25 mg daily and her blood pressures maintain stability and her heart rate is now in good range.    She did have some weight loss during her stay however this seems to be rebounding as she has gained 4 pounds from a couple weeks ago.    Discharge Medications:   Current Outpatient Medications   Medication Sig Dispense Refill     acetaminophen (TYLENOL) 325 MG tablet Take by mouth every 4 (four) hours as needed for pain.       ascorbic acid, vitamin C, (VITAMIN C) 500 MG tablet Take 500 mg by mouth daily.       aspirin 81 mg chewable tablet Chew 1 tablet (81 mg total) 2 (two) times a day.  0     atenolol (TENORMIN) 50 MG tablet Take 25 mg by mouth daily.        CALCIUM CARBONATE (CALCIUM 500 ORAL) Take 1 tablet by mouth daily.        ferrous sulfate 325 (65 FE) MG tablet Take 1 tablet by mouth daily with breakfast.       glucosamine sulfate 500 mg cap Take 500 mg by mouth daily.       hydrochlorothiazide (HYDRODIURIL) 25 MG tablet Take 25 mg by mouth daily.       lisinopriL (PRINIVIL,ZESTRIL) 10 MG tablet Take 1 tablet (10 mg total) by mouth daily. 30 tablet 0     menthol (BIOFREEZE, MENTHOL,) 4 % Gel Apply 1 application topically 3 (three) times a day as needed. (apply to knees and shoulders)       nystatin (MYCOSTATIN) powder Apply 1 application  topically 2 (two) times a day as needed.        omega-3 fatty acids 1,000 mg cap Take 1,000 mg by mouth daily.        polyethylene glycol (MIRALAX) 17 gram packet Take 1 packet (17 g total) by mouth daily as needed (constipation).  0     potassium chloride (KLOR-CON) 10 MEQ CR tablet Take 10 mEq by mouth daily.       No current facility-administered medications for this visit.        For most current and accurate medication list, please contact the skilled nursing facility that this patient visit took place at.      Discharge Plan: Patient is stable to discharge to her new assisted living facility.  She will need to follow-up with her primary provider regarding maintenance and ongoing care.    Review of Systems   Patient denies fever, chills, headache, lightheadedness, dizziness, rhinorrhea, cough, congestion, shortness of breath, chest pain, palpitations, abdominal pain, n/v, diarrhea, constipation, change in appetite, dysuria, frequency, burning or pain with urination.  Other than stated in HPI all other review of systems is negative.         Physical Exam   Complete physical exam was not completed in order to maintain social distancing during the COVID crisis.    Head is normocephalic and atraumatic, EOM intact.  Respiratory effort is normal, no visible edema.  Has wounds on her coccyx and right inner thigh that has current wound care.  Mood is stable.    Vitals: /66, heart rate 65, respirations 16, temp 98.4.    Labs:  No results found for this or any previous visit (from the past 240 hour(s)).      Assessment:  1. Closed fracture of distal end of left fibula with routine healing, unspecified fracture morphology, subsequent encounter     2. Pain management     3. Pressure ulcer of coccygeal region, stage 3 (H)     4. Open wound(s) (multiple) of unspecified site(s), without mention of complication     5. Essential hypertension     6. Weight loss         MEDICAL EQUIPMENT NEEDS:  NA    DISCHARGE PLAN/FACE TO  FACE:  I certify that services are/were furnished while this patient was under the care of a physician and that a physician or an allowed non-physician practitioner (NPP), had a face-to-face encounter that meets the physician face-to-face encounter requirements. The encounter was in whole, or in part, related to the primary reason for home health. The patient is confined to his/her home and needs intermittent skilled nursing, physical therapy, speech-language pathology, or the continued need for occupational therapy. A plan of care has been established by a physician and is periodically reviewed by a physician.    I certify that this patient is under my care and that I, or a nurse practitioner or physician's assistant working with me, had a face-to-face encounter that meets the physician face-to-face encounter requirements with this patient.   Date of Face-to-Face Encounter: 5/26/2020    I certify that, based on my findings, the following services are medically necessary home health services: RN, PT/OT    My clinical findings support the need for the above skilled services because: RN for wound care and medication management, PT/OT for ongoing endurance and strengthening training.    This patient is homebound because: Patient requires maximum effort in order to get out into the community on a regular basis.    The patient is, or has been, under my care and I have initiated the establishment of the plan of care. This patient will be followed by a physician who will periodically review the plan of care.      Electronically signed by: Danelle Pandey CNP
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
## 2021-06-16 PROBLEM — S82.409A CLOSED FIBULAR FRACTURE: Status: ACTIVE | Noted: 2020-04-24

## 2021-06-16 PROBLEM — S73.014A: Chronic | Status: ACTIVE | Noted: 2020-01-04

## 2021-06-16 PROBLEM — D62 ACUTE BLOOD LOSS AS CAUSE OF POSTOPERATIVE ANEMIA: Chronic | Status: ACTIVE | Noted: 2020-01-05

## 2021-06-16 PROBLEM — T79.6XXD TRAUMATIC RHABDOMYOLYSIS, SUBSEQUENT ENCOUNTER: Chronic | Status: ACTIVE | Noted: 2020-01-05

## 2021-06-16 PROBLEM — Z96.641 STATUS POST TOTAL REPLACEMENT OF RIGHT HIP: Chronic | Status: ACTIVE | Noted: 2020-01-05

## 2021-06-16 PROBLEM — E87.8 ELECTROLYTE DISTURBANCE: Status: ACTIVE | Noted: 2020-04-17

## 2021-06-16 PROBLEM — R53.1 GENERALIZED WEAKNESS: Status: ACTIVE | Noted: 2020-04-03

## 2021-06-20 NOTE — LETTER
Letter by Nazanin Gonsalves CNP at      Author: Nazanin Gonsalves CNP Service: -- Author Type: --    Filed:  Encounter Date: 2/13/2020 Status: (Other)         Patient: Mitzi De Santiago   MR Number: [id number]   YOB: 1934   Date of Visit: 2/13/2020     [facility]    Facility:   [facility] [[id number]]   Code Status: FULL CODE      CHIEF COMPLAINT/REASON FOR VISIT:  Chief Complaint   Patient presents with   ? Problem Visit     bruise       HISTORY:      HPI: Mitzi is a 85 y.o. female undergoing physical and occupational therapy at [facility] transitional care unit.  She is with past medical history of hypertension, hematoma, chronic kidney disease stage II-III and osteoarthritis. Today[facility] PT OT therapy to evaluate for lymph wraps who presented to the emergency department for evaluation of right hip pain after mechanical fall.  She underwent a reduction under anesthesia.  She is with a history of a total right hip arthroplasty the femoral prosthesis is dislocated superiorly and no fracture.  She also had a hypertension crisis during her hospitalization per hospital report probably secondary to pain she did receive IV hydralazine.    Today she is seen for a bruise right leg.  Patient seen in her room and noted to have a bruise 6 cm x 7 cm on her right inner thigh.  She does not recall how she got the bruise.  She does report the only thing she can think of is she was using a ball in therapy between her legs and pressing her legs together.  She denies any pain with the bruising and it is beginning to fade in color.  She denies chest pain or shortness of breath.  She is moving her bowels and has  no issues with urination.  She is alert and oriented x4.   She denies pain  She is no longer wearing the abductor brace.  She is standing in therapy.  Her CK is WNL at  48.    Denies any congestion or cough.     Past Medical History:   Diagnosis Date   ?  Basal cell carcinoma 9/2000    forehead   ? Bunion    ? Cellulitis    ? Fatty liver    ? Hammer toe    ? Hypertension    ? Open wound(s) (multiple) of unspecified site(s), without mention of complication    ? Papilloma of breast              Family History   Problem Relation Age of Onset   ? No Medical Problems Mother    ? Stroke Father    ? No Medical Problems Sister    ? No Medical Problems Daughter    ? No Medical Problems Maternal Grandmother    ? No Medical Problems Maternal Grandfather    ? No Medical Problems Paternal Grandmother    ? No Medical Problems Paternal Grandfather    ? No Medical Problems Maternal Aunt    ? No Medical Problems Paternal Aunt    ? BRCA 1/2 Neg Hx    ? Breast cancer Neg Hx    ? Cancer Neg Hx    ? Colon cancer Neg Hx    ? Endometrial cancer Neg Hx    ? Ovarian cancer Neg Hx      Social History     Socioeconomic History   ? Marital status:      Spouse name: Not on file   ? Number of children: Not on file   ? Years of education: Not on file   ? Highest education level: Not on file   Occupational History   ? Not on file   Social Needs   ? Financial resource strain: Not on file   ? Food insecurity:     Worry: Not on file     Inability: Not on file   ? Transportation needs:     Medical: Not on file     Non-medical: Not on file   Tobacco Use   ? Smoking status: Never Smoker   ? Smokeless tobacco: Never Used   Substance and Sexual Activity   ? Alcohol use: No   ? Drug use: Never   ? Sexual activity: Not on file   Lifestyle   ? Physical activity:     Days per week: Not on file     Minutes per session: Not on file   ? Stress: Not on file   Relationships   ? Social connections:     Talks on phone: Not on file     Gets together: Not on file     Attends Oriental orthodox service: Not on file     Active member of club or organization: Not on file     Attends meetings of clubs or organizations: Not on file     Relationship status: Not on file   ? Intimate partner violence:     Fear of current or ex  partner: Not on file     Emotionally abused: Not on file     Physically abused: Not on file     Forced sexual activity: Not on file   Other Topics Concern   ? Not on file   Social History Narrative    Living at home alone.  and has 1 daughter in [place].          Review of Systems   Constitutional: Positive for activity change and fatigue. Negative for appetite change and fever.   HENT: Negative for congestion.    Respiratory: Negative for cough, shortness of breath and wheezing.    Cardiovascular: Negative for chest pain and leg swelling.   Gastrointestinal: Negative for abdominal distention, abdominal pain, constipation, diarrhea and nausea.   Genitourinary: Negative for dysuria.   Musculoskeletal: Positive for arthralgias. Negative for back pain.   Skin: Positive for wound. Negative for color change.   Neurological: Negative for dizziness.   Psychiatric/Behavioral: Negative for agitation, behavioral problems and confusion.       Vitals:    02/13/20 1006   BP: 152/63   Pulse: (!) 58   Resp: 18   Temp: 99  F (37.2  C)   SpO2: 98%   Weight: 160 lb (72.6 kg)       Physical Exam  Constitutional:       Appearance: She is well-developed.      Comments: Pleasant woman in no acute distress   HENT:      Head: Normocephalic.   Eyes:      Conjunctiva/sclera: Conjunctivae normal.   Neck:      Musculoskeletal: Normal range of motion.   Cardiovascular:      Rate and Rhythm: Normal rate and regular rhythm.      Heart sounds: Normal heart sounds. No murmur.   Pulmonary:      Effort: No respiratory distress.      Breath sounds: Normal breath sounds. No wheezing or rales.   Abdominal:      General: Bowel sounds are normal. There is no distension.      Palpations: Abdomen is soft.      Tenderness: There is no abdominal tenderness.   Musculoskeletal: Normal range of motion.      Comments:   Decreased range of motion left upper extremity she reports it has been this way for years.   Skin:     General: Skin is warm.      Comments:  Coccyx wound being followed by the wound care team healing well per staff.      Neurological:      Mental Status: She is alert and oriented to person, place, and time.   Psychiatric:         Behavior: Behavior normal.           LABS:   No results found for this or any previous visit (from the past 240 hour(s)).    ASSESSMENT:      ICD-10-CM    1. Bruise T14.8XXA        PLAN:    Bruise-right inner thigh measuring 6 cm x 7 cm patient unaware of how she got the bruise.  Denies any pain related to the bruise there was no hematoma noted     hypertension continue atenolol and hydrochlorothiazide blood pressure stable    Coccyx wound continue dressing changes she is being followed by the wound care team    Pain management patient reports pain controlled on scheduled Tylenol    Anticoagulation currently on 81 mg twice daily    Reduction of a right dislocated hip patient underwent reduction in the OR under anesthesia failed attempt in the ER, She no longer needs to wear the abductor brace.     [facility] PT OT    Rhabdomyolysis-elevated CK on 1/6/2020 and now within normal limits at 48    Electronically signed by: Nazanin Gonsalves CNP

## 2021-06-20 NOTE — LETTER
Letter by Danelle Pandey CNP at      Author: Danelle Pandey CNP Service: -- Author Type: --    Filed:  Encounter Date: 5/7/2020 Status: (Other)         Patient: Mitzi De Santiago   MR Number: [id number]   YOB: 1934   Date of Visit: 5/7/2020     Code Status:  DNR  Visit Type: Problem Visit (HTN over night, weight loss)     Facility:  [facility] [[id number]]        Facility Type: SNF (Skilled Nursing Facility, TCU)    History of Present Illness: Mitzi De Santiago is a 85 y.o. female with a past medical history for CKD, chronic anemia, mild cognitive impairment, hypertension, osteoarthritis, multiple chronic wounds.  She was recently hospitalized 5/3/2020 to 5/10/2020 after a fall at home.  She had been hospitalized in January 2020 in which she also had a fall resulting in dislocation of her prosthetic hip.  She was taken to the OR for closed reduction.  She did have a hypertensive crisis likely related to pain and anemia with a hemoglobin at 7.5.  She was given PRBCs.  It was also discovered that she had a coccyx wound that was treated.  She then was discharged to [facility]CU in in January in which she developed a red distal thigh and DVT was ruled out by Doppler.  She was placed on Keflex considering likely cellulitis.  She was then discharged to home against TCU's recommendations as they felt she needed 24-hour care and family could not provide this.  During the most recent hospitalization she was found to have a left distal fibular fracture.  She was evaluated by orthopedics and they recommended weightbearing as tolerated with a cam boot.  Her pain was controlled with Tylenol and tramadol.  Her hemoglobin was stable at 8.6.  She also was found to have a right thigh wound with possible abscess.  Blood cultures were negative and the wound was treated and she was placed on clindamycin.  Doppler of right leg was negative for DVT.  She also was treated with ceftriaxone for 5 days for an E. coli  UTI.    Today, I note that she had a blood pressure check in the middle of the night last night and it resulted as 171/65.  Her heart rate range has shown improvement with a reduction in atenolol, being 61-65.  It appears that her atenolol is still being held a couple of days due to her heart rate being less than 65.  Her recheck blood pressure this morning was 115/59 and her heart rate was 73.  She denies any symptoms of dizziness, headaches or palpitations.  Her weight this week is down again 249 pounds but this could likely be related to the significant diarrhea she had over the past 2 weeks.  She reports that her appetite continues to be good and she has been refusing arginate and nutritional supplement as she feels this was causing her diarrhea.  She reports that her stools have now stiffened up and she is going normally.  She denies any pain or discomfort or calf tenderness.  Her left lower extremity has good pulses however does have soft nonpitting edema.    Review of Systems   Patient denies fever, chills, headache, lightheadedness, dizziness, rhinorrhea, cough, congestion, shortness of breath, chest pain, palpitations, abdominal pain, n/v, diarrhea, constipation, change in appetite, dysuria, frequency, burning or pain with urination.  Other than stated in HPI all other review of systems is negative.           Physical Exam   Vital signs: /59, heart rate 73, respirations 16, temp 98.2.  GENERAL APPEARANCE: Well developed, well nourished, in no acute distress.  HEENT: normocephalic, atraumatic, sclerae anicteric, conjunctivae clear and moist, EOM intact  LUNGS: respiratory effort normal.  CARD: Heart tones were not auscultated in order to maintain social distancing during the COVID crisis.  EXTREMITIES: Soft nonpitting edema left greater than right mostly on the feet.  No calf tenderness  NEURO: Alert and oriented x 3.Face is symmetric.  SKIN: nursing reports the wounds are improving with current wound  gill.   PSYCH: euthymic          Labs:    No results found for this or any previous visit (from the past 240 hour(s)).      Assessment:  1. Bradycardia     2. Essential hypertension     3. Loose stools     4. Closed fracture of distal end of left fibula with routine healing, unspecified fracture morphology, subsequent encounter     5. Weight loss         Plan:   Bradycardia: Showing improvement after decreasing atenolol.  We will continue with atenolol at 25 mg daily.  Could consider discontinuing this altogether in the future and likely increasing her lisinopril if her blood pressures go up.  We will continue to monitor at this time.    Hypertension: Elevated blood pressure was likely incidental during a bowel movement and so we will continue to monitor.  Continue lisinopril, hydrochlorothiazide and atenolol.    Loose stools: Resolved    Fracture of left fibula: Stable ambulating well with no pain.  Could consider some compression in the future does wear an ankle wrap right at this point.    Weight loss: Likely is related to diarrhea over the past couple weeks however will have dietitian do calorie counts and assure that she has adequate nutrition as she is refusing to take any nutritional supplements at this time.          Electronically signed by: Danelle Pandey, CNP

## 2021-06-20 NOTE — LETTER
Letter by Danelle Pandey CNP at      Author: Danelle Pandey CNP Service: -- Author Type: --    Filed:  Encounter Date: 4/30/2020 Status: (Other)         Patient: Mitzi De Santiago   MR Number: [id number]   YOB: 1934   Date of Visit: 4/30/2020     Code Status:  DNR  Visit Type: Follow Up (Diarrhea, pain management, wounds.)     Facility:  [facility] [[id number]]        Facility Type: SNF (Skilled Nursing Facility, TCU)    History of Present Illness: Mitzi De Santiago is a 85 y.o. female with a past medical history for CKD, chronic anemia, mild cognitive impairment, hypertension, osteoarthritis, multiple chronic wounds.  She was recently hospitalized 5/3/2020 to 5/10/2020 after a fall at home.  She had been hospitalized in January 2020 in which she also had a fall resulting in dislocation of her prosthetic hip.  She was taken to the OR for closed reduction.  She did have a hypertensive crisis likely related to pain and anemia with a hemoglobin at 7.5.  She was given PRBCs.  It was also discovered that she had a coccyx wound that was treated.  She then was discharged to [facility] in in January in which she developed a red distal thigh and DVT was ruled out by Doppler.  She was placed on Keflex considering likely cellulitis.  She was then discharged to home against TCU's recommendations as they felt she needed 24-hour care and family could not provide this.  During the most recent hospitalization she was found to have a left distal fibular fracture.  She was evaluated by orthopedics and they recommended weightbearing as tolerated with a cam boot.  Her pain was controlled with Tylenol and tramadol.  Her hemoglobin was stable at 8.6.  She also was found to have a right thigh wound with possible abscess.  Blood cultures were negative and the wound was treated and she was placed on clindamycin.  Doppler of right leg was negative for DVT.  She also was treated with ceftriaxone for 5 days for an E.  coli UTI.    Today, she reports feeling well and denies any pain.  Orthopedics has allowed her to remove the cam boot.  She is questioning why she needs to continue to take Tylenol on a scheduled basis.  Her right lower extremity has an open shallow ulcer on her calf that is approximately 1 cm x 1 cm.  This is being treated by nursing with a foam dressing.  She also has a tunneled wound on her right inner thigh that is being packed.  Nursing reports that her wounds are improving.  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  Reports that her coccyx wound has been going on for a long time and she has been getting home care nursing at home to address that.  Her weight on admission was 163 pounds and yesterday her weight was 154.  I am unsure if this is accurate.  She reports a good appetite and does not feel that she has lost much weight.  She is being followed by dietitian and is on a nutritional supplement to help with wound healing.  She denies any calf tenderness and has good pedal pulses bilaterally.  Her left leg does have nonpitting soft edema greater than her right.        Review of Systems   Patient denies fever, chills, headache, lightheadedness, dizziness, rhinorrhea, cough, congestion, shortness of breath, chest pain, palpitations, abdominal pain, n/v, diarrhea, constipation, change in appetite, dysuria, frequency, burning or pain with urination.  Other than stated in HPI all other review of systems is negative.           Physical Exam   Vital signs: /72, heart rate 61, respirations 16, temp 98.4.  GENERAL APPEARANCE: Well developed, well nourished, in no acute distress.  HEENT: normocephalic, atraumatic, sclerae anicteric, conjunctivae clear and moist, EOM intact  External inspection of ears and nose showed no scars, lesions or masses.  LUNGS: respiratory effort normal.  CARD: Heart tones were not palpated in  order to maintain social distancing during the COVID crisis.  MSK: Muscle strength and tone are equal bilaterally  EXTREMITIES: Soft nonpitting edema left greater than right mostly on the feet.    NEURO: Alert and oriented x 3.Face is symmetric.  SKIN: Shallow small wound of 1 cm on her right calf that has good granulation without surrounding signs or symptoms of infection, deep tracking wound on right inner thigh that is being packed daily, no surrounding signs and symptoms of infection.  Did not view her coccyx wound.  PSYCH: euthymic          Labs:    Recent Results (from the past 240 hour(s))   Ferritin   Result Value Ref Range    Ferritin 335 (H) 10 - 130 ng/mL   Folate, Serum   Result Value Ref Range    Folate 15.5 >=3.5 ng/mL   Iron and Transferrin Iron Binding Capacity   Result Value Ref Range    Iron 81 42 - 175 ug/dL    Transferrin 193 (L) 212 - 360 mg/dL    Transferrin Saturation, Calculated 34 20 - 50 %    Transferrin IBC, Calculated 241 (L) 313 - 563 ug/dL   Magnesium   Result Value Ref Range    Magnesium 2.1 1.8 - 2.6 mg/dL   Sodium   Result Value Ref Range    Sodium 134 (L) 136 - 145 mmol/L   Thyroid Stimulating Hormone (TSH)   Result Value Ref Range    TSH 1.82 0.30 - 5.00 uIU/mL   Vitamin B12   Result Value Ref Range    Vitamin B-12 482 213 - 816 pg/mL   Hemoglobin   Result Value Ref Range    Hemoglobin 10.0 (L) 12.0 - 16.0 g/dL         Assessment:  1. Closed fracture of distal end of left fibula with routine healing, unspecified fracture morphology, subsequent encounter     2. Blood loss anemia     3. Pain management     4. Weight loss     5. Loose stools         Plan:   Ankle fracture: Healed, continue with therapies, pain is well managed.  On aspirin 81 mg twice daily we will continue at this time due to her decreased mobility.    Blood loss anemia: Last hemoglobin on 4/27 was 10.0.  B12 and folate were in normal range transferrin was low and so she was started on iron sulfate continue with iron  sulfate.    Management: No pain at this time will discontinue tramadol and change Tylenol to PRN    Weight loss: Patient believes that an  air as she felt she had her cam boot on and multiple layers of clothes when she first was admitted.  We will continue to monitor and continue with current supplement.    Loose stools: Patient feels her loose stools are improving slightly.  She continues to have very soft stools likely related to antibiotics.  Continue to monitor.      Electronically signed by: Danelle Pandey CNP

## 2021-06-20 NOTE — LETTER
Letter by Nazanin Gonsalves CNP at      Author: Nazanin Gonsalves CNP Service: -- Author Type: --    Filed:  Encounter Date: 1/21/2020 Status: Signed         Patient: Mitzi De Santiago   MR Number: [id number]   YOB: 1934   Date of Visit: 1/21/2020     [facility]    Facility:   [facility] [[id number]]   Code Status: FULL CODE      CHIEF COMPLAINT/REASON FOR VISIT:  Chief Complaint   Patient presents with   ? Review Of Multiple Medical Conditions       HISTORY:      HPI: Mitzi is a 85 y.o. female undergoing physical and occupational therapy at [facility] transitional care unit.  She is with past medical history of hypertension, hematoma, chronic kidney disease stage II-III and osteoarthritis. TodayDebility PT OT therapy to evaluate for lymph wraps who presented to the emergency department for evaluation of right hip pain after mechanical fall.  She underwent a reduction under anesthesia.  She is with a history of a total right hip arthroplasty the femoral prosthesis is dislocated superiorly and no fracture.  She also had a hypertension crisis during her hospitalization per hospital report probably secondary to pain she did receive IV hydralazine.    Today she is seen as a routine visit to review multiple medical issues.  She denies chest pain or shortness of breath.  She is moving her bowels and has  no issues with urination.  She is alert and oriented x4.   Her pain is controlled.  She does have a coccyx wound and being followed by the  wound nurse.  Per staff her wound is 100% granulated.  She did follow-up with orthopedics on 1/20/2020 and she is to continue with her abductor brace for 2 more weeks orthopedics had no concerns and she will follow-up as needed.   She is with lower extremity edema and wearing Compression   Her CK is WNL at  48.  She is making progress, she was able to walk the bars.  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      Past Medical History:   Diagnosis Date   ? Basal cell carcinoma 9/2000    forehead   ? Bunion    ? Cellulitis    ? Fatty liver    ? Hammer toe    ? Hypertension    ? Open wound(s) (multiple) of unspecified site(s), without mention of complication    ? Papilloma of breast              Family History   Problem Relation Age of Onset   ? No Medical Problems Mother    ? Stroke Father    ? No Medical Problems Sister    ? No Medical Problems Daughter    ? No Medical Problems Maternal Grandmother    ? No Medical Problems Maternal Grandfather    ? No Medical Problems Paternal Grandmother    ? No Medical Problems Paternal Grandfather    ? No Medical Problems Maternal Aunt    ? No Medical Problems Paternal Aunt    ? BRCA 1/2 Neg Hx    ? Breast cancer Neg Hx    ? Cancer Neg Hx    ? Colon cancer Neg Hx    ? Endometrial cancer Neg Hx    ? Ovarian cancer Neg Hx      Social History     Socioeconomic History   ? Marital status:      Spouse name: Not on file   ? Number of children: Not on file   ? Years of education: Not on file   ? Highest education level: Not on file   Occupational History   ? Not on file   Social Needs   ? Financial resource strain: Not on file   ? Food insecurity:     Worry: Not on file     Inability: Not on file   ? Transportation needs:     Medical: Not on file     Non-medical: Not on file   Tobacco Use   ? Smoking status: Never Smoker   ? Smokeless tobacco: Never Used   Substance and Sexual Activity   ? Alcohol use: No   ? Drug use: Never   ? Sexual activity: Not on file   Lifestyle   ? Physical activity:     Days per week: Not on file     Minutes per session: Not on file   ? Stress: Not on file   Relationships   ? Social connections:     Talks on phone: Not on file     Gets together: Not on file     Attends Gnosticist service: Not on file     Active member of club or organization: Not on file     Attends meetings of clubs or organizations: Not on file     Relationship status: Not on file   ?  Intimate partner violence:     Fear of current or ex partner: Not on file     Emotionally abused: Not on file     Physically abused: Not on file     Forced sexual activity: Not on file   Other Topics Concern   ? Not on file   Social History Narrative    Living at home alone.  and has 1 daughter in [place].          Review of Systems   Constitutional: Positive for activity change and fatigue. Negative for appetite change and fever.   HENT: Negative for congestion.    Respiratory: Negative for cough, shortness of breath and wheezing.    Cardiovascular: Negative for chest pain and leg swelling.   Gastrointestinal: Negative for abdominal distention, abdominal pain, constipation, diarrhea and nausea.   Genitourinary: Negative for dysuria.   Musculoskeletal: Positive for arthralgias. Negative for back pain.   Skin: Positive for wound. Negative for color change.   Neurological: Negative for dizziness.   Psychiatric/Behavioral: Negative for agitation, behavioral problems and confusion.       Vitals:    01/21/20 0839   BP: 154/68   Pulse: (!) 56   Resp: 18   Temp: 98  F (36.7  C)   SpO2: 96%   Weight: 163 lb 6.4 oz (74.1 kg)       Physical Exam  Constitutional:       Appearance: She is well-developed.      Comments: Pleasant woman in no acute distress   HENT:      Head: Normocephalic.   Eyes:      Conjunctiva/sclera: Conjunctivae normal.   Neck:      Musculoskeletal: Normal range of motion.   Cardiovascular:      Rate and Rhythm: Normal rate and regular rhythm.      Heart sounds: Normal heart sounds. No murmur.   Pulmonary:      Effort: No respiratory distress.      Breath sounds: Normal breath sounds. No wheezing or rales.   Abdominal:      General: Bowel sounds are normal. There is no distension.      Palpations: Abdomen is soft.      Tenderness: There is no abdominal tenderness.   Musculoskeletal: Normal range of motion.      Comments: A hinged hip brace  Decreased range of motion left upper extremity she reports it  has been this way for years.   Skin:     General: Skin is warm.      Comments: Coccyx wound being followed by the wound care team  Rash in her abdominal folds nystatin powder ordered   Neurological:      Mental Status: She is alert and oriented to person, place, and time.   Psychiatric:         Behavior: Behavior normal.           LABS:   Recent Results (from the past 240 hour(s))   Basic Metabolic Panel   Result Value Ref Range    Sodium 137 136 - 145 mmol/L    Potassium 4.0 3.5 - 5.0 mmol/L    Chloride 106 98 - 107 mmol/L    CO2 23 22 - 31 mmol/L    Anion Gap, Calculation 8 5 - 18 mmol/L    Glucose 92 70 - 125 mg/dL    Calcium 8.8 8.5 - 10.5 mg/dL    BUN 23 8 - 28 mg/dL    Creatinine 0.80 0.60 - 1.10 mg/dL    GFR MDRD Af Amer >60 >60 mL/min/1.73m2    GFR MDRD Non Af Amer >60 >60 mL/min/1.73m2   CK Total   Result Value Ref Range    CK, Total 48 30 - 190 U/L       ASSESSMENT:      ICD-10-CM    1. Accelerated hypertension I10    2. Status post total replacement of right hip Z96.641    3. Pain management R52        PLAN:      Hypertension continue atenolol and hydrochlorothiazide blood pressure stable    Coccyx wound continue dressing changes she is being followed by the wound care team    Pain management patient reports pain controlled on scheduled Tylenol    Anticoagulation currently on 81 mg twice daily    Reduction of a right dislocated hip patient underwent reduction in the OR under anesthesia failed attempt in the ER, patient with a hinged hip brace    [facility]ility PT OT    Rhabdomyolysis-elevated CK on 1/6/2020 and now within normal limits at 48    Electronically signed by: Naaznin Gonsalves CNP

## 2021-06-20 NOTE — LETTER
Letter by Nazanin Gonsalves CNP at      Author: Nazanin Gonsalves CNP Service: -- Author Type: --    Filed:  Encounter Date: 1/13/2020 Status: Signed         Patient: Mitzi De Santiago   MR Number: [id number]   YOB: 1934   Date of Visit: 1/13/2020     [facility]    Facility:   [facility] [[id number]]   Code Status: FULL CODE      CHIEF COMPLAINT/REASON FOR VISIT:  Chief Complaint   Patient presents with   ? Review Of Multiple Medical Conditions       HISTORY:      HPI: Mitzi is a 85 y.o. female undergoing physical and occupational therapy at [facility] transitional care unit.  She is with past medical history of hypertension, hematoma, chronic kidney disease stage II-III and osteoarthritis. TodayDebility PT OT therapy to evaluate for lymph wraps who presented to the emergency department for evaluation of right hip pain after mechanical fall.  She underwent a reduction under anesthesia.  She is with a history of a total right hip arthroplasty the femoral prosthesis is dislocated superiorly and no fracture.  She also had a hypertension crisis during her hospitalization per hospital report probably secondary to pain she did receive IV hydralazine.    Today she is seen as a routine visit to review multiple medical issues.  She denies chest pain or shortness of breath.  She reports no BM x 2 days. She denied feeling constipated and is passing flatus.  no issues with urination.  She is alert and oriented x4.   Her pain is controlled.  She does have a coccyx wound and being followed by the  wound nurse.  She does have a hinged hip brace for when she is out of bed.  She was noted to have +3 left lower extremity and +2 in the right lower extremity Compression was ordered.  Her weight is up  5 pounds since admission, awaiting a new weight.     BMP today was within normal limits.  Her CK is  48.  Today I am told by the therapist that he she is making progress,  she was able to walk the bars on Friday.  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    Past Medical History:   Diagnosis Date   ? Basal cell carcinoma 9/2000    forehead   ? Bunion    ? Cellulitis    ? Fatty liver    ? Hammer toe    ? Hypertension    ? Open wound(s) (multiple) of unspecified site(s), without mention of complication    ? Papilloma of breast              Family History   Problem Relation Age of Onset   ? No Medical Problems Mother    ? Stroke Father    ? No Medical Problems Sister    ? No Medical Problems Daughter    ? No Medical Problems Maternal Grandmother    ? No Medical Problems Maternal Grandfather    ? No Medical Problems Paternal Grandmother    ? No Medical Problems Paternal Grandfather    ? No Medical Problems Maternal Aunt    ? No Medical Problems Paternal Aunt    ? BRCA 1/2 Neg Hx    ? Breast cancer Neg Hx    ? Cancer Neg Hx    ? Colon cancer Neg Hx    ? Endometrial cancer Neg Hx    ? Ovarian cancer Neg Hx      Social History     Socioeconomic History   ? Marital status:      Spouse name: Not on file   ? Number of children: Not on file   ? Years of education: Not on file   ? Highest education level: Not on file   Occupational History   ? Not on file   Social Needs   ? Financial resource strain: Not on file   ? Food insecurity:     Worry: Not on file     Inability: Not on file   ? Transportation needs:     Medical: Not on file     Non-medical: Not on file   Tobacco Use   ? Smoking status: Never Smoker   ? Smokeless tobacco: Never Used   Substance and Sexual Activity   ? Alcohol use: No   ? Drug use: Never   ? Sexual activity: Not on file   Lifestyle   ? Physical activity:     Days per week: Not on file     Minutes per session: Not on file   ? Stress: Not on file   Relationships   ? Social connections:     Talks on phone: Not on file     Gets together: Not on file     Attends Jewish service: Not on file     Active member of club or organization: Not on file     Attends meetings of  clubs or organizations: Not on file     Relationship status: Not on file   ? Intimate partner violence:     Fear of current or ex partner: Not on file     Emotionally abused: Not on file     Physically abused: Not on file     Forced sexual activity: Not on file   Other Topics Concern   ? Not on file   Social History Narrative    Living at home alone.  and has 1 daughter in [place].          Review of Systems   Constitutional: Positive for activity change and fatigue. Negative for appetite change and fever.   HENT: Negative for congestion.    Respiratory: Negative for cough, shortness of breath and wheezing.    Cardiovascular: Negative for chest pain and leg swelling.   Gastrointestinal: Negative for abdominal distention, abdominal pain, constipation, diarrhea and nausea.   Genitourinary: Negative for dysuria.   Musculoskeletal: Positive for arthralgias. Negative for back pain.   Skin: Positive for wound. Negative for color change.   Neurological: Negative for dizziness.   Psychiatric/Behavioral: Negative for agitation, behavioral problems and confusion.       Vitals:    01/13/20 0821   BP: 142/60   Pulse: (!) 59   Resp: 16   Temp: 97.7  F (36.5  C)   SpO2: 95%   Weight: 171 lb (77.6 kg)       Physical Exam  Constitutional:       Appearance: She is well-developed.      Comments: Pleasant woman in no acute distress   HENT:      Head: Normocephalic.   Eyes:      Conjunctiva/sclera: Conjunctivae normal.   Neck:      Musculoskeletal: Normal range of motion.   Cardiovascular:      Rate and Rhythm: Normal rate and regular rhythm.      Heart sounds: Normal heart sounds. No murmur.   Pulmonary:      Effort: No respiratory distress.      Breath sounds: Normal breath sounds. No wheezing or rales.   Abdominal:      General: Bowel sounds are normal. There is no distension.      Palpations: Abdomen is soft.      Tenderness: There is no abdominal tenderness.   Musculoskeletal: Normal range of motion.      Comments: A hinged  hip brace  Decreased range of motion left upper extremity she reports it has been this way for years.   Skin:     General: Skin is warm.      Comments: Coccyx wound being followed by the wound care team  Rash in her abdominal folds nystatin powder ordered   Neurological:      Mental Status: She is alert and oriented to person, place, and time.   Psychiatric:         Behavior: Behavior normal.           LABS:   Recent Results (from the past 240 hour(s))   Basic Metabolic Panel   Result Value Ref Range    Sodium 140 136 - 145 mmol/L    Potassium 3.9 3.5 - 5.0 mmol/L    Chloride 103 98 - 107 mmol/L    CO2 21 (L) 22 - 31 mmol/L    Anion Gap, Calculation 16 5 - 18 mmol/L    Glucose 154 (H) 70 - 125 mg/dL    Calcium 9.6 8.5 - 10.5 mg/dL    BUN 17 8 - 28 mg/dL    Creatinine 1.34 (H) 0.60 - 1.10 mg/dL    GFR MDRD Af Amer 46 (L) >60 mL/min/1.73m2    GFR MDRD Non Af Amer 38 (L) >60 mL/min/1.73m2   Hepatic Profile   Result Value Ref Range    Bilirubin, Total 1.0 0.0 - 1.0 mg/dL    Bilirubin, Direct 0.4 <=0.5 mg/dL    Protein, Total 7.4 6.0 - 8.0 g/dL    Albumin 3.2 (L) 3.5 - 5.0 g/dL    Alkaline Phosphatase 109 45 - 120 U/L    AST 60 (H) 0 - 40 U/L    ALT 22 0 - 45 U/L   HM2 (CBC W/O DIFF)   Result Value Ref Range    WBC 9.7 4.0 - 11.0 thou/uL    RBC 3.09 (L) 3.80 - 5.40 mill/uL    Hemoglobin 10.8 (L) 12.0 - 16.0 g/dL    Hematocrit 32.1 (L) 35.0 - 47.0 %     (H) 80 - 100 fL    MCH 35.0 (H) 27.0 - 34.0 pg    MCHC 33.6 32.0 - 36.0 g/dL    RDW 14.3 11.0 - 14.5 %    Platelets 140 140 - 440 thou/uL    MPV 9.4 8.5 - 12.5 fL   Magnesium   Result Value Ref Range    Magnesium 1.7 (L) 1.8 - 2.6 mg/dL   CK   Result Value Ref Range    CK, Total 2,018 (HH) 30 - 190 U/L   C-reactive protein   Result Value Ref Range    CRP 3.9 (H) 0.0 - 0.8 mg/dL   Thyroid Cascade   Result Value Ref Range    TSH 1.52 0.30 - 5.00 uIU/mL   Urinalysis-UC if Indicated   Result Value Ref Range    Color, UA Yellow Colorless, Yellow, Straw, Light Yellow     Clarity, UA Clear Clear    Glucose, UA Negative Negative    Bilirubin, UA Negative Negative    Ketones, UA Negative Negative, 60 mg/dL    Specific Gravity, UA 1.013 1.001 - 1.030    Blood, UA Large (!) Negative    pH, UA 6.0 4.5 - 8.0    Protein, UA 30 mg/dL (!) Negative mg/dL    Urobilinogen, UA <2.0 E.U./dL <2.0 E.U./dL, 2.0 E.U./dL    Nitrite, UA Negative Negative    Leukocytes, UA Negative Negative    Bacteria, UA None Seen None Seen hpf    RBC, UA 0-2 None Seen, 0-2 hpf    WBC, UA 0-5 None Seen, 0-5 hpf    Squam Epithel, UA 25-50 (!) None Seen, 0-5 lpf    Hyaline Casts, UA 0-5 0-5, None Seen lpf   Pregnancy, urine   Result Value Ref Range    Pregnancy Test, Urine Negative Negative   Comprehensive metabolic panel   Result Value Ref Range    Sodium 140 136 - 145 mmol/L    Potassium 3.9 3.5 - 5.0 mmol/L    Chloride 109 (H) 98 - 107 mmol/L    CO2 23 22 - 31 mmol/L    Anion Gap, Calculation 8 5 - 18 mmol/L    Glucose 107 70 - 125 mg/dL    BUN 22 8 - 28 mg/dL    Creatinine 1.16 (H) 0.60 - 1.10 mg/dL    GFR MDRD Af Amer 54 (L) >60 mL/min/1.73m2    GFR MDRD Non Af Amer 44 (L) >60 mL/min/1.73m2    Bilirubin, Total 1.0 0.0 - 1.0 mg/dL    Calcium 8.0 (L) 8.5 - 10.5 mg/dL    Protein, Total 5.3 (L) 6.0 - 8.0 g/dL    Albumin 2.3 (L) 3.5 - 5.0 g/dL    Alkaline Phosphatase 69 45 - 120 U/L    AST 87 (H) 0 - 40 U/L    ALT 31 0 - 45 U/L   CK Total   Result Value Ref Range    CK, Total 2,927 (HH) 30 - 190 U/L   HM1 (CBC with Diff)   Result Value Ref Range    WBC 4.8 4.0 - 11.0 thou/uL    RBC 2.10 (L) 3.80 - 5.40 mill/uL    Hemoglobin 7.5 (L) 12.0 - 16.0 g/dL    Hematocrit 22.5 (L) 35.0 - 47.0 %     (H) 80 - 100 fL    MCH 35.7 (H) 27.0 - 34.0 pg    MCHC 33.3 32.0 - 36.0 g/dL    RDW 14.5 11.0 - 14.5 %    Platelets 100 (L) 140 - 440 thou/uL    MPV 9.9 8.5 - 12.5 fL    Neutrophils % 77 (H) 50 - 70 %    Lymphocytes % 15 (L) 20 - 40 %    Monocytes % 7 2 - 10 %    Eosinophils % 0 0 - 6 %    Basophils % 0 0 - 2 %    Neutrophils  Absolute 3.7 2.0 - 7.7 thou/uL    Lymphocytes Absolute 0.7 (L) 0.8 - 4.4 thou/uL    Monocytes Absolute 0.3 0.0 - 0.9 thou/uL    Eosinophils Absolute 0.0 0.0 - 0.4 thou/uL    Basophils Absolute 0.0 0.0 - 0.2 thou/uL   Basic Metabolic Panel   Result Value Ref Range    Sodium 139 136 - 145 mmol/L    Potassium 3.5 3.5 - 5.0 mmol/L    Chloride 110 (H) 98 - 107 mmol/L    CO2 24 22 - 31 mmol/L    Anion Gap, Calculation 5 5 - 18 mmol/L    Glucose 90 70 - 125 mg/dL    Calcium 7.6 (L) 8.5 - 10.5 mg/dL    BUN 21 8 - 28 mg/dL    Creatinine 0.95 0.60 - 1.10 mg/dL    GFR MDRD Af Amer >60 >60 mL/min/1.73m2    GFR MDRD Non Af Amer 56 (L) >60 mL/min/1.73m2   CK Total   Result Value Ref Range    CK, Total 2,295 (HH) 30 - 190 U/L   HM2(CBC w/o Differential)   Result Value Ref Range    WBC 4.7 4.0 - 11.0 thou/uL    RBC 2.02 (L) 3.80 - 5.40 mill/uL    Hemoglobin 7.0 (L) 12.0 - 16.0 g/dL    Hematocrit 21.4 (L) 35.0 - 47.0 %     (H) 80 - 100 fL    MCH 34.7 (H) 27.0 - 34.0 pg    MCHC 32.7 32.0 - 36.0 g/dL    RDW 14.8 (H) 11.0 - 14.5 %    Platelets 104 (L) 140 - 440 thou/uL    MPV 9.8 8.5 - 12.5 fL   Type and Screen   Result Value Ref Range    ABORh O POS     Antibody Screen Negative Negative   Magnesium   Result Value Ref Range    Magnesium 1.9 1.8 - 2.6 mg/dL   Hemoglobin   Result Value Ref Range    Hemoglobin 9.6 (L) 12.0 - 16.0 g/dL   Crossmatch   Result Value Ref Range    Crossmatch Compatible     Blood Expiration Date [id number]     Unit Type O Pos     Unit Number [id number]     Status Transfused     Component Red Blood Cells     PRODUCT CODE J1756Y33     Issue Date and Time [id number]     Blood Type 5100     CODING SYSTEM [id number]    Crossmatch   Result Value Ref Range    Crossmatch Compatible     Blood Expiration Date [id number]     Unit Type O Pos     Unit Number [id number]     Status Transfused     Component Red Blood Cells     PRODUCT CODE [id number]     Issue Date and Time [id number]     Blood Type 5100      CODING SYSTEM [id number]        ASSESSMENT:      ICD-10-CM    1. Accelerated hypertension I10    2. Open wound(s) (multiple) of unspecified site(s), without mention of complication T14.8XXA    3. Pain management R52    4. Anticoagulated Z79.01        PLAN:    Hypertension continue atenolol and hydrochlorothiazide blood pressure stable    Coccyx wound continue dressing changes she is being followed by the wound care team    Pain management patient reports pain controlled on scheduled Tylenol    Anticoagulation currently on 81 mg twice daily    Reduction of a right dislocated hip patient underwent reduction in the OR under anesthesia failed attempt in the ER, patient with a hinged hip brace    Debility PT OT    Rhabdomyolysis-elevated CK on 1/6/2020 and now within normal limits at 48    Electronically signed by: Nazanin Gonsalves CNP

## 2021-06-20 NOTE — LETTER
Letter by Nazanin Gonsalves CNP at      Author: Nazanin Gonsalves CNP Service: -- Author Type: --    Filed:  Encounter Date: 3/11/2020 Status: (Other)         Patient: Mitzi De Santiago   MR Number: [id number]   YOB: 1934   Date of Visit: 3/11/2020     [facility]    Facility:   [facility] [[id number]]   Code Status: FULL CODE  PCP: Jerry Araujo MD   Phone: [phone]   Fax: [phone]      CHIEF COMPLAINT/REASON FOR VISIT:  Chief Complaint   Patient presents with   ? Discharge Summary       HISTORY COURSE:  Mitzi is a 85 y.o. female undergoing physical and occupational therapy at [facility] transitional care unit.  She is with past medical history of hypertension, hematoma, chronic kidney disease stage II-III and osteoarthritis. TodayDebility PT OT therapy to evaluate for lymph wraps who presented to the emergency department for evaluation of right hip pain after mechanical fall.  She underwent a reduction under anesthesia.  She is with a history of a total right hip arthroplasty the femoral prosthesis is dislocated superiorly and no fracture.  She also had a hypertension crisis during her hospitalization per hospital report probably secondary to pain she did receive IV hydralazine.     Today she is seen for  a face-to-face for discharge.  Patient will discharge to home today with current medications and treatments.  She will also have \[facility]\"" home care services PT OT RN home health aide and a .  It is recommended that patient have 24-hour care and currently her kids are in town from [place] however it is unclear how long they will be in town.  [facility] report to be [facility] by facility  patient continues to have cellulitis right.  medial thigh.  It was recommended she use warm packs for 20-minute increments at home and to follow-up with her primary MD. she is currently completing a course of Keflex.  A RLE US and Uric acid were  both negative.  She was started on Keflex three times a day per renal dosing. She was recently treated for a positive UTI with accompanying fevers.   Her culture was >100,00 E Coli and she was treated with bactrim DS x 3 days. She denies  pain with urination. Her LS were clear and she had no cough .    She denies chest pain or shortness of breath.  She is moving her bowels.  She is alert and oriented x4.   She denies pain  She is no longer wearing the abductor brace but is with hip precautions.  She is ambulating with a walker. Today she is afebrile at 97.1.      Review of Systems  Constitutional: Positive for activity change and fatigue. Negative for appetite change and fever.   HENT: Negative for congestion.    Respiratory: Negative for cough, shortness of breath and wheezing.    Cardiovascular: Negative for chest pain and leg swelling.   Gastrointestinal: Negative for abdominal distention, abdominal pain, constipation, diarrhea and nausea.        Pt with an umbilical hernia    Genitourinary: Negative for dysuria.   Musculoskeletal: Positive for arthralgias. Negative for back pain.   Skin: Positive for wound. Negative for color change.   Neurological: Negative for dizziness.   Psychiatric/Behavioral: Negative for agitation, behavioral problems and confusion.   Vitals:    03/11/20 0909   BP: 151/63   Pulse: 64   Resp: 18   Temp: 97.1  F (36.2  C)   SpO2: 97%   Weight: 162 lb 3.2 oz (73.6 kg)       Physical Exam  Constitutional:       Appearance: She is well-developed.      Comments: Pleasant woman in no acute distress   HENT:      Head: Normocephalic.   Eyes:      Conjunctiva/sclera: Conjunctivae normal.   Neck:      Musculoskeletal: Normal range of motion.   Cardiovascular:      Rate and Rhythm: Normal rate and regular rhythm.      Heart sounds: Normal heart sounds. No murmur.   Pulmonary:      Effort: No respiratory distress.      Breath sounds: Normal breath sounds. No wheezing or rales.   Abdominal:       General: Bowel sounds are normal. There is no distension.      Palpations: Abdomen is soft.      Tenderness: There is no abdominal tenderness.   Musculoskeletal: Normal range of motion.      Comments:   Decreased range of motion left upper extremity she reports it has been this way for years.   Skin:     General: Skin is warm.   Redness and warmth right medial  distal thigh      Neurological:      Mental Status: She is alert and oriented to person, place, and time.   Psychiatric:         Behavior: Behavior normal.  MEDICATION LIST:  Current Outpatient Medications   Medication Sig   ? acetaminophen (TYLENOL) 500 MG tablet Take 2 tablets (1,000 mg total) by mouth 3 (three) times a day.   ? aspirin 81 mg chewable tablet Chew 1 tablet (81 mg total) 2 (two) times a day.   ? atenolol (TENORMIN) 50 MG tablet Take 50 mg by mouth daily.   ? CALCIUM CARBONATE (CALCIUM 500 ORAL) Take 1 tablet by mouth daily.    ? cephalexin (KEFLEX) 500 MG capsule Take 500 mg by mouth 3 (three) times a day.   ? glucosamine sulfate 500 mg cap Take 500 mg by mouth daily.   ? hydrochlorothiazide (HYDRODIURIL) 25 MG tablet Take 25 mg by mouth daily.   ? nystatin (MYCOSTATIN) powder Apply 1 application topically 2 (two) times a day.   ? omega-3 fatty acids 1,000 mg cap Take by mouth daily.   ? polyethylene glycol (MIRALAX) 17 gram packet Take 1 packet (17 g total) by mouth daily as needed (constipation).   ? potassium chloride (KLOR-CON) 10 MEQ CR tablet Take 10 mEq by mouth daily.   ? senna-docusate (PERICOLACE) 8.6-50 mg tablet Take 1 tablet by mouth 2 (two) times a day.       DISCHARGE DIAGNOSIS:    ICD-10-CM    1. Traumatic rhabdomyolysis, subsequent encounter  T79.6XXD    2. Cellulitis of right lower extremity  L03.115        MEDICAL EQUIPMENT NEEDS:  None     DISCHARGE PLAN/FACE TO FACE:  I certify that services are/were furnished while this patient was under the care of a physician and that a physician or an allowed non-physician  practitioner (NPP), had a face-to-face encounter that meets the physician face-to-face encounter requirements. The encounter was in whole, or in part, related to the primary reason for home health. The patient is confined to his/her home and needs intermittent skilled nursing, physical therapy, speech-language pathology, or the continued need for occupational therapy. A plan of care has been established by a physician and is periodically reviewed by a physician.  Date of Face-to-Face Encounter: 3/11/20    I certify that, based on my findings, the following services are medically necessary home health services: PT OT RN home health aide and a     My clinical findings support the need for the above skilled services because: PT OT for continued strength and endurance, home health aide to assist with activities of daily living, RN for vital signs and medication management also monitoring of right knee ADLs cellulitis,  to assist with community resources.    This patient is homebound because: She is deconditioned and only able to ambulate 250 feet, she also requires 24-hour care.    The patient is, or has been, under my care and I have initiated the establishment of the plan of care. This patient will be followed by a physician who will periodically review the plan of care.    Schedule follow up visit with primary care provider within 7 days to reestablish care.    Electronically signed by: Nazanin Gonsalves CNP    Attestation signed by Geni Brandon MD at 3/15/2020  5:57 PM:    Electronically signed by: Geni Brandon MD

## 2021-06-20 NOTE — LETTER
Letter by Danelle Pandey CNP at      Author: Danelle Pandey CNP Service: -- Author Type: --    Filed:  Encounter Date: 5/5/2020 Status: (Other)         Patient: Mitzi De Santiago   MR Number: [id number]   YOB: 1934   Date of Visit: 5/5/2020     Code Status:  DNR  Visit Type: Follow Up (pain, loose stools, bradycardia)     Facility:  [facility] [[id number]]        Facility Type: SNF (Skilled Nursing Facility, TCU)    History of Present Illness: Mitzi De Santiago is a 85 y.o. female with a past medical history for CKD, chronic anemia, mild cognitive impairment, hypertension, osteoarthritis, multiple chronic wounds.  She was recently hospitalized 5/3/2020 to 5/10/2020 after a fall at home.  She had been hospitalized in January 2020 in which she also had a fall resulting in dislocation of her prosthetic hip.  She was taken to the OR for closed reduction.  She did have a hypertensive crisis likely related to pain and anemia with a hemoglobin at 7.5.  She was given PRBCs.  It was also discovered that she had a coccyx wound that was treated.  She then was discharged to [facility] in in January in which she developed a red distal thigh and DVT was ruled out by Doppler.  She was placed on Keflex considering likely cellulitis.  She was then discharged to home against TCU's recommendations as they felt she needed 24-hour care and family could not provide this.  During the most recent hospitalization she was found to have a left distal fibular fracture.  She was evaluated by orthopedics and they recommended weightbearing as tolerated with a cam boot.  Her pain was controlled with Tylenol and tramadol.  Her hemoglobin was stable at 8.6.  She also was found to have a right thigh wound with possible abscess.  Blood cultures were negative and the wound was treated and she was placed on clindamycin.  Doppler of right leg was negative for DVT.  She also was treated with ceftriaxone for 5 days for an E. coli  UTI.    Today, she reports that her loose stools are starting to improve with more formed stools.  She has refused to take the arginaid and nutritional supplement and feels her bowels have improved since then.  Her HR has been consistently in the 50s.  She denies any dizziness or lightheadness.  She is on atenolol for HTN along with HCTZ and lisinopril.  Her weight continues to go down and was 149lbs today.  I believe that she has had some weight loss however wonder if 15lb loss is an error as she has been refusing to stand on the scale but rather is weighed in a wheelchair.       Review of Systems   Patient denies fever, chills, headache, lightheadedness, dizziness, rhinorrhea, cough, congestion, shortness of breath, chest pain, palpitations, abdominal pain, n/v, diarrhea, constipation, change in appetite, dysuria, frequency, burning or pain with urination.  Other than stated in HPI all other review of systems is negative.           Physical Exam   Vital signs: /58, HR 57, resp 16, temp 98.4  GENERAL APPEARANCE: Well developed, well nourished, in no acute distress.  HEENT: normocephalic, atraumatic, sclerae anicteric, conjunctivae clear and moist, EOM intact  LUNGS: respiratory effort normal.  CARD: Heart tones were not auscultated in order to maintain social distancing during the COVID crisis.  EXTREMITIES: Soft nonpitting edema left greater than right mostly on the feet.  No calf tenderness  NEURO: Alert and oriented x 3.Face is symmetric.  SKIN: nursing reports the wounds are improving with current wound cares.   PSYCH: euthymic          Labs:    Recent Results (from the past 240 hour(s))   Hemoglobin   Result Value Ref Range    Hemoglobin 10.0 (L) 12.0 - 16.0 g/dL         Assessment:  1. Closed fracture of distal end of left fibula with routine healing, unspecified fracture morphology, subsequent encounter     2. Weight loss     3. Loose stools     4. Debility         Plan:   Fracture: stable, pain is  managed with prn tylenol.  Continue with therapies.     Weight loss: likely related to malnutrition, refusing nutritional drink at this time due to thought to cause loose stools.  Reports she is eating well and has a good appetite.  Continue to monitor; appears euvolemic so I don't believe it is related to the HCTZ at this time.      Loose stools: improving, if reoccurs could consider a fiber supplement.     Bradycardia: medication induced, decreased Atenolol 25mg daily and monitor.      Debility: continue with therapies, she will need a higher level of care at [place].  Spoke with the nursing supervisor and they are attempting to get her into the [facility] however they can not provide her wound cares at this time.            Electronically signed by: Danelle Pandey CNP

## 2021-06-20 NOTE — LETTER
Letter by Nazanin Gonsalves CNP at      Author: Nazanin Gonsalves CNP Service: -- Author Type: --    Filed:  Encounter Date: 2/18/2020 Status: (Other)         Patient: Mitzi De Santiago   MR Number: [id number]   YOB: 1934   Date of Visit: 2/18/2020     [facility]    Facility:   [facility] [[id number]]   Code Status: FULL CODE      CHIEF COMPLAINT/REASON FOR VISIT:  Chief Complaint   Patient presents with   ? Review Of Multiple Medical Conditions       HISTORY:      HPI: Mitzi is a 85 y.o. female undergoing physical and occupational therapy at [facility] transitional care unit.  She is with past medical history of hypertension, hematoma, chronic kidney disease stage II-III and osteoarthritis. TodayDebility PT OT therapy to evaluate for lymph wraps who presented to the emergency department for evaluation of right hip pain after mechanical fall.  She underwent a reduction under anesthesia.  She is with a history of a total right hip arthroplasty the femoral prosthesis is dislocated superiorly and no fracture.  She also had a hypertension crisis during her hospitalization per hospital report probably secondary to pain she did receive IV hydralazine.    Today she is seen for a routine visit to review multiple medical issues.  She reports she is doing leg exercising and ambulating a little bit with a walker.  She no longer needs hip brace.  She feels she is making progress and getting stronger in therapy.  She denies chest pain or shortness of breath.  She is moving her bowels and has  no issues with urination.  She is alert and oriented x4.   She denies pain    Her last CK is WNL at  48.  Denies any congestion or cough.  Her weights are reviewed and she is up 4 pounds in the last week.    Past Medical History:   Diagnosis Date   ? Basal cell carcinoma 9/2000    forehead   ? Bunion    ? Cellulitis    ? Fatty liver    ? Hammer toe    ? Hypertension    ?  Open wound(s) (multiple) of unspecified site(s), without mention of complication    ? Papilloma of breast              Family History   Problem Relation Age of Onset   ? No Medical Problems Mother    ? Stroke Father    ? No Medical Problems Sister    ? No Medical Problems Daughter    ? No Medical Problems Maternal Grandmother    ? No Medical Problems Maternal Grandfather    ? No Medical Problems Paternal Grandmother    ? No Medical Problems Paternal Grandfather    ? No Medical Problems Maternal Aunt    ? No Medical Problems Paternal Aunt    ? BRCA 1/2 Neg Hx    ? Breast cancer Neg Hx    ? Cancer Neg Hx    ? Colon cancer Neg Hx    ? Endometrial cancer Neg Hx    ? Ovarian cancer Neg Hx      Social History     Socioeconomic History   ? Marital status:      Spouse name: Not on file   ? Number of children: Not on file   ? Years of education: Not on file   ? Highest education level: Not on file   Occupational History   ? Not on file   Social Needs   ? Financial resource strain: Not on file   ? Food insecurity:     Worry: Not on file     Inability: Not on file   ? Transportation needs:     Medical: Not on file     Non-medical: Not on file   Tobacco Use   ? Smoking status: Never Smoker   ? Smokeless tobacco: Never Used   Substance and Sexual Activity   ? Alcohol use: No   ? Drug use: Never   ? Sexual activity: Not on file   Lifestyle   ? Physical activity:     Days per week: Not on file     Minutes per session: Not on file   ? Stress: Not on file   Relationships   ? Social connections:     Talks on phone: Not on file     Gets together: Not on file     Attends Voodoo service: Not on file     Active member of club or organization: Not on file     Attends meetings of clubs or organizations: Not on file     Relationship status: Not on file   ? Intimate partner violence:     Fear of current or ex partner: Not on file     Emotionally abused: Not on file     Physically abused: Not on file     Forced sexual activity: Not  on file   Other Topics Concern   ? Not on file   Social History Narrative    Living at home alone.  and has 1 daughter in [place].          Review of Systems   Constitutional: Positive for activity change and fatigue. Negative for appetite change and fever.   HENT: Negative for congestion.    Respiratory: Negative for cough, shortness of breath and wheezing.    Cardiovascular: Negative for chest pain and leg swelling.   Gastrointestinal: Negative for abdominal distention, abdominal pain, constipation, diarrhea and nausea.   Genitourinary: Negative for dysuria.   Musculoskeletal: Positive for arthralgias. Negative for back pain.   Skin: Positive for wound. Negative for color change.   Neurological: Negative for dizziness.   Psychiatric/Behavioral: Negative for agitation, behavioral problems and confusion.       Vitals:    02/18/20 1031   BP: 153/81   Pulse: 66   Resp: 18   Temp: 98.6  F (37  C)   SpO2: 97%   Weight: 160 lb 12.8 oz (72.9 kg)       Physical Exam  Constitutional:       Appearance: She is well-developed.      Comments: Pleasant woman in no acute distress   HENT:      Head: Normocephalic.   Eyes:      Conjunctiva/sclera: Conjunctivae normal.   Neck:      Musculoskeletal: Normal range of motion.   Cardiovascular:      Rate and Rhythm: Normal rate and regular rhythm.      Heart sounds: Normal heart sounds. No murmur.   Pulmonary:      Effort: No respiratory distress.      Breath sounds: Normal breath sounds. No wheezing or rales.   Abdominal:      General: Bowel sounds are normal. There is no distension.      Palpations: Abdomen is soft.      Tenderness: There is no abdominal tenderness.   Musculoskeletal: Normal range of motion.      Comments:   Decreased range of motion left upper extremity she reports it has been this way for years.   Skin:     General: Skin is warm.      Comments: Coccyx wound being followed by the wound care team healing well per staff.      Neurological:      Mental Status: She  is alert and oriented to person, place, and time.   Psychiatric:         Behavior: Behavior normal.           LABS:   No results found for this or any previous visit (from the past 240 hour(s)).    ASSESSMENT:      ICD-10-CM    1. Posterior dislocation of right hip, initial encounter (H) S73.014A    2. Pain management R52    3. Debility R53.81        PLAN:       hypertension continue atenolol and hydrochlorothiazide blood pressure stable    Coccyx wound continue dressing changes she is being followed by the wound care team    Pain management patient reports pain controlled on scheduled Tylenol    Anticoagulation currently on 81 mg twice daily    Reduction of a right dislocated hip patient underwent reduction in the OR under anesthesia failed attempt in the ER, She no longer needs to wear the abductor brace.     Debility PT OT    Rhabdomyolysis-elevated CK on 1/6/2020 and now within normal limits at 48    Electronically signed by: Nazanin Gonsalves CNP

## 2021-06-20 NOTE — LETTER
Letter by Nazanin Gonsalves CNP at      Author: Nazanin Gonsalves CNP Service: -- Author Type: --    Filed:  Encounter Date: 3/9/2020 Status: (Other)         Patient: Mitzi De Santiago   MR Number: [id number]   YOB: 1934   Date of Visit: 3/9/2020     [facility]    Facility:   [facility] [[id number]]   Code Status: FULL CODE      CHIEF COMPLAINT/REASON FOR VISIT:  Chief Complaint   Patient presents with   ? Problem Visit     left leg clllulitis       HISTORY:      HPI: Mitzi is a 85 y.o. female undergoing physical and occupational therapy at [facility] transitional care unit.  She is with past medical history of hypertension, hematoma, chronic kidney disease stage II-III and osteoarthritis. TodayDebility PT OT therapy to evaluate for lymph wraps who presented to the emergency department for evaluation of right hip pain after mechanical fall.  She underwent a reduction under anesthesia.  She is with a history of a total right hip arthroplasty the femoral prosthesis is dislocated superiorly and no fracture.  She also had a hypertension crisis during her hospitalization per hospital report probably secondary to pain she did receive IV hydralazine.    Today she is seen for reports of  left medial leg redness. She was noted to have redness inner distal thigh. She reports it started over the weekend. A RLE US and Uric acid were both negative.  She was started on Keflex three times a day per renal dosing. She was recently treated for a positive UTI with accompanying fevers.   Her culture was >100,00 E Coli and she was treated with bactrim DS x 3 days. She denies  pain with urination. Her LS were clear and she had no cough .    She denies chest pain or shortness of breath.  She is moving her bowels.  She is alert and oriented x4.   She denies pain  She is no longer wearing the abductor brace but is with hip precautions.  She is ambulating with a walker. Today  she is afebrile at 98.3. Labs pending She will discharge in the middle of the week.         Past Medical History:   Diagnosis Date   ? Basal cell carcinoma 9/2000    forehead   ? Bunion    ? Cellulitis    ? Fatty liver    ? Hammer toe    ? Hypertension    ? Open wound(s) (multiple) of unspecified site(s), without mention of complication    ? Papilloma of breast              Family History   Problem Relation Age of Onset   ? No Medical Problems Mother    ? Stroke Father    ? No Medical Problems Sister    ? No Medical Problems Daughter    ? No Medical Problems Maternal Grandmother    ? No Medical Problems Maternal Grandfather    ? No Medical Problems Paternal Grandmother    ? No Medical Problems Paternal Grandfather    ? No Medical Problems Maternal Aunt    ? No Medical Problems Paternal Aunt    ? BRCA 1/2 Neg Hx    ? Breast cancer Neg Hx    ? Cancer Neg Hx    ? Colon cancer Neg Hx    ? Endometrial cancer Neg Hx    ? Ovarian cancer Neg Hx      Social History     Socioeconomic History   ? Marital status:      Spouse name: Not on file   ? Number of children: Not on file   ? Years of education: Not on file   ? Highest education level: Not on file   Occupational History   ? Not on file   Social Needs   ? Financial resource strain: Not on file   ? Food insecurity     Worry: Not on file     Inability: Not on file   ? Transportation needs     Medical: Not on file     Non-medical: Not on file   Tobacco Use   ? Smoking status: Never Smoker   ? Smokeless tobacco: Never Used   Substance and Sexual Activity   ? Alcohol use: No   ? Drug use: Never   ? Sexual activity: Not on file   Lifestyle   ? Physical activity     Days per week: Not on file     Minutes per session: Not on file   ? Stress: Not on file   Relationships   ? Social connections     Talks on phone: Not on file     Gets together: Not on file     Attends Rastafari service: Not on file     Active member of club or organization: Not on file     Attends meetings of  clubs or organizations: Not on file     Relationship status: Not on file   ? Intimate partner violence     Fear of current or ex partner: Not on file     Emotionally abused: Not on file     Physically abused: Not on file     Forced sexual activity: Not on file   Other Topics Concern   ? Not on file   Social History Narrative    Living at home alone.  and has 1 daughter in [place].          Review of Systems   Constitutional: Positive for activity change and fatigue. Negative for appetite change and fever.   HENT: Negative for congestion.    Respiratory: Negative for cough, shortness of breath and wheezing.    Cardiovascular: Negative for chest pain and leg swelling.   Gastrointestinal: Negative for abdominal distention, abdominal pain, constipation, diarrhea and nausea.        Pt with an umbilical hernia    Genitourinary: Negative for dysuria.   Musculoskeletal: Positive for arthralgias. Negative for back pain.   Skin: Positive for wound. Negative for color change.   Neurological: Negative for dizziness.   Psychiatric/Behavioral: Negative for agitation, behavioral problems and confusion.       Vitals:    03/09/20 0811   BP: 135/64   Pulse: 76   SpO2: (!) 18%   Weight: 162 lb 12.8 oz (73.8 kg)       Physical Exam  Constitutional:       Appearance: She is well-developed.      Comments: Pleasant woman in no acute distress   HENT:      Head: Normocephalic.   Eyes:      Conjunctiva/sclera: Conjunctivae normal.   Neck:      Musculoskeletal: Normal range of motion.   Cardiovascular:      Rate and Rhythm: Normal rate and regular rhythm.      Heart sounds: Normal heart sounds. No murmur.   Pulmonary:      Effort: No respiratory distress.      Breath sounds: Normal breath sounds. No wheezing or rales.   Abdominal:      General: Bowel sounds are normal. There is no distension.      Palpations: Abdomen is soft.      Tenderness: There is no abdominal tenderness.   Musculoskeletal: Normal range of motion.      Comments:    Decreased range of motion left upper extremity she reports it has been this way for years.   Skin:     General: Skin is warm.      Comments: Coccyx wound being followed by the wound care team healing well per staff.     Redness and warmth right medial  distal thigh      Neurological:      Mental Status: She is alert and oriented to person, place, and time.   Psychiatric:         Behavior: Behavior normal.           LABS:   Recent Results (from the past 240 hour(s))   Urinalysis   Result Value Ref Range    Color, UA Yellow Colorless, Yellow, Straw, Light Yellow    Clarity, UA Cloudy (!) Clear    Glucose, UA Negative Negative    Bilirubin, UA Negative Negative    Ketones, UA Negative Negative    Specific Gravity, UA 1.016 1.001 - 1.030    Blood, UA Trace (!) Negative    pH, UA 5.0 4.5 - 8.0    Protein, UA 30 mg/dL (!) Negative mg/dL    Urobilinogen, UA <2.0 E.U./dL <2.0 E.U./dL, 2.0 E.U./dL    Nitrite, UA Negative Negative    Leukocytes, UA Large (!) Negative    Bacteria, UA Many (!) None Seen hpf    RBC, UA 0-2 None Seen, 0-2 hpf    WBC, UA >100 (!) None Seen, 0-5 hpf    Squam Epithel, UA 25-50 (!) None Seen, 0-5 lpf    WBC Clumps Present (!) None Seen    Mucus, UA Few (!) None Seen lpf    Hyaline Casts, UA 0-5 0-5, None Seen lpf   Culture, Urine    Specimen: Urine   Result Value Ref Range    Culture >100,000 col/ml Escherichia coli (!)        Susceptibility    Escherichia coli - MAR     Amoxicillin + Clavulanate <=4/2 Sensitive      Ampicillin <=4 Sensitive      Cefazolin <=1 Sensitive      Cefepime <=1 Sensitive      Ceftriaxone <=1 Sensitive      Ciprofloxacin <=0.5 Sensitive      Gentamicin <=2 Sensitive      Levofloxacin <=1 Sensitive      Meropenem <=0.25 Sensitive      Nitrofurantoin <=16 Sensitive      Tetracycline <=2 Sensitive      Tobramycin <=2 Sensitive      Trimethoprim + Sulfamethoxazole <=0.5 Sensitive    Basic Metabolic Panel   Result Value Ref Range    Sodium 135 (L) 136 - 145 mmol/L    Potassium  3.8 3.5 - 5.0 mmol/L    Chloride 101 98 - 107 mmol/L    CO2 24 22 - 31 mmol/L    Anion Gap, Calculation 10 5 - 18 mmol/L    Glucose 121 70 - 125 mg/dL    Calcium 9.8 8.5 - 10.5 mg/dL    BUN 38 (H) 8 - 28 mg/dL    Creatinine 1.18 (H) 0.60 - 1.10 mg/dL    GFR MDRD Af Amer 53 (L) >60 mL/min/1.73m2    GFR MDRD Non Af Amer 44 (L) >60 mL/min/1.73m2   CK Total   Result Value Ref Range    CK, Total 26 (L) 30 - 190 U/L   HM2(CBC w/o Differential)   Result Value Ref Range    WBC 13.3 (H) 4.0 - 11.0 thou/uL    RBC 2.87 (L) 3.80 - 5.40 mill/uL    Hemoglobin 10.2 (L) 12.0 - 16.0 g/dL    Hematocrit 30.7 (L) 35.0 - 47.0 %     (H) 80 - 100 fL    MCH 35.5 (H) 27.0 - 34.0 pg    MCHC 33.2 32.0 - 36.0 g/dL    RDW 15.8 (H) 11.0 - 14.5 %    Platelets 145 140 - 440 thou/uL    MPV 10.5 8.5 - 12.5 fL   Uric Acid   Result Value Ref Range    Uric Acid 6.8 2.0 - 7.5 mg/dL       ASSESSMENT:      ICD-10-CM    1. Cellulitis of right lower extremity  L03.115    2. Posterior dislocation of right hip, initial encounter (H)  S73.014A    3. Pain management  R52    4. Debility  R53.81        PLAN:      Cellulitis right medial thigh- Keflex three times a day x 7 days    UTI  Completed Bactrim DS two times a day x 3 days. U/A 3/9/20 negative     hypertension continue atenolol and hydrochlorothiazide blood pressure stable    Coccyx wound continue dressing changes she is being followed by the wound care team    Pain management patient reports pain controlled on scheduled Tylenol    Anticoagulation currently on 81 mg twice daily    Reduction of a right dislocated hip patient underwent reduction in the OR under anesthesia failed attempt in the ER, She no longer needs to wear the abductor brace. Ambulating with a walker but does have hip precautions.     Debility PT OT    Rhabdomyolysis-elevated CK on 1/6/2020 and now low at 26.     Electronically signed by: Nazanin Gonsalves CNP

## 2021-06-20 NOTE — LETTER
"Letter by Nazanin Gonsalves CNP at      Author: Nazanin Gonsalves CNP Service: -- Author Type: --    Filed:  Encounter Date: 2/11/2020 Status: (Other)         Patient: Mitzi De Santiago   MR Number: [id number]   YOB: 1934   Date of Visit: 2/11/2020     [facility]    Facility:   [facility] [[id number]]   Code Status: FULL CODE      CHIEF COMPLAINT/REASON FOR VISIT:  Chief Complaint   Patient presents with   ? Review Of Multiple Medical Conditions       HISTORY:      HPI: Mitzi is a 85 y.o. female undergoing physical and occupational therapy at [facility] transitional care unit.  She is with past medical history of hypertension, hematoma, chronic kidney disease stage II-III and osteoarthritis. TodayDebility PT OT therapy to evaluate for lymph wraps who presented to the emergency department for evaluation of right hip pain after mechanical fall.  She underwent a reduction under anesthesia.  She is with a history of a total right hip arthroplasty the femoral prosthesis is dislocated superiorly and no fracture.  She also had a hypertension crisis during her hospitalization per hospital report probably secondary to pain she did receive IV hydralazine.    Today she is seen as a routine visit to review multiple medical issues.  She denies chest pain or shortness of breath.  She is moving her bowels and has  no issues with urination.  She is alert and oriented x4.   She denies pain  She is no longer wearing the abductor brace. She reports she is standing in therapy and walking\"a little\".     Her CK is WNL at  48.    Denies any congestion or cough. Staff reported her coccyx wound is almost healed. She has no depth or drainage.      Past Medical History:   Diagnosis Date   ? Basal cell carcinoma 9/2000    forehead   ? Bunion    ? Cellulitis    ? Fatty liver    ? Hammer toe    ? Hypertension    ? Open wound(s) (multiple) of unspecified site(s), without mention " of complication    ? Papilloma of breast              Family History   Problem Relation Age of Onset   ? No Medical Problems Mother    ? Stroke Father    ? No Medical Problems Sister    ? No Medical Problems Daughter    ? No Medical Problems Maternal Grandmother    ? No Medical Problems Maternal Grandfather    ? No Medical Problems Paternal Grandmother    ? No Medical Problems Paternal Grandfather    ? No Medical Problems Maternal Aunt    ? No Medical Problems Paternal Aunt    ? BRCA 1/2 Neg Hx    ? Breast cancer Neg Hx    ? Cancer Neg Hx    ? Colon cancer Neg Hx    ? Endometrial cancer Neg Hx    ? Ovarian cancer Neg Hx      Social History     Socioeconomic History   ? Marital status:      Spouse name: Not on file   ? Number of children: Not on file   ? Years of education: Not on file   ? Highest education level: Not on file   Occupational History   ? Not on file   Social Needs   ? Financial resource strain: Not on file   ? Food insecurity:     Worry: Not on file     Inability: Not on file   ? Transportation needs:     Medical: Not on file     Non-medical: Not on file   Tobacco Use   ? Smoking status: Never Smoker   ? Smokeless tobacco: Never Used   Substance and Sexual Activity   ? Alcohol use: No   ? Drug use: Never   ? Sexual activity: Not on file   Lifestyle   ? Physical activity:     Days per week: Not on file     Minutes per session: Not on file   ? Stress: Not on file   Relationships   ? Social connections:     Talks on phone: Not on file     Gets together: Not on file     Attends Church service: Not on file     Active member of club or organization: Not on file     Attends meetings of clubs or organizations: Not on file     Relationship status: Not on file   ? Intimate partner violence:     Fear of current or ex partner: Not on file     Emotionally abused: Not on file     Physically abused: Not on file     Forced sexual activity: Not on file   Other Topics Concern   ? Not on file   Social History  Narrative    Living at home alone.  and has 1 daughter in [place].          Review of Systems   Constitutional: Positive for activity change and fatigue. Negative for appetite change and fever.   HENT: Negative for congestion.    Respiratory: Negative for cough, shortness of breath and wheezing.    Cardiovascular: Negative for chest pain and leg swelling.   Gastrointestinal: Negative for abdominal distention, abdominal pain, constipation, diarrhea and nausea.   Genitourinary: Negative for dysuria.   Musculoskeletal: Positive for arthralgias. Negative for back pain.   Skin: Positive for wound. Negative for color change.   Neurological: Negative for dizziness.   Psychiatric/Behavioral: Negative for agitation, behavioral problems and confusion.       Vitals:    02/11/20 0859   BP: 148/62   Pulse: (!) 51   Resp: 16   Temp: 96.9  F (36.1  C)   SpO2: 96%   Weight: 159 lb (72.1 kg)       Physical Exam  Constitutional:       Appearance: She is well-developed.      Comments: Pleasant woman in no acute distress   HENT:      Head: Normocephalic.   Eyes:      Conjunctiva/sclera: Conjunctivae normal.   Neck:      Musculoskeletal: Normal range of motion.   Cardiovascular:      Rate and Rhythm: Normal rate and regular rhythm.      Heart sounds: Normal heart sounds. No murmur.   Pulmonary:      Effort: No respiratory distress.      Breath sounds: Normal breath sounds. No wheezing or rales.   Abdominal:      General: Bowel sounds are normal. There is no distension.      Palpations: Abdomen is soft.      Tenderness: There is no abdominal tenderness.   Musculoskeletal: Normal range of motion.      Comments:   Decreased range of motion left upper extremity she reports it has been this way for years.   Skin:     General: Skin is warm.      Comments: Coccyx wound being followed by the wound care team healing well per staff.      Neurological:      Mental Status: She is alert and oriented to person, place, and time.   Psychiatric:          Behavior: Behavior normal.           LABS:   No results found for this or any previous visit (from the past 240 hour(s)).    ASSESSMENT:      ICD-10-CM    1. Posterior dislocation of right hip, initial encounter (H) S73.014A    2. Pressure injury of sacral region, stage 3 (H) L89.153    3. Pain management R52        PLAN:      Hypertension continue atenolol and hydrochlorothiazide blood pressure stable    Coccyx wound continue dressing changes she is being followed by the wound care team    Pain management patient reports pain controlled on scheduled Tylenol    Anticoagulation currently on 81 mg twice daily    Reduction of a right dislocated hip patient underwent reduction in the OR under anesthesia failed attempt in the ER, She no longer needs to wear the abductor brace.     Debility PT OT    Rhabdomyolysis-elevated CK on 1/6/2020 and now within normal limits at 48    Electronically signed by: Nazanin Gonsalves CNP

## 2021-06-20 NOTE — LETTER
Letter by Nazanin Gonsalves CNP at      Author: Nazanin Gonsalves CNP Service: -- Author Type: --    Filed:  Encounter Date: 1/8/2020 Status: Signed         Patient: Mitzi De Santiago   MR Number: [id number]   YOB: 1934   Date of Visit: 1/8/2020     [facility]    Facility[place]   [facility] [place] [[id number]]   Code Status: FULL CODE      CHIEF COMPLAINT/REASON FOR VISIT:  Chief Complaint   Patient presents with   ? Review Of Multiple Medical Conditions       HISTORY:      HPI: Mitzi is a 85 y.o. female undergoing physical and occupational therapy at [facility] transitional care unit.  She is with past medical history of hypertension, hematoma, chronic kidney disease stage II-III and osteoarthritis. Today[facility] PT OT therapy to evaluate for lymph wraps who presented to the emergency department for evaluation of right hip pain after mechanical fall.  She underwent a reduction under anesthesia.  She is with a history of a total right hip arthroplasty the femoral prosthesis is dislocated superiorly and no fracture.  She also had a hypertension crisis during her hospitalization per hospital report probably secondary to pain she did receive IV hydralazine.    Today she is seen as a first routine visit to review multiple medical issues.  She denies chest pain or shortness of breath.  She reports positive bowel movement and no issues with urination.  She is alert and oriented x4.  She does report that her pain is controlled with current medications but does get worse when she starts to move.  There was no incisions to be found it appears her dislocation was reduced in the OR to anesthesia.  She does have a coccyx wound and dressing change orders were changed today after being evaluated by the wound nurse.  She does have a hinged hip brace for when she is out of bed.    Past Medical History:   Diagnosis Date   ? Basal cell carcinoma 9/2000    forehead   ? Bunion    ?  Cellulitis    ? Fatty liver    ? Hammer toe    ? Hypertension    ? Open wound(s) (multiple) of unspecified site(s), without mention of complication    ? Papilloma of breast              Family History   Problem Relation Age of Onset   ? No Medical Problems Mother    ? Stroke Father    ? No Medical Problems Sister    ? No Medical Problems Daughter    ? No Medical Problems Maternal Grandmother    ? No Medical Problems Maternal Grandfather    ? No Medical Problems Paternal Grandmother    ? No Medical Problems Paternal Grandfather    ? No Medical Problems Maternal Aunt    ? No Medical Problems Paternal Aunt    ? BRCA 1/2 Neg Hx    ? Breast cancer Neg Hx    ? Cancer Neg Hx    ? Colon cancer Neg Hx    ? Endometrial cancer Neg Hx    ? Ovarian cancer Neg Hx      Social History     Socioeconomic History   ? Marital status:      Spouse name: Not on file   ? Number of children: Not on file   ? Years of education: Not on file   ? Highest education level: Not on file   Occupational History   ? Not on file   Social Needs   ? Financial resource strain: Not on file   ? Food insecurity:     Worry: Not on file     Inability: Not on file   ? Transportation needs:     Medical: Not on file     Non-medical: Not on file   Tobacco Use   ? Smoking status: Never Smoker   ? Smokeless tobacco: Never Used   Substance and Sexual Activity   ? Alcohol use: No   ? Drug use: Never   ? Sexual activity: Not on file   Lifestyle   ? Physical activity:     Days per week: Not on file     Minutes per session: Not on file   ? Stress: Not on file   Relationships   ? Social connections:     Talks on phone: Not on file     Gets together: Not on file     Attends [employer] service: Not on file     Active member of club or organization: Not on file     Attends meetings of clubs or organizations: Not on file     Relationship status: Not on file   ? Intimate partner violence:     Fear of current or ex partner: Not on file     Emotionally abused: Not on file      Physically abused: Not on file     Forced sexual activity: Not on file   Other Topics Concern   ? Not on file   Social History Narrative    Living at home alone.  and has 1 daughter in [place].          Review of Systems   Constitutional: Positive for activity change and fatigue. Negative for appetite change and fever.   HENT: Negative for congestion.    Respiratory: Negative for cough, shortness of breath and wheezing.    Cardiovascular: Negative for chest pain and leg swelling.   Gastrointestinal: Negative for abdominal distention, abdominal pain, constipation, diarrhea and nausea.   Genitourinary: Negative for dysuria.   Musculoskeletal: Positive for arthralgias. Negative for back pain.   Skin: Positive for wound. Negative for color change.   Neurological: Negative for dizziness.   Psychiatric/Behavioral: Negative for agitation, behavioral problems and confusion.       Vitals:    01/08/20 1226   BP: 150/62   Pulse: 69   Resp: 18   Temp: 98.5  F (36.9  C)   SpO2: 96%       Physical Exam  Constitutional:       Appearance: She is well-developed.      Comments: Pleasant woman in no acute distress   HENT:      Head: Normocephalic.   Eyes:      Conjunctiva/sclera: Conjunctivae normal.   Neck:      Musculoskeletal: Normal range of motion.   Cardiovascular:      Rate and Rhythm: Normal rate and regular rhythm.      Heart sounds: Normal heart sounds. No murmur.   Pulmonary:      Effort: No respiratory distress.      Breath sounds: Normal breath sounds. No wheezing or rales.   Abdominal:      General: Bowel sounds are normal. There is no distension.      Palpations: Abdomen is soft.      Tenderness: There is no abdominal tenderness.   Musculoskeletal: Normal range of motion.      Comments: A hinged hip brace  Decreased range of motion left upper extremity she reports it has been this way for years.   Skin:     General: Skin is warm.      Comments: Coccyx wound being followed by the wound care team  Rash in her  abdominal folds nystatin powder ordered   Neurological:      Mental Status: She is alert and oriented to person, place, and time.   Psychiatric:         Behavior: Behavior normal.           LABS:   Recent Results (from the past 240 hour(s))   Basic Metabolic Panel   Result Value Ref Range    Sodium 140 136 - 145 mmol/L    Potassium 3.9 3.5 - 5.0 mmol/L    Chloride 103 98 - 107 mmol/L    CO2 21 (L) 22 - 31 mmol/L    Anion Gap, Calculation 16 5 - 18 mmol/L    Glucose 154 (H) 70 - 125 mg/dL    Calcium 9.6 8.5 - 10.5 mg/dL    BUN 17 8 - 28 mg/dL    Creatinine 1.34 (H) 0.60 - 1.10 mg/dL    GFR MDRD Af Amer 46 (L) >60 mL/min/1.73m2    GFR MDRD Non Af Amer 38 (L) >60 mL/min/1.73m2   Hepatic Profile   Result Value Ref Range    Bilirubin, Total 1.0 0.0 - 1.0 mg/dL    Bilirubin, Direct 0.4 <=0.5 mg/dL    Protein, Total 7.4 6.0 - 8.0 g/dL    Albumin 3.2 (L) 3.5 - 5.0 g/dL    Alkaline Phosphatase 109 45 - 120 U/L    AST 60 (H) 0 - 40 U/L    ALT 22 0 - 45 U/L   HM2 (CBC W/O DIFF)   Result Value Ref Range    WBC 9.7 4.0 - 11.0 thou/uL    RBC 3.09 (L) 3.80 - 5.40 mill/uL    Hemoglobin 10.8 (L) 12.0 - 16.0 g/dL    Hematocrit 32.1 (L) 35.0 - 47.0 %     (H) 80 - 100 fL    MCH 35.0 (H) 27.0 - 34.0 pg    MCHC 33.6 32.0 - 36.0 g/dL    RDW 14.3 11.0 - 14.5 %    Platelets 140 140 - 440 thou/uL    MPV 9.4 8.5 - 12.5 fL   Magnesium   Result Value Ref Range    Magnesium 1.7 (L) 1.8 - 2.6 mg/dL   CK   Result Value Ref Range    CK, Total 2,018 (HH) 30 - 190 U/L   C-reactive protein   Result Value Ref Range    CRP 3.9 (H) 0.0 - 0.8 mg/dL   Thyroid Cascade   Result Value Ref Range    TSH 1.52 0.30 - 5.00 uIU/mL   Urinalysis-UC if Indicated   Result Value Ref Range    Color, UA Yellow Colorless, Yellow, Straw, Light Yellow    Clarity, UA Clear Clear    Glucose, UA Negative Negative    Bilirubin, UA Negative Negative    Ketones, UA Negative Negative, 60 mg/dL    Specific Gravity, UA 1.013 1.001 - 1.030    Blood, UA Large (!) Negative    pH,  UA 6.0 4.5 - 8.0    Protein, UA 30 mg/dL (!) Negative mg/dL    Urobilinogen, UA <2.0 E.U./dL <2.0 E.U./dL, 2.0 E.U./dL    Nitrite, UA Negative Negative    Leukocytes, UA Negative Negative    Bacteria, UA None Seen None Seen hpf    RBC, UA 0-2 None Seen, 0-2 hpf    WBC, UA 0-5 None Seen, 0-5 hpf    Squam Epithel, UA 25-50 (!) None Seen, 0-5 lpf    Hyaline Casts, UA 0-5 0-5, None Seen lpf   Pregnancy, urine   Result Value Ref Range    Pregnancy Test, Urine Negative Negative   Comprehensive metabolic panel   Result Value Ref Range    Sodium 140 136 - 145 mmol/L    Potassium 3.9 3.5 - 5.0 mmol/L    Chloride 109 (H) 98 - 107 mmol/L    CO2 23 22 - 31 mmol/L    Anion Gap, Calculation 8 5 - 18 mmol/L    Glucose 107 70 - 125 mg/dL    BUN 22 8 - 28 mg/dL    Creatinine 1.16 (H) 0.60 - 1.10 mg/dL    GFR MDRD Af Amer 54 (L) >60 mL/min/1.73m2    GFR MDRD Non Af Amer 44 (L) >60 mL/min/1.73m2    Bilirubin, Total 1.0 0.0 - 1.0 mg/dL    Calcium 8.0 (L) 8.5 - 10.5 mg/dL    Protein, Total 5.3 (L) 6.0 - 8.0 g/dL    Albumin 2.3 (L) 3.5 - 5.0 g/dL    Alkaline Phosphatase 69 45 - 120 U/L    AST 87 (H) 0 - 40 U/L    ALT 31 0 - 45 U/L   CK Total   Result Value Ref Range    CK, Total 2,927 (HH) 30 - 190 U/L   HM1 (CBC with Diff)   Result Value Ref Range    WBC 4.8 4.0 - 11.0 thou/uL    RBC 2.10 (L) 3.80 - 5.40 mill/uL    Hemoglobin 7.5 (L) 12.0 - 16.0 g/dL    Hematocrit 22.5 (L) 35.0 - 47.0 %     (H) 80 - 100 fL    MCH 35.7 (H) 27.0 - 34.0 pg    MCHC 33.3 32.0 - 36.0 g/dL    RDW 14.5 11.0 - 14.5 %    Platelets 100 (L) 140 - 440 thou/uL    MPV 9.9 8.5 - 12.5 fL    Neutrophils % 77 (H) 50 - 70 %    Lymphocytes % 15 (L) 20 - 40 %    Monocytes % 7 2 - 10 %    Eosinophils % 0 0 - 6 %    Basophils % 0 0 - 2 %    Neutrophils Absolute 3.7 2.0 - 7.7 thou/uL    Lymphocytes Absolute 0.7 (L) 0.8 - 4.4 thou/uL    Monocytes Absolute 0.3 0.0 - 0.9 thou/uL    Eosinophils Absolute 0.0 0.0 - 0.4 thou/uL    Basophils Absolute 0.0 0.0 - 0.2 thou/uL    Basic Metabolic Panel   Result Value Ref Range    Sodium 139 136 - 145 mmol/L    Potassium 3.5 3.5 - 5.0 mmol/L    Chloride 110 (H) 98 - 107 mmol/L    CO2 24 22 - 31 mmol/L    Anion Gap, Calculation 5 5 - 18 mmol/L    Glucose 90 70 - 125 mg/dL    Calcium 7.6 (L) 8.5 - 10.5 mg/dL    BUN 21 8 - 28 mg/dL    Creatinine 0.95 0.60 - 1.10 mg/dL    GFR MDRD Af Amer >60 >60 mL/min/1.73m2    GFR MDRD Non Af Amer 56 (L) >60 mL/min/1.73m2   CK Total   Result Value Ref Range    CK, Total 2,295 (HH) 30 - 190 U/L   HM2(CBC w/o Differential)   Result Value Ref Range    WBC 4.7 4.0 - 11.0 thou/uL    RBC 2.02 (L) 3.80 - 5.40 mill/uL    Hemoglobin 7.0 (L) 12.0 - 16.0 g/dL    Hematocrit 21.4 (L) 35.0 - 47.0 %     (H) 80 - 100 fL    MCH 34.7 (H) 27.0 - 34.0 pg    MCHC 32.7 32.0 - 36.0 g/dL    RDW 14.8 (H) 11.0 - 14.5 %    Platelets 104 (L) 140 - 440 thou/uL    MPV 9.8 8.5 - 12.5 fL   Type and Screen   Result Value Ref Range    ABORh O POS     Antibody Screen Negative Negative   Magnesium   Result Value Ref Range    Magnesium 1.9 1.8 - 2.6 mg/dL   Hemoglobin   Result Value Ref Range    Hemoglobin 9.6 (L) 12.0 - 16.0 g/dL   Crossmatch   Result Value Ref Range    Crossmatch Compatible     Blood Expiration Date [id number]     Unit Type O Pos     Unit Number [id number]     Status Transfused     Component Red Blood Cells     PRODUCT CODE I5632K78     Issue Date and Time [id number]     Blood Type 5100     CODING SYSTEM [id number]    Crossmatch   Result Value Ref Range    Crossmatch Compatible     Blood Expiration Date [id number]     Unit Type O Pos     Unit Number [id number]     Status Transfused     Component Red Blood Cells     PRODUCT CODE L9757E91     Issue Date and Time [id number]     Blood Type 5100     CODING SYSTEM [id number]        ASSESSMENT:      ICD-10-CM    1. Accelerated hypertension I10    2. Open wound(s) (multiple) of unspecified site(s), without mention of complication T14.8XXA    3. Pain management  R52        PLAN:    Hypertension continue atenolol and hydrochlorothiazide blood pressure stable    Coccyx wound previous treatments have been discontinued and patient will have a wet to dry dressing change daily.  There was no necrosis noted patient being followed by the wound care team    Pain management patient reports pain controlled on scheduled Tylenol    Anticoagulation currently on 81 mg twice daily    Reduction of a right dislocated hip patient underwent reduction in the OR under anesthesia failed attempt in the ER, patient with a hinged hip brace    [facility] PT OT    Rhabdomyolysis-elevated CK on 1/6/2020 monitor CK and BMP levels    Electronically signed by: Nazanin Gonsalves CNP

## 2021-06-20 NOTE — LETTER
Letter by Danelle Pandey CNP at      Author: Danelle Pandey CNP Service: -- Author Type: --    Filed:  Encounter Date: 5/26/2020 Status: (Other)         Patient: Mitzi De Santiago   MR Number: [id number]   YOB: 1934   Date of Visit: 5/26/2020     Code Status:  DNR  Visit Type: Discharge Summary     [facility] [[id number][facility]          PCP:  Jerry Araujo MD  [phone]       Admission Date to our Facility: 5/10/2020  discharge Date from our Facility: 5/27/2020    Discharge Diagnosis:    1. Closed fracture of distal end of left fibula with routine healing, unspecified fracture morphology, subsequent encounter     2. Pain management     3. Pressure ulcer of coccygeal region, stage 3 (H)     4. Open wound(s) (multiple) of unspecified site(s), without mention of complication     5. Essential hypertension     6. Weight loss          History of Present Illness: Mitzi De Santiago is a 85 y.o. female with a past medical history for CKD, chronic anemia, mild cognitive impairment, hypertension, osteoarthritis, multiple chronic wounds. She had been hospitalized in January 2020 in which she also had a fall resulting in dislocation of her prosthetic hip and was discharged home from TCU.  She did have increased right distal thigh swelling and drainage from her incision.  She has since had an open draining wound.    She did sustain a fall at home and was readmitted to the hospital May 3 for distal fibula fracture. She was taken to the OR for closed reduction.   She was evaluated by orthopedics and they recommended weightbearing as tolerated with a cam boot.  Her pain was controlled with Tylenol and tramadol.  Her hemoglobin was stable at 8.6.  She did have significant elevation of her CRP and it was felt that this was related to cellulitis/infection/possible ruptured or infected hematoma.  She was treated with IV Rocephin and then discharged on clindamycin through 5/8. Doppler of right leg was negative  for DVT.  She also was treated with ceftriaxone for 5 days for an E. coli UTI.    Skilled Nursing Facility Course:    While at the TCU she did have issues with persistent diarrhea however tested negative for C. difficile.  She was taking arginaid for wound healing however she started to refuse this feeling this had caused her loose stools.  After stopping the arginaid, she did show great improvement in her stooling.    Her left lower extremity did improve and orthopedics DC'd cam boot and weight restrictions.  She does wear an ankle support when up.  Pain is well managed and all pain medications have been discontinued.  She may likely need some compression in the future to improve edema however edema is visibly stable at this time.    During her stay she did have issues with bradycardia however was asymptomatic.  She is on atenolol for hypertension along with hydrochlorothiazide and lisinopril.  I did decrease her atenolol to 25 mg daily and her blood pressures maintain stability and her heart rate is now in good range.    She did have some weight loss during her stay however this seems to be rebounding as she has gained 4 pounds from a couple weeks ago.    Discharge Medications:   Current Outpatient Medications   Medication Sig Dispense Refill   ? acetaminophen (TYLENOL) 325 MG tablet Take by mouth every 4 (four) hours as needed for pain.     ? ascorbic acid, vitamin C, (VITAMIN C) 500 MG tablet Take 500 mg by mouth daily.     ? aspirin 81 mg chewable tablet Chew 1 tablet (81 mg total) 2 (two) times a day.  0   ? atenolol (TENORMIN) 50 MG tablet Take 25 mg by mouth daily.      ? CALCIUM CARBONATE (CALCIUM 500 ORAL) Take 1 tablet by mouth daily.      ? ferrous sulfate 325 (65 FE) MG tablet Take 1 tablet by mouth daily with breakfast.     ? glucosamine sulfate 500 mg cap Take 500 mg by mouth daily.     ? hydrochlorothiazide (HYDRODIURIL) 25 MG tablet Take 25 mg by mouth daily.     ? lisinopriL (PRINIVIL,ZESTRIL) 10  MG tablet Take 1 tablet (10 mg total) by mouth daily. 30 tablet 0   ? menthol (BIOFREEZE, MENTHOL,) 4 % Gel Apply 1 application topically 3 (three) times a day as needed. (apply to knees and shoulders)     ? nystatin (MYCOSTATIN) powder Apply 1 application topically 2 (two) times a day as needed.      ? omega-3 fatty acids 1,000 mg cap Take 1,000 mg by mouth daily.      ? polyethylene glycol (MIRALAX) 17 gram packet Take 1 packet (17 g total) by mouth daily as needed (constipation).  0   ? potassium chloride (KLOR-CON) 10 MEQ CR tablet Take 10 mEq by mouth daily.       No current facility-administered medications for this visit.        For most current and accurate medication list, please contact the skilled nursing facility that this patient visit took place at.      Discharge Plan: Patient is stable to discharge to her new assisted living facility.  She will need to follow-up with her primary provider regarding maintenance and ongoing care.    Review of Systems   Patient denies fever, chills, headache, lightheadedness, dizziness, rhinorrhea, cough, congestion, shortness of breath, chest pain, palpitations, abdominal pain, n/v, diarrhea, constipation, change in appetite, dysuria, frequency, burning or pain with urination.  Other than stated in HPI all other review of systems is negative.         Physical Exam   Complete physical exam was not completed in order to maintain social distancing during the COVID crisis.    Head is normocephalic and atraumatic, EOM intact.  Respiratory effort is normal, no visible edema.  Has wounds on her coccyx and right inner thigh that has current wound care.  Mood is stable.    Vitals: /66, heart rate 65, respirations 16, temp 98.4.    Labs:  No results found for this or any previous visit (from the past 240 hour(s)).      Assessment:  1. Closed fracture of distal end of left fibula with routine healing, unspecified fracture morphology, subsequent encounter     2. Pain  management     3. Pressure ulcer of coccygeal region, stage 3 (H)     4. Open wound(s) (multiple) of unspecified site(s), without mention of complication     5. Essential hypertension     6. Weight loss         MEDICAL EQUIPMENT NEEDS:  NA    DISCHARGE PLAN/FACE TO FACE:  I certify that services are/were furnished while this patient was under the care of a physician and that a physician or an allowed non-physician practitioner (NPP), had a face-to-face encounter that meets the physician face-to-face encounter requirements. The encounter was in whole, or in part, related to the primary reason for home health. The patient is confined to his/her home and needs intermittent skilled nursing, physical therapy, speech-language pathology, or the continued need for occupational therapy. A plan of care has been established by a physician and is periodically reviewed by a physician.    I certify that this patient is under my care and that I, or a nurse practitioner or physician's assistant working with me, had a face-to-face encounter that meets the physician face-to-face encounter requirements with this patient.   Date of Face-to-Face Encounter: 5/26/2020    I certify that, based on my findings, the following services are medically necessary home health services: RN, PT/OT    My clinical findings support the need for the above skilled services because: RN for wound care and medication management, PT/OT for ongoing endurance and strengthening training.    This patient is homebound because: Patient requires maximum effort in order to get out into the community on a regular basis.    The patient is, or has been, under my care and I have initiated the establishment of the plan of care. This patient will be followed by a physician who will periodically review the plan of care.      Electronically signed by: Danelle Pandey CNP

## 2021-06-20 NOTE — LETTER
Letter by Mor Flores MD at      Author: Mor Flores MD Service: -- Author Type: --    Filed:  Encounter Date: 4/17/2020 Status: (Other)         Patient: Mitzi De Santiago   MR Number: [id number]   YOB: 1934   Date of Visit: 4/17/2020      [facility]    Facility:  [facility] [[id number]]    Code Status:  DNR    Chief Complaint   Patient presents with   ? H & P   :                    Patient Active Problem List   Diagnosis   ? Accelerated essential hypertension   ? Basal cell carcinoma   ? Fatty liver   ? Bunion   ? Hammer toe   ? Papilloma of breast   ? Cellulitis   ? Open wound(s) (multiple) of unspecified site(s), without mention of complication   ? Leucocytoclastic vasculitis (H)   ? Knee osteoarthritis   ? Impingement syndrome of right shoulder   ? Posterior dislocation of right hip, initial encounter (H)   ? Traumatic rhabdomyolysis, subsequent encounter   ? Status post total replacement of right hip   ? Acute blood loss as cause of postoperative anemia   ? Pressure injury of sacral region, stage 3 (H)   ? Generalized weakness   ? Fall against object   ? Electrolyte disturbance       History:  Mitzi De Santiago  is aan 85 year old female with history of CKD 3, chronic anemia, mild cognitive impairment, hypertension, osteoarthritis, stage III coccyx wound seen for admission to TCU on 4/17/2020    Hospital Course:    1.  Hospitalized early January 2020 following a slip and fall at home resulting in dislocation of her prosthetic hip.  It could not be reduced in the emergency room and she needed to go to the OR for anesthetic reduction.  She did not require open surgery and there were no other fractures noted.  She was placed in an abductor brace.  Hospitalization was remarkable hypertensive crisis attributed to pain.  She also suffered acute blood loss anemia with hemoglobin down to 7.5.  She did receive 2 units PRBCs with hemoglobin back up to  9.6.  She experienced mild rhabdomyolysis with a CPK of 2927.  Her coccyx wound was discovered and treated as well.     Patient was discharged to [facility] where she stayed between January 8 and March 11.  Towards the end of her stay she developed a red distal thigh there was concern about infection versus DVT.  Doppler study was negative.  She was placed on Keflex and was still on it at the time of discharge.  She also experienced an E. coli UTI and was treated with Septra without TCU stay.  TCU recommended 24-hour care for safety, it was not clear that the family could provide that, vulnerable adult report was filed by .     Patient had home services involved following her discharge from the TCU.  After couple days at home she noted that she suddenly had a lot of liquid on her close in the area of the distal right thigh swelling.  She has since had an open draining wound.    2.  Patient was admitted between April 3 and April 10 following another fall at home.  She describes being in her wheelchair going to the bathroom and when she was transferring back to her wheelchair she fell and injured her leg.  She pushed her medical alert button and help arrived.   Evaluation revealed a distal fibular fracture treated with Cam walker.  Weightbearing as tolerated.  She was seen by Ortho.  Hospitalization also remarkable for stable anemia 8.6-8.8.  She had another Doppler of her leg which again was negative for DVT.  She had a negative chest x-ray.  She had significant elevation of her CRP and the team at the hospital felt that this likely represented cellulitis/infection/possible ruptured/infected hematoma.  She was treated with Rocephin plus clindamycin, discharged on clindamycin which continues through April 8.  It does not appear wound culture was obtained.  There was no growth from blood culture.      Subjective/ROS:    -augmented by discussion with facility staff involved in direct care      Patient  "reports that she has no pain.  Even the draining area of her leg never hurt much and does not hurt at all now.  She says the drainage is significantly decreased since the wound has started being packed as it has significant depth according to staff but has not been measured (it will be today).  She reports it feels like it is healing now because it is itchy.  She said prior to it draining it was \"a big lump\" which was somewhat tender.  She does not believe any blood ever drained out of it but is not sure of what the initial cause of the fluid was.    Patient reports having trouble with the cam walker.  She says it is heavy and awkward and she anticipates physical therapy to remain slow as long she has to wear it.    Review of systems is otherwise positive for chronic carpal tunnel and finger numbness on the right and mild on the left.  She has a history of ankle edema.  She says she was in for her routine podiatry toenail work and the podiatrist noted that and sent her to her primary physician for hydrochlorothiazide was added to her treatment.  Otherwise patient says that she feels just fine.  She denies headaches change in vision speaking swallowing hearing coughing gagging orthopnea PND peripheral edema cough wheeze chest pain jaw pain arm pain back pain abdominal pain nausea vomiting diarrhea melena bright red blood per rectum dysuria.  She reports that she did have some swelling and redness in the distal right leg while she was in the hospital but that seems to have cleared up.  She says 1 of the doctors said \"it might be gout\" but she is never had it before and she describes a distribution including the non-joint pretibial area on the right side.  She has scarring on her nose from old basal cell surgery.  She is got good mood good bowel function good bladder function.  Remainder 13 system ROS negative    Past Medical History:   Diagnosis Date   ? Basal cell carcinoma 9/2000    forehead   ? Bunion    ? " Cellulitis    ? Fatty liver    ? Hammer toe    ? Hypertension    ? Open wound(s) (multiple) of unspecified site(s), without mention of complication    ? Papilloma of breast      Past Surgical History:   Procedure Laterality Date   ? BREAST BIOPSY Left     benign nipple removed   ? bunion and hammertoe Right 1992   ? CATARACT EXTRACTION  1994   ? CLOSED REDUCTION HIP DISLOCATION Right 1/4/2020    Procedure: CLOSED REDUCTION, HIP;  Surgeon: Dariel Em MD;  Location: [facility] OR;  Service: Orthopedics   ? DILATION AND CURETTAGE OF UTERUS      x2   ? TOTAL ABDOMINAL HYSTERECTOMY W/ BILATERAL SALPINGOOPHORECTOMY  1980   ? TOTAL HIP ARTHROPLASTY Right 2009          Family History   Problem Relation Age of Onset   ? No Medical Problems Mother    ? Stroke Father    ? No Medical Problems Sister    ? No Medical Problems Daughter    ? No Medical Problems Maternal Grandmother    ? No Medical Problems Maternal Grandfather    ? No Medical Problems Paternal Grandmother    ? No Medical Problems Paternal Grandfather    ? No Medical Problems Maternal Aunt    ? No Medical Problems Paternal Aunt    ? BRCA 1/2 Neg Hx    ? Breast cancer Neg Hx    ? Cancer Neg Hx    ? Colon cancer Neg Hx    ? Endometrial cancer Neg Hx    ? Ovarian cancer Neg Hx    :       Social History     Socioeconomic History   ? Marital status:      Spouse name: Not on file   ? Number of children: Not on file   ? Years of education: Not on file   ? Highest education level: Not on file   Occupational History   ? Not on file   Social Needs   ? Financial resource strain: Not on file   ? Food insecurity     Worry: Not on file     Inability: Not on file   ? Transportation needs     Medical: Not on file     Non-medical: Not on file   Tobacco Use   ? Smoking status: Never Smoker   ? Smokeless tobacco: Never Used   Substance and Sexual Activity   ? Alcohol use: No   ? Drug use: Never   ? Sexual activity: Not on file   Lifestyle   ? Physical activity      Days per week: Not on file     Minutes per session: Not on file   ? Stress: Not on file   Relationships   ? Social connections     Talks on phone: Not on file     Gets together: Not on file     Attends Orthodoxy service: Not on file     Active member of club or organization: Not on file     Attends meetings of clubs or organizations: Not on file     Relationship status: Not on file   ? Intimate partner violence     Fear of current or ex partner: Not on file     Emotionally abused: Not on file     Physically abused: Not on file     Forced sexual activity: Not on file   Other Topics Concern   ? Not on file   Social History Narrative    Living at home alone.  and has 1 daughter in [place].    :    Patient says she is living in [place].  She has a winter downstairs.  She has a daughter who lives in [place].  She has 2 nephews who are close by.  She still drives.  She reports her   in .    Current Outpatient Medications on File Prior to Visit   Medication Sig Dispense Refill   ? acetaminophen (TYLENOL) 500 MG tablet Take 2 tablets (1,000 mg total) by mouth 3 (three) times a day.  0   ? ascorbic acid, vitamin C, (VITAMIN C) 500 MG tablet Take 500 mg by mouth daily.     ? aspirin 81 mg chewable tablet Chew 1 tablet (81 mg total) 2 (two) times a day.  0   ? atenolol (TENORMIN) 50 MG tablet Take 50 mg by mouth daily.     ? CALCIUM CARBONATE (CALCIUM 500 ORAL) Take 1 tablet by mouth daily.      ? clindamycin (CLEOCIN) 150 MG capsule Take 3 capsules (450 mg total) by mouth 3 (three) times a day for 8 days. 72 capsule 0   ? glucosamine sulfate 500 mg cap Take 500 mg by mouth daily.     ? hydrochlorothiazide (HYDRODIURIL) 25 MG tablet Take 25 mg by mouth daily.     ? Lactobacillus rhamnosus GG (CULTURELLE) 15 billion cell CpSP Take 1 capsule by mouth 2 (two) times a day with meals for 10 days. 20 capsule 0   ? lisinopriL (PRINIVIL,ZESTRIL) 10 MG tablet Take 1 tablet (10 mg total) by mouth daily. 30  tablet 0   ? magnesium oxide 250 mg magnesium Tab Take 250 mg by mouth daily.     ? nystatin (MYCOSTATIN) powder Apply 1 application topically 2 (two) times a day as needed.      ? omega-3 fatty acids 1,000 mg cap Take 1,000 mg by mouth daily.      ? polyethylene glycol (MIRALAX) 17 gram packet Take 1 packet (17 g total) by mouth daily as needed (constipation).  0   ? potassium chloride (KLOR-CON) 10 MEQ CR tablet Take 10 mEq by mouth daily.     ? traMADoL (ULTRAM) 50 mg tablet Take 1 tablet (50 mg total) by mouth every 6 (six) hours as needed. 10 tablet 0     No current facility-administered medications on file prior to visit.    :      ALLERGIES:  Penicillins    Vitals:  There were no vitals taken for this visit. except as noted below    Vital signs: Reviewed per facility EMR vitals including as follows:                Current Vitals     BP: 135/57 mmHg  4/17/2020 14:06  Temp:98.6  F  4/17/2020 14:06  Pulse: 60 bpm  4/17/2020 14:06  Weight: 163.5 Lbs  4/14/2020 16:01  Resp: 15 Breaths/min  4/17/2020 14:06  BS:  O2: 97 %  4/17/2020 14:06  Pain: 0  4/17/2020 14:06  There is no height or weight on file to calculate BMI.    Physical exam:    Patient is alert oriented pleasant oriented x3 and a good historian.  Normocephalic atraumatic sclera clear she demonstrates  very good range of motion of her neck.  She demonstrates lifting her arms up above her head.  She demonstrates hip flexion knee extension dorsi and plantar flexion of her right foot.  Her left foot and ankle are in a cam walker.    I did undress the wound on the right leg (positive PPE in place).  She has a 1.5 x 3 cm opening with very clean margins and is steep drop-off straight down into a depth of unknown length.  She has firm indurated skin and soft tissues for approximately 3 cm surrounding the wound.  There is no redness at all at this point.  The drainage on the dressings is clear yellow serous type.  I did not examine coccyx wound today but did  discuss it with nursing    No edema.  Skin is clear in other visualized portions    Due to the 2020 Covid 19 pandemic, except as noted above, the patient was visually observed at a 6 foot plus distance.  An observational exam was performed in an effort to keep patient safe from Covid 19 and other communicable diseases.   Labs:  Lab Results   Component Value Date    WBC 5.9 04/17/2020    HGB 8.5 (L) 04/17/2020    HCT 26.5 (L) 04/17/2020     (H) 04/17/2020     04/17/2020     Results for orders placed or performed in visit on 04/17/20   Basic Metabolic Panel   Result Value Ref Range    Sodium 133 (L) 136 - 145 mmol/L    Potassium 4.9 3.5 - 5.0 mmol/L    Chloride 102 98 - 107 mmol/L    CO2 22 22 - 31 mmol/L    Anion Gap, Calculation 9 5 - 18 mmol/L    Glucose 92 70 - 125 mg/dL    Calcium 8.7 8.5 - 10.5 mg/dL    BUN 42 (H) 8 - 28 mg/dL    Creatinine 1.01 0.60 - 1.10 mg/dL    GFR MDRD Af Amer >60 >60 mL/min/1.73m2    GFR MDRD Non Af Amer 52 (L) >60 mL/min/1.73m2         Lab Results   Component Value Date    TSH 1.52 01/04/2020     Lab Results   Component Value Date    HGBA1C 5.4 04/04/2020     [unfilled]  Lab Results   Component Value Date    AEEYJINC52 613 04/25/2019     No results found for: BNP  [unfilled]  Most Recent EKG     Units 04/03/20  1928   VENTRATE BPM 84   ATRIALRATE BPM 84   QRSDURATION ms 140   QTINTERVAL ms 394   QTCCALC ms 465   P [place] degrees 40   RAXIS degrees -25   TAXIS degrees -17   MUSEDX  Sinus rhythm with 1st degree A-V block  Right bundle branch block  Abnormal ECG  When compared with ECG of 04-JAN-2020 17:23,  Aberrant conduction is no longer Present  QT has shortened  Confirmed by SEE ED PROVIDER NOTE FOR, ECG INTERPRETATION ([id number]),  SERGE DON ([id number]) on 4/4/2020 1:39:05 AM         Negative COVID testing 4/7/2020    Assessment/Plan:      ICD-10-CM    1. Open wound(s) (multiple) of unspecified site(s), without mention of complication  T14.8XXA    2. Electrolyte  disturbance  E87.8    3. Cellulitis of right lower extremity  L03.115    4. Pressure injury of sacral region, stage 3 (H)  L89.153        Multiple falls  January 2020 prosthetic hip dislocation  April 2020 fibular fracture   PT, OT, .  Patient has been living independently and will need an assessment for discharge safety.  I did discuss with her whether she is ready for change to assisted living and it sounds like she would at least consider that option.  Anticipate discharge per therapies.  Wean Ultram as able.  Continue Tylenol 1000 3 times daily.  She is on glucosamine for osteoarthritis as well.    Stage 3 sacral wound   I did not examine today.  The staff reports a clean base.  She was recently seen by wound care in the hospital.  Continue those orders.  She will need outpatient follow-up as well.  This will be complicated by the coronavirus pandemic, home care, assuming she goes home most likely    Right distal thigh wound   Questionable infected hematoma.  This seems like the most possible explanation.  May have had blood catheter distally from the original hip dislocation as she did have significant blood loss anemia at that point.  She does describe a large lump without other signs of infection before rupture.  No cultures have been done so is not clear to me that it was ever truly infected.  Certainly no pus or redness at this point.  Nonetheless we will continue the clindamycin as recommended through April 18.  Continue dressing changes at least daily and as needed.  Wound will need to be packed.  We discussed the prolonged time to healing anticipated the need for ongoing wound cares.  She states understanding.    Electrolyte disturbance   Hyponatremia is mild and asymptomatic.  Most likely related to hydrochlorothiazide.  She has mild hypomagnesia and will be replaced for a week with a recheck of electrolytes next Thursday hopefully the blood work will be ready for my review on Friday.   Hydrochlorothiazide can be continued at this point.    Acute blood loss anemia  Chronic macrocytic anemia   Patient did have B12 level done in 2019.  She had TSH done in January 2020.  The cause of her macrocytosis is unknown.  I do not see that she has had a peripheral smear.  Her RDW is elevated at 14.9.  Her white blood count and platelet lines are normal.  -We will start with folate B12 TSH TIBC ferritin iron and transferrin next Thursday.  Anticipate she will need further outpatient work-up with reticulocyte count, peripheral smear.  May need hematology involvement depending on those results.    Hypertension  Hypertensive crisis in January   Patient is on lisinopril hydrochlorothiazide atenolol.  She also takes potassium.  Blood pressures have been acceptable.  No changes were made.  Monitor electrolytes as above.  Creatinine will be checked next week as well.    CKD 3   Noted, monitor as above avoid nephrotoxins were able    Disposition   24-hour care has already been recommended by therapist at [facility].  It would be surprising if we came to a different finding here.  Social service involved, assisted living appears to be a good option with no close family who can perform 24-hour supervision.          Case discussed with:    Facility staff         Mor Flores MD

## 2021-06-20 NOTE — LETTER
Letter by Nazanin Gonsalves CNP at      Author: Nazanin Gonsalves CNP Service: -- Author Type: --    Filed:  Encounter Date: 3/4/2020 Status: (Other)         Patient: Mitzi De Santiago   MR Number: [id number]   YOB: 1934   Date of Visit: 3/4/2020     [facility]    Facility:   [facility] [[id number]]   Code Status: FULL CODE      CHIEF COMPLAINT/REASON FOR VISIT:  Chief Complaint   Patient presents with   ? Review Of Multiple Medical Conditions       HISTORY:      HPI: Mitzi is a 85 y.o. female undergoing physical and occupational therapy at [facility] transitional care unit.  She is with past medical history of hypertension, hematoma, chronic kidney disease stage II-III and osteoarthritis. Today[facility] PT OT therapy to evaluate for lymph wraps who presented to the emergency department for evaluation of right hip pain after mechanical fall.  She underwent a reduction under anesthesia.  She is with a history of a total right hip arthroplasty the femoral prosthesis is dislocated superiorly and no fracture.  She also had a hypertension crisis during her hospitalization per hospital report probably secondary to pain she did receive IV hydralazine.    Today she is seen for a routine medical  visit and fevers. She spiked a fever the day prior of 101.0 and came down to 99.6. She denied pain with urination. Her LS were clear and she had no cough .    She denies chest pain or shortness of breath.  She is moving her bowels.  She is alert and oriented x4.   She denies pain  She is no longer wearing the abductor brace but is with hip precautions.  She is ambulating with a walker.  Her  Last CK is WNL at  48.        Past Medical History:   Diagnosis Date   ? Basal cell carcinoma 9/2000    forehead   ? Bunion    ? Cellulitis    ? Fatty liver    ? Hammer toe    ? Hypertension    ? Open wound(s) (multiple) of unspecified site(s), without mention of complication    ?  Papilloma of breast              Family History   Problem Relation Age of Onset   ? No Medical Problems Mother    ? Stroke Father    ? No Medical Problems Sister    ? No Medical Problems Daughter    ? No Medical Problems Maternal Grandmother    ? No Medical Problems Maternal Grandfather    ? No Medical Problems Paternal Grandmother    ? No Medical Problems Paternal Grandfather    ? No Medical Problems Maternal Aunt    ? No Medical Problems Paternal Aunt    ? BRCA 1/2 Neg Hx    ? Breast cancer Neg Hx    ? Cancer Neg Hx    ? Colon cancer Neg Hx    ? Endometrial cancer Neg Hx    ? Ovarian cancer Neg Hx      Social History     Socioeconomic History   ? Marital status:      Spouse name: Not on file   ? Number of children: Not on file   ? Years of education: Not on file   ? Highest education level: Not on file   Occupational History   ? Not on file   Social Needs   ? Financial resource strain: Not on file   ? Food insecurity:     Worry: Not on file     Inability: Not on file   ? Transportation needs:     Medical: Not on file     Non-medical: Not on file   Tobacco Use   ? Smoking status: Never Smoker   ? Smokeless tobacco: Never Used   Substance and Sexual Activity   ? Alcohol use: No   ? Drug use: Never   ? Sexual activity: Not on file   Lifestyle   ? Physical activity:     Days per week: Not on file     Minutes per session: Not on file   ? Stress: Not on file   Relationships   ? Social connections:     Talks on phone: Not on file     Gets together: Not on file     Attends Zoroastrianism service: Not on file     Active member of club or organization: Not on file     Attends meetings of clubs or organizations: Not on file     Relationship status: Not on file   ? Intimate partner violence:     Fear of current or ex partner: Not on file     Emotionally abused: Not on file     Physically abused: Not on file     Forced sexual activity: Not on file   Other Topics Concern   ? Not on file   Social History Narrative    Living at  home alone.  and has 1 daughter in [place].          Review of Systems   Constitutional: Positive for activity change and fatigue. Negative for appetite change and fever.   HENT: Negative for congestion.    Respiratory: Negative for cough, shortness of breath and wheezing.    Cardiovascular: Negative for chest pain and leg swelling.   Gastrointestinal: Negative for abdominal distention, abdominal pain, constipation, diarrhea and nausea.        Pt with an umbilical hernia    Genitourinary: Negative for dysuria.   Musculoskeletal: Positive for arthralgias. Negative for back pain.   Skin: Positive for wound. Negative for color change.   Neurological: Negative for dizziness.   Psychiatric/Behavioral: Negative for agitation, behavioral problems and confusion.       Vitals:    03/04/20 0734   BP: 146/67   Pulse: 65   Resp: 18   Temp: (!) 101  F (38.3  C)   SpO2: 97%   Weight: 159 lb 8 oz (72.3 kg)       Physical Exam  Constitutional:       Appearance: She is well-developed.      Comments: Pleasant woman in no acute distress   HENT:      Head: Normocephalic.   Eyes:      Conjunctiva/sclera: Conjunctivae normal.   Neck:      Musculoskeletal: Normal range of motion.   Cardiovascular:      Rate and Rhythm: Normal rate and regular rhythm.      Heart sounds: Normal heart sounds. No murmur.   Pulmonary:      Effort: No respiratory distress.      Breath sounds: Normal breath sounds. No wheezing or rales.   Abdominal:      General: Bowel sounds are normal. There is no distension.      Palpations: Abdomen is soft.      Tenderness: There is no abdominal tenderness.   Musculoskeletal: Normal range of motion.      Comments:   Decreased range of motion left upper extremity she reports it has been this way for years.   Skin:     General: Skin is warm.      Comments: Coccyx wound being followed by the wound care team healing well per staff.      Neurological:      Mental Status: She is alert and oriented to person, place, and time.    Psychiatric:         Behavior: Behavior normal.           LABS:   No results found for this or any previous visit (from the past 240 hour(s)).    ASSESSMENT:      ICD-10-CM    1. Accelerated hypertension I10    2. Posterior dislocation of right hip, initial encounter (H) S73.014A    3. Pain management R52    4. Fever, unspecified fever cause R50.9        PLAN:      Fever, Check U/A/ U/C, BMP, CBC, CK,      hypertension continue atenolol and hydrochlorothiazide blood pressure stable    Coccyx wound continue dressing changes she is being followed by the wound care team    Pain management patient reports pain controlled on scheduled Tylenol    Anticoagulation currently on 81 mg twice daily    Reduction of a right dislocated hip patient underwent reduction in the OR under anesthesia failed attempt in the ER, She no longer needs to wear the abductor brace. Ambulating with a walker but does have hip precautions.     [facility] PT OT    Rhabdomyolysis-elevated CK on 1/6/2020 and now within normal limits at 48, another CK pending     Electronically signed by: Nazanin Gonsalves CNP

## 2021-06-20 NOTE — LETTER
Letter by Nazanin Gonsalves CNP at      Author: Nazanin Gonsalves CNP Service: -- Author Type: --    Filed:  Encounter Date: 1/17/2020 Status: Signed         Patient: Mitzi De Santiago   MR Number: [id number]   YOB: 1934   Date of Visit: 1/17/2020     [facility]    Facility:   [facility] [[id number]]   Code Status: FULL CODE      CHIEF COMPLAINT/REASON FOR VISIT:  Chief Complaint   Patient presents with   ? Review Of Multiple Medical Conditions       HISTORY:      HPI: Mitzi is a 85 y.o. female undergoing physical and occupational therapy at [facility] transitional care unit.  She is with past medical history of hypertension, hematoma, chronic kidney disease stage II-III and osteoarthritis. Today[facility] PT OT therapy to evaluate for lymph wraps who presented to the emergency department for evaluation of right hip pain after mechanical fall.  She underwent a reduction under anesthesia.  She is with a history of a total right hip arthroplasty the femoral prosthesis is dislocated superiorly and no fracture.  She also had a hypertension crisis during her hospitalization per hospital report probably secondary to pain she did receive IV hydralazine.    Today she is seen as a routine visit to review multiple medical issues.  She denies chest pain or shortness of breath.  She is moving her bowels and .  no issues with urination. [employer] forms also filled out with patient at the bedside,  She is alert and oriented x4.   Her pain is controlled.  She does have a coccyx wound and being followed by the  wound nurse. Per stqff the wound is 100 granulated measuring .5 X.5 with 2.5 depth.   She does have a hinged hip brace for when she is out of bed.  She is with lower extremity edema and wearing Compression   Her CK is WNL at  48.  She is making progress, she was able to walk the bars.  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     Past Medical  History:   Diagnosis Date   ? Basal cell carcinoma 9/2000    forehead   ? Bunion    ? Cellulitis    ? Fatty liver    ? Hammer toe    ? Hypertension    ? Open wound(s) (multiple) of unspecified site(s), without mention of complication    ? Papilloma of breast              Family History   Problem Relation Age of Onset   ? No Medical Problems Mother    ? Stroke Father    ? No Medical Problems Sister    ? No Medical Problems Daughter    ? No Medical Problems Maternal Grandmother    ? No Medical Problems Maternal Grandfather    ? No Medical Problems Paternal Grandmother    ? No Medical Problems Paternal Grandfather    ? No Medical Problems Maternal Aunt    ? No Medical Problems Paternal Aunt    ? BRCA 1/2 Neg Hx    ? Breast cancer Neg Hx    ? Cancer Neg Hx    ? Colon cancer Neg Hx    ? Endometrial cancer Neg Hx    ? Ovarian cancer Neg Hx      Social History     Socioeconomic History   ? Marital status:      Spouse name: Not on file   ? Number of children: Not on file   ? Years of education: Not on file   ? Highest education level: Not on file   Occupational History   ? Not on file   Social Needs   ? Financial resource strain: Not on file   ? Food insecurity:     Worry: Not on file     Inability: Not on file   ? Transportation needs:     Medical: Not on file     Non-medical: Not on file   Tobacco Use   ? Smoking status: Never Smoker   ? Smokeless tobacco: Never Used   Substance and Sexual Activity   ? Alcohol use: No   ? Drug use: Never   ? Sexual activity: Not on file   Lifestyle   ? Physical activity:     Days per week: Not on file     Minutes per session: Not on file   ? Stress: Not on file   Relationships   ? Social connections:     Talks on phone: Not on file     Gets together: Not on file     Attends Yazidism service: Not on file     Active member of club or organization: Not on file     Attends meetings of clubs or organizations: Not on file     Relationship status: Not on file   ? Intimate partner  violence:     Fear of current or ex partner: Not on file     Emotionally abused: Not on file     Physically abused: Not on file     Forced sexual activity: Not on file   Other Topics Concern   ? Not on file   Social History Narrative    Living at home alone.  and has 1 daughter in [place].          Review of Systems   Constitutional: Positive for activity change and fatigue. Negative for appetite change and fever.   HENT: Negative for congestion.    Respiratory: Negative for cough, shortness of breath and wheezing.    Cardiovascular: Negative for chest pain and leg swelling.   Gastrointestinal: Negative for abdominal distention, abdominal pain, constipation, diarrhea and nausea.   Genitourinary: Negative for dysuria.   Musculoskeletal: Positive for arthralgias. Negative for back pain.   Skin: Positive for wound. Negative for color change.   Neurological: Negative for dizziness.   Psychiatric/Behavioral: Negative for agitation, behavioral problems and confusion.       Vitals:    01/17/20 0916   BP: 110/60   Pulse: (!) 57   Resp: 18   Temp: 98.7  F (37.1  C)   SpO2: 95%   Weight: 161 lb 8 oz (73.3 kg)       Physical Exam  Constitutional:       Appearance: She is well-developed.      Comments: Pleasant woman in no acute distress   HENT:      Head: Normocephalic.   Eyes:      Conjunctiva/sclera: Conjunctivae normal.   Neck:      Musculoskeletal: Normal range of motion.   Cardiovascular:      Rate and Rhythm: Normal rate and regular rhythm.      Heart sounds: Normal heart sounds. No murmur.   Pulmonary:      Effort: No respiratory distress.      Breath sounds: Normal breath sounds. No wheezing or rales.   Abdominal:      General: Bowel sounds are normal. There is no distension.      Palpations: Abdomen is soft.      Tenderness: There is no abdominal tenderness.   Musculoskeletal: Normal range of motion.      Comments: A hinged hip brace  Decreased range of motion left upper extremity she reports it has been this way  for years.   Skin:     General: Skin is warm.      Comments: Coccyx wound being followed by the wound care team  Rash in her abdominal folds nystatin powder ordered   Neurological:      Mental Status: She is alert and oriented to person, place, and time.   Psychiatric:         Behavior: Behavior normal.           LABS:   Recent Results (from the past 240 hour(s))   Crossmatch   Result Value Ref Range    Crossmatch Compatible     Blood Expiration Date [id number]     Unit Type O Pos     Unit Number [id number]     Status Transfused     Component Red Blood Cells     PRODUCT CODE F7824U34     Issue Date and Time [id number]     Blood Type 5100     CODING SYSTEM [id number]    Crossmatch   Result Value Ref Range    Crossmatch Compatible     Blood Expiration Date 95575483472593     Unit Type O Pos     Unit Number [id number]     Status Transfused     Component Red Blood Cells     PRODUCT CODE R4516A00     Issue Date and Time [id number]     Blood Type 5100     CODING SYSTEM JXLS286    Basic Metabolic Panel   Result Value Ref Range    Sodium 137 136 - 145 mmol/L    Potassium 4.0 3.5 - 5.0 mmol/L    Chloride 106 98 - 107 mmol/L    CO2 23 22 - 31 mmol/L    Anion Gap, Calculation 8 5 - 18 mmol/L    Glucose 92 70 - 125 mg/dL    Calcium 8.8 8.5 - 10.5 mg/dL    BUN 23 8 - 28 mg/dL    Creatinine 0.80 0.60 - 1.10 mg/dL    GFR MDRD Af Amer >60 >60 mL/min/1.73m2    GFR MDRD Non Af Amer >60 >60 mL/min/1.73m2   CK Total   Result Value Ref Range    CK, Total 48 30 - 190 U/L       ASSESSMENT:      ICD-10-CM    1. Secondary hypertension I15.9    2. Pain management R52    3. Anticoagulated Z79.01    4. Dislocated hip, right, sequela S73.004S        PLAN:      Greater than 20 minutes spent with patient working on [employer] paperwork.     Hypertension continue atenolol and hydrochlorothiazide blood pressure stable    Coccyx wound continue dressing changes she is being followed by the wound care team    Pain management patient  reports pain controlled on scheduled Tylenol    Anticoagulation currently on 81 mg twice daily    Reduction of a right dislocated hip patient underwent reduction in the OR under anesthesia failed attempt in the ER, patient with a hinged hip brace    [facility] PT OT    Rhabdomyolysis-elevated CK on 1/6/2020 and now within normal limits at 48    Electronically signed by: Nazanin Gonsalves CNP

## 2021-06-20 NOTE — LETTER
Letter by Nazanin Gonsalves CNP at      Author: Nazanin Gonsalves CNP Service: -- Author Type: --    Filed:  Encounter Date: 2/25/2020 Status: (Other)         Patient: Mitzi De Santiago   MR Number: [id number]   YOB: 1934   Date of Visit: 2/25/2020     [facility]    Facility:   [facility] [[id number]]   Code Status: FULL CODE      CHIEF COMPLAINT/REASON FOR VISIT:  Chief Complaint   Patient presents with   ? Review Of Multiple Medical Conditions       HISTORY:      HPI: Mitzi is a 85 y.o. female undergoing physical and occupational therapy at [facility] transitional care unit.  She is with past medical history of hypertension, hematoma, chronic kidney disease stage II-III and osteoarthritis. Today[facility] PT OT therapy to evaluate for lymph wraps who presented to the emergency department for evaluation of right hip pain after mechanical fall.  She underwent a reduction under anesthesia.  She is with a history of a total right hip arthroplasty the femoral prosthesis is dislocated superiorly and no fracture.  She also had a hypertension crisis during her hospitalization per hospital report probably secondary to pain she did receive IV hydralazine.    Today she is seen for a routine medical  visit.  She will going  home this morning for a home safety evaluation and our  facility therapist will be meeting her at her home.    She denies chest pain or shortness of breath.  She is moving her bowels and has  no issues with urination.  She is alert and oriented x4.   She denies pain  She is no longer wearing the abductor brace.  She is ambulating with a walker.  Her CK is WNL at  48.    Denies any congestion or cough.     Past Medical History:   Diagnosis Date   ? Basal cell carcinoma 9/2000    forehead   ? Bunion    ? Cellulitis    ? Fatty liver    ? Hammer toe    ? Hypertension    ? Open wound(s) (multiple) of unspecified site(s), without mention of  complication    ? Papilloma of breast              Family History   Problem Relation Age of Onset   ? No Medical Problems Mother    ? Stroke Father    ? No Medical Problems Sister    ? No Medical Problems Daughter    ? No Medical Problems Maternal Grandmother    ? No Medical Problems Maternal Grandfather    ? No Medical Problems Paternal Grandmother    ? No Medical Problems Paternal Grandfather    ? No Medical Problems Maternal Aunt    ? No Medical Problems Paternal Aunt    ? BRCA 1/2 Neg Hx    ? Breast cancer Neg Hx    ? Cancer Neg Hx    ? Colon cancer Neg Hx    ? Endometrial cancer Neg Hx    ? Ovarian cancer Neg Hx      Social History     Socioeconomic History   ? Marital status:      Spouse name: Not on file   ? Number of children: Not on file   ? Years of education: Not on file   ? Highest education level: Not on file   Occupational History   ? Not on file   Social Needs   ? Financial resource strain: Not on file   ? Food insecurity:     Worry: Not on file     Inability: Not on file   ? Transportation needs:     Medical: Not on file     Non-medical: Not on file   Tobacco Use   ? Smoking status: Never Smoker   ? Smokeless tobacco: Never Used   Substance and Sexual Activity   ? Alcohol use: No   ? Drug use: Never   ? Sexual activity: Not on file   Lifestyle   ? Physical activity:     Days per week: Not on file     Minutes per session: Not on file   ? Stress: Not on file   Relationships   ? Social connections:     Talks on phone: Not on file     Gets together: Not on file     Attends [employer] service: Not on file     Active member of club or organization: Not on file     Attends meetings of clubs or organizations: Not on file     Relationship status: Not on file   ? Intimate partner violence:     Fear of current or ex partner: Not on file     Emotionally abused: Not on file     Physically abused: Not on file     Forced sexual activity: Not on file   Other Topics Concern   ? Not on file   Social History  Narrative    Living at home alone.  and has 1 daughter in [place].          Review of Systems   Constitutional: Positive for activity change and fatigue. Negative for appetite change and fever.   HENT: Negative for congestion.    Respiratory: Negative for cough, shortness of breath and wheezing.    Cardiovascular: Negative for chest pain and leg swelling.   Gastrointestinal: Negative for abdominal distention, abdominal pain, constipation, diarrhea and nausea.   Genitourinary: Negative for dysuria.   Musculoskeletal: Positive for arthralgias. Negative for back pain.   Skin: Positive for wound. Negative for color change.   Neurological: Negative for dizziness.   Psychiatric/Behavioral: Negative for agitation, behavioral problems and confusion.       Vitals:    02/25/20 0931   BP: 149/66   Pulse: 66   Resp: 20   Temp: 98.7  F (37.1  C)   SpO2: 97%   Weight: 163 lb 6.4 oz (74.1 kg)       Physical Exam  Constitutional:       Appearance: She is well-developed.      Comments: Pleasant woman in no acute distress   HENT:      Head: Normocephalic.   Eyes:      Conjunctiva/sclera: Conjunctivae normal.   Neck:      Musculoskeletal: Normal range of motion.   Cardiovascular:      Rate and Rhythm: Normal rate and regular rhythm.      Heart sounds: Normal heart sounds. No murmur.   Pulmonary:      Effort: No respiratory distress.      Breath sounds: Normal breath sounds. No wheezing or rales.   Abdominal:      General: Bowel sounds are normal. There is no distension.      Palpations: Abdomen is soft.      Tenderness: There is no abdominal tenderness.   Musculoskeletal: Normal range of motion.      Comments:   Decreased range of motion left upper extremity she reports it has been this way for years.   Skin:     General: Skin is warm.      Comments: Coccyx wound being followed by the wound care team healing well per staff.      Neurological:      Mental Status: She is alert and oriented to person, place, and time.   Psychiatric:          Behavior: Behavior normal.           LABS:   No results found for this or any previous visit (from the past 240 hour(s)).    ASSESSMENT:      ICD-10-CM    1. Posterior dislocation of right hip, initial encounter (H) S73.014A    2. Pain management R52    3. Encounter for home safety review for injury prevention Z71.89        PLAN:      Pt going home today for an in home safety evaluation      hypertension continue atenolol and hydrochlorothiazide blood pressure stable    Coccyx wound continue dressing changes she is being followed by the wound care team    Pain management patient reports pain controlled on scheduled Tylenol    Anticoagulation currently on 81 mg twice daily    Reduction of a right dislocated hip patient underwent reduction in the OR under anesthesia failed attempt in the ER, She no longer needs to wear the abductor brace. Ambulating with a walker     [facility] PT OT    Rhabdomyolysis-elevated CK on 1/6/2020 and now within normal limits at 48    Electronically signed by: Nazanin Gonsalves CNP

## 2021-06-20 NOTE — LETTER
Letter by Mor Flores MD at      Author: Mor Flores MD Service: -- Author Type: --    Filed:  Encounter Date: 4/24/2020 Status: (Other)         Patient: Mitzi De Santiago   MR Number: [id number]   YOB: 1934   Date of Visit: 4/24/2020      [facility]    Facility:  [facility] [[id number]]    Code Status:  DNR    Chief Complaint   Patient presents with   ? Review Of Multiple Medical Conditions   : Diarrhea, pain control                  Patient Active Problem List   Diagnosis   ? Accelerated essential hypertension   ? Basal cell carcinoma   ? Fatty liver   ? Bunion   ? Hammer toe   ? Papilloma of breast   ? Cellulitis   ? Open wound(s) (multiple) of unspecified site(s), without mention of complication   ? Leucocytoclastic vasculitis (H)   ? Knee osteoarthritis   ? Impingement syndrome of right shoulder   ? Posterior dislocation of right hip, initial encounter (H)   ? Traumatic rhabdomyolysis, subsequent encounter   ? Status post total replacement of right hip   ? Acute blood loss as cause of postoperative anemia   ? Pressure injury of sacral region, stage 3 (H)   ? Generalized weakness   ? Fall against object   ? Electrolyte disturbance   ? Closed fibular fracture       History:  Mitzi De Santiago  is aan 85 year old female with history of CKD 3, chronic anemia, mild cognitive impairment, hypertension, osteoarthritis, stage III coccyx wound     Hospital Course:    1.  Hospitalized early January 2020 following a slip and fall at home resulting in dislocation of her prosthetic hip.  It could not be reduced in the emergency room and she needed to go to the OR for anesthetic reduction.  She did not require open surgery and there were no other fractures noted.  She was placed in an abductor brace.  Hospitalization was remarkable hypertensive crisis attributed to pain.  She also suffered acute blood loss anemia with hemoglobin down to 7.5.  She did  receive 2 units PRBCs with hemoglobin back up to 9.6.  She experienced mild rhabdomyolysis with a CPK of 2927.  Her coccyx wound was discovered and treated as well.                 Patient was discharged to [facility] where she stayed between January 8 and March 11.  Towards the end of her stay she developed a red distal thigh there was concern about infection versus DVT.  Doppler study was negative.  She was placed on Keflex and was still on it at the time of discharge.  She also experienced an E. coli UTI and was treated with Septra without TCU stay.  TCU recommended 24-hour care for safety, it was not clear that the family could provide that, vulnerable adult report was filed by .                 Patient had home services involved following her discharge from the TCU.  After couple days at home she noted that she suddenly had a lot of liquid on her close in the area of the distal right thigh swelling.  She has since had an open draining wound.     2.  Patient was admitted between April 3 and April 10 following another fall at home.  She describes being in her wheelchair going to the bathroom and when she was transferring back to her wheelchair she fell and injured her leg.  She pushed her medical alert button and help arrived.              Evaluation revealed a distal fibular fracture treated with Cam walker.  Weightbearing as tolerated.  She was seen by Ortho.  Hospitalization also remarkable for stable anemia 8.6-8.8.  She had another Doppler of her leg which again was negative for DVT.  She had a negative chest x-ray.  She had significant elevation of her CRP and the team at the hospital felt that this likely represented cellulitis/infection/possible ruptured/infected hematoma.  She was treated with Rocephin plus clindamycin, discharged on clindamycin which continues through April 8.  It does not appear wound culture was obtained.  There was no growth from blood culture.      Subjective/ROS:     -augmented by discussion with facility staff involved in direct care    -As notified by the staff that patient is having pain at times.  I talked to her and she says that her pain is actually well controlled.     Biofreeze added to shoulders and knees on April 23.  Additionally she is taking Tylenol thousand milligrams 3 times daily and Ultram continues 50 mg every 6 hours as needed.  She says it is her chronic left carpal tunnel pain and numbness that bothers her more than anything and that is not new.  She was too ill to have carpal tunnel release and has developed some muscle weakness over time is resolved.    -Patient's main concern is for loose stool.  2 to 3 days ago her stools became loose and have remained so.  However she is only having 2 stools a day generally in the morning.  She is not waking up at night with loose stool.  She is not had abdominal pain.  There is been no blood or melena.  She of course has been on antibiotics for her right thigh hematoma/infection.    - Staff reports that her sacral wound is clean and dry.    Patient reports no fever sweats or chills.  She has had no vomiting.  Reports good appetite.  She is not feeling any dysuria.  Her generalized weakness is slowly improving.      Past Medical History:   Diagnosis Date   ? Basal cell carcinoma 9/2000    forehead   ? Bunion    ? Cellulitis    ? Fatty liver    ? Hammer toe    ? Hypertension    ? Open wound(s) (multiple) of unspecified site(s), without mention of complication    ? Papilloma of breast      Past Surgical History:   Procedure Laterality Date   ? BREAST BIOPSY Left     benign nipple removed   ? bunion and hammertoe Right 1992   ? CATARACT EXTRACTION  1994   ? CLOSED REDUCTION HIP DISLOCATION Right 1/4/2020    Procedure: CLOSED REDUCTION, HIP;  Surgeon: Dariel Em MD;  Location: [facility];  Service: Orthopedics   ? DILATION AND CURETTAGE OF UTERUS      x2   ? TOTAL ABDOMINAL HYSTERECTOMY W/ BILATERAL  SALPINGOOPHORECTOMY  1980   ? TOTAL HIP ARTHROPLASTY Right 2009          Family History   Problem Relation Age of Onset   ? No Medical Problems Mother    ? Stroke Father    ? No Medical Problems Sister    ? No Medical Problems Daughter    ? No Medical Problems Maternal Grandmother    ? No Medical Problems Maternal Grandfather    ? No Medical Problems Paternal Grandmother    ? No Medical Problems Paternal Grandfather    ? No Medical Problems Maternal Aunt    ? No Medical Problems Paternal Aunt    ? BRCA 1/2 Neg Hx    ? Breast cancer Neg Hx    ? Cancer Neg Hx    ? Colon cancer Neg Hx    ? Endometrial cancer Neg Hx    ? Ovarian cancer Neg Hx    :       Social History     Socioeconomic History   ? Marital status:      Spouse name: Not on file   ? Number of children: Not on file   ? Years of education: Not on file   ? Highest education level: Not on file   Occupational History   ? Not on file   Social Needs   ? Financial resource strain: Not on file   ? Food insecurity     Worry: Not on file     Inability: Not on file   ? Transportation needs     Medical: Not on file     Non-medical: Not on file   Tobacco Use   ? Smoking status: Never Smoker   ? Smokeless tobacco: Never Used   Substance and Sexual Activity   ? Alcohol use: No   ? Drug use: Never   ? Sexual activity: Not on file   Lifestyle   ? Physical activity     Days per week: Not on file     Minutes per session: Not on file   ? Stress: Not on file   Relationships   ? Social connections     Talks on phone: Not on file     Gets together: Not on file     Attends Voodoo service: Not on file     Active member of club or organization: Not on file     Attends meetings of clubs or organizations: Not on file     Relationship status: Not on file   ? Intimate partner violence     Fear of current or ex partner: Not on file     Emotionally abused: Not on file     Physically abused: Not on file     Forced sexual activity: Not on file   Other Topics Concern   ? Not on  file   Social History Narrative    Living at home alone.  and has 1 daughter in [place].    :        Current Outpatient Medications on File Prior to Visit   Medication Sig Dispense Refill   ? acetaminophen (TYLENOL) 500 MG tablet Take 2 tablets (1,000 mg total) by mouth 3 (three) times a day.  0   ? ascorbic acid, vitamin C, (VITAMIN C) 500 MG tablet Take 500 mg by mouth daily.     ? aspirin 81 mg chewable tablet Chew 1 tablet (81 mg total) 2 (two) times a day.  0   ? atenolol (TENORMIN) 50 MG tablet Take 50 mg by mouth daily.     ? CALCIUM CARBONATE (CALCIUM 500 ORAL) Take 1 tablet by mouth daily.      ? ferrous sulfate 325 (65 FE) MG tablet Take 1 tablet by mouth daily with breakfast.     ? glucosamine sulfate 500 mg cap Take 500 mg by mouth daily.     ? hydrochlorothiazide (HYDRODIURIL) 25 MG tablet Take 25 mg by mouth daily.     ? lisinopriL (PRINIVIL,ZESTRIL) 10 MG tablet Take 1 tablet (10 mg total) by mouth daily. 30 tablet 0   ? magnesium oxide 250 mg magnesium Tab Take 250 mg by mouth daily.     ? nystatin (MYCOSTATIN) powder Apply 1 application topically 2 (two) times a day as needed.      ? polyethylene glycol (MIRALAX) 17 gram packet Take 1 packet (17 g total) by mouth daily as needed (constipation).  0   ? potassium chloride (KLOR-CON) 10 MEQ CR tablet Take 10 mEq by mouth daily.     ? traMADoL (ULTRAM) 50 mg tablet Take 1 tablet (50 mg total) by mouth every 6 (six) hours as needed. 10 tablet 0   ? menthol (BIOFREEZE, MENTHOL,) 4 % Gel Apply 1 application topically 3 (three) times a day as needed. (apply to knees and shoulders)     ? omega-3 fatty acids 1,000 mg cap Take 1,000 mg by mouth daily.        No current facility-administered medications on file prior to visit.    :      ALLERGIES:  Penicillins    Vitals:  There were no vitals taken for this visit. except as noted below    Vital signs: Reviewed per facility EMR vitals including as follows:                Current Vitals   BP: 131/68  mmHg  4/24/2020 22:40  Temp:98.9  F  4/24/2020 22:40  Pulse: 66 bpm  4/24/2020 22:40  Weight: 163.5 Lbs  4/14/2020 16:01  Resp: 16 Breaths/min  4/24/2020 22:40  BS:  O2: 96 %  4/24/2020 22:40  Pain: 0  There is no height or weight on file to calculate BMI.    Physical exam:    General:  Alert, pleasant and conversant appears no apparent distress    HEENT:  NC/AT, sclera clear, EOMI gaze is conjugate  Nonlabored breathing without tachypnea.  She is moving all extremities.  Wounds were not examined personally today.  However she did show me her ankles which appear nonedematous.          Due to the 2020 Covid 19 pandemic, except as noted above, the patient was visually observed at a 6 foot plus distance.  An observational exam was performed in an effort to keep patient safe from Covid 19 and other communicable diseases.   Labs:  Lab Results   Component Value Date    WBC 5.9 04/17/2020    HGB 8.5 (L) 04/17/2020    HCT 26.5 (L) 04/17/2020     (H) 04/17/2020     04/17/2020     Results for orders placed or performed in visit on 04/17/20   Basic Metabolic Panel   Result Value Ref Range    Sodium 133 (L) 136 - 145 mmol/L    Potassium 4.9 3.5 - 5.0 mmol/L    Chloride 102 98 - 107 mmol/L    CO2 22 22 - 31 mmol/L    Anion Gap, Calculation 9 5 - 18 mmol/L    Glucose 92 70 - 125 mg/dL    Calcium 8.7 8.5 - 10.5 mg/dL    BUN 42 (H) 8 - 28 mg/dL    Creatinine 1.01 0.60 - 1.10 mg/dL    GFR MDRD Af Amer >60 >60 mL/min/1.73m2    GFR MDRD Non Af Amer 52 (L) >60 mL/min/1.73m2         Lab Results   Component Value Date    TSH 1.82 04/23/2020     Lab Results   Component Value Date    HGBA1C 5.4 04/04/2020     [unfilled]  Lab Results   Component Value Date    [id number] 482 04/23/2020     No results found for: BNP  [unfilled]  Most Recent EKG     Units 04/03/20  1928   VENTRATE BPM 84   ATRIALRATE BPM 84   QRSDURATION ms 140   QTINTERVAL ms 394   QTCCALC ms 465   P [place] degrees 40   RAXIS degrees -25   TAXIS degrees -17    MUSEDX  Sinus rhythm with 1st degree A-V block  Right bundle branch block  Abnormal ECG  When compared with ECG of 04-JAN-2020 17:23,  Aberrant conduction is no longer Present  QT has shortened  Confirmed by SEE ED PROVIDER NOTE FOR, ECG INTERPRETATION ([id number]),  SERGE DON ([id number]) on 4/4/2020 1:39:05 AM         Results for HAYDER BARBOZA (MRN [id number]) as of 4/26/2020 21:05   Ref. Range 4/23/2020 06:25   Sodium Latest Ref Range: 136 - 145 mmol/L 134 (L)   Magnesium Latest Ref Range: 1.8 - 2.6 mg/dL 2.1   Iron Latest Ref Range: 42 - 175 ug/dL 81   Ferritin Latest Ref Range: 10 - 130 ng/mL 335 (H)   Folate Latest Ref Range: >=3.5 ng/mL 15.5   Vitamin B-12 Latest Ref Range: 213 - 816 pg/mL 482   Transferrin Latest Ref Range: 212 - 360 mg/dL 193 (L)   Transferrin IBC, Calculated Latest Ref Range: 313 - 563 ug/dL 241 (L)   Transferrin Saturation, Calculated Latest Ref Range: 20 - 50 % 34   TSH Latest Ref Range: 0.30 - 5.00 uIU/mL 1.82     Assessment/Plan:    1. Open wound(s) (multiple) of unspecified site(s), without mention of complication  T14.8XXA     2. Electrolyte disturbance  E87.8     3. Cellulitis of right lower extremity  L03.115     4. Pressure injury of sacral region, stage 3 (H)  L89.153          Diarrhea    Mild symptoms so far.  She has now completed her antibiotics and this is likely antibiotic induced.  At this low frequency without associated symptoms I do not believe C. difficile PCR testing is warranted at the moment but close observation is necessary.  If she fails to improve as anticipated over the next couple of days likely 7 C. difficile as above.    multiple falls  January 2020 prosthetic hip dislocation  April 2020 fibular fracture              Patient reports that she is satisfied with her pain options and is not taking Ultram at her allowed frequency.  We will continue as current including scheduled acetaminophen and new topicals.  Continue with PT, OT, .  Wean Ultram  when able. She is on glucosamine for osteoarthritis as well.     Stage 3 sacral wound              I did not examine today.  The staff reports a clean base.  Continue wound care orders.     Right distal thigh wound              Questionable infected hematoma as etiology.  This seems like the most plausible explanation.  May have had blood gather distally from the original hip dislocation as she did have significant blood loss anemia at that point.  She does describe a large lump without other signs of infection before rupture.  No cultures have been done so is not clear to me that it was ever truly infected.    She completed her clindamycin April 18.  Current wound care      Electrolyte disturbance              Hyponatremia, now at 134 as noted above.  Most likely related to hydrochlorothiazide.    Magnesium has recovered.     Acute blood loss anemia  Chronic macrocytic anemia              Patient did have B12 level done in 2019.  She had TSH done in January 2020.  The cause of her macrocytosis is unknown.  I do not see that she has had a peripheral smear.  Her RDW is elevated at 14.9.  Her white blood count and platelet lines are normal.  -B12 482, TSH 0.08 ferritin 335 iron 81 transferrin mildly low 193  - Anticipate she will need further outpatient work-up with reticulocyte count, peripheral smear.  May need hematology involvement depending on those results.  If her TCU stay is prolonged for any reason, we could proceed with further work-up here but I think it makes more sense to reunite with her [facility] doctor at this point     Hypertension  Hypertensive crisis in January              Patient is on lisinopril hydrochlorothiazide atenolol.  She also takes potassium.    Blood pressures remain acceptable.      CKD 3              Noted, monitor as above avoid nephrotoxins were able creatinine 1.01      Case discussed with:    Facility staff       Mor Flores MD

## 2021-06-20 NOTE — LETTER
Letter by Nazanin Gonsalves CNP at      Author: Nazanin Gonsalves CNP Service: -- Author Type: --    Filed:  Encounter Date: 2/4/2020 Status: (Other)         Patient: Mitzi De Santiago   MR Number: [id number]   YOB: 1934   Date of Visit: 2/4/2020     [facility]    Facility:   [facility] [[id number]]   Code Status: FULL CODE      CHIEF COMPLAINT/REASON FOR VISIT:  Chief Complaint   Patient presents with   ? Review Of Multiple Medical Conditions       HISTORY:      HPI: Mitzi is a 85 y.o. female undergoing physical and occupational therapy at [facility] transitional care unit.  She is with past medical history of hypertension, hematoma, chronic kidney disease stage II-III and osteoarthritis. Today[facility] PT OT therapy to evaluate for lymph wraps who presented to the emergency department for evaluation of right hip pain after mechanical fall.  She underwent a reduction under anesthesia.  She is with a history of a total right hip arthroplasty the femoral prosthesis is dislocated superiorly and no fracture.  She also had a hypertension crisis during her hospitalization per hospital report probably secondary to pain she did receive IV hydralazine.    Today she is seen as a routine visit to review multiple medical issues.  She denies chest pain or shortness of breath.  She is moving her bowels and has  no issues with urination.  She is alert and oriented x4.   Her pain is controlled.  She did follow-up with orthopedics on 1/20/2020 and she is to continue with her abductor brace for 2 more weeks orthopedics had no concerns and she will follow-up as needed.     Her CK is WNL at  48.  She is a pivot transfer and walking the bars in therapy.  Denies any congestion but reports an occasional nonproductive cough.    Past Medical History:   Diagnosis Date   ? Basal cell carcinoma 9/2000    forehead   ? Bunion    ? Cellulitis    ? Fatty liver    ? Hammer toe    ?  Hypertension    ? Open wound(s) (multiple) of unspecified site(s), without mention of complication    ? Papilloma of breast              Family History   Problem Relation Age of Onset   ? No Medical Problems Mother    ? Stroke Father    ? No Medical Problems Sister    ? No Medical Problems Daughter    ? No Medical Problems Maternal Grandmother    ? No Medical Problems Maternal Grandfather    ? No Medical Problems Paternal Grandmother    ? No Medical Problems Paternal Grandfather    ? No Medical Problems Maternal Aunt    ? No Medical Problems Paternal Aunt    ? BRCA 1/2 Neg Hx    ? Breast cancer Neg Hx    ? Cancer Neg Hx    ? Colon cancer Neg Hx    ? Endometrial cancer Neg Hx    ? Ovarian cancer Neg Hx      Social History     Socioeconomic History   ? Marital status:      Spouse name: Not on file   ? Number of children: Not on file   ? Years of education: Not on file   ? Highest education level: Not on file   Occupational History   ? Not on file   Social Needs   ? Financial resource strain: Not on file   ? Food insecurity:     Worry: Not on file     Inability: Not on file   ? Transportation needs:     Medical: Not on file     Non-medical: Not on file   Tobacco Use   ? Smoking status: Never Smoker   ? Smokeless tobacco: Never Used   Substance and Sexual Activity   ? Alcohol use: No   ? Drug use: Never   ? Sexual activity: Not on file   Lifestyle   ? Physical activity:     Days per week: Not on file     Minutes per session: Not on file   ? Stress: Not on file   Relationships   ? Social connections:     Talks on phone: Not on file     Gets together: Not on file     Attends Denominational service: Not on file     Active member of club or organization: Not on file     Attends meetings of clubs or organizations: Not on file     Relationship status: Not on file   ? Intimate partner violence:     Fear of current or ex partner: Not on file     Emotionally abused: Not on file     Physically abused: Not on file     Forced  sexual activity: Not on file   Other Topics Concern   ? Not on file   Social History Narrative    Living at home alone.  and has 1 daughter in [place].          Review of Systems   Constitutional: Positive for activity change and fatigue. Negative for appetite change and fever.   HENT: Negative for congestion.    Respiratory: Negative for cough, shortness of breath and wheezing.    Cardiovascular: Negative for chest pain and leg swelling.   Gastrointestinal: Negative for abdominal distention, abdominal pain, constipation, diarrhea and nausea.   Genitourinary: Negative for dysuria.   Musculoskeletal: Positive for arthralgias. Negative for back pain.   Skin: Positive for wound. Negative for color change.   Neurological: Negative for dizziness.   Psychiatric/Behavioral: Negative for agitation, behavioral problems and confusion.       Vitals:    02/04/20 0903   BP: 154/52   Pulse: (!) 52   Resp: 16   Temp: 97.5  F (36.4  C)   SpO2: 97%   Weight: 161 lb (73 kg)       Physical Exam  Constitutional:       Appearance: She is well-developed.      Comments: Pleasant woman in no acute distress   HENT:      Head: Normocephalic.   Eyes:      Conjunctiva/sclera: Conjunctivae normal.   Neck:      Musculoskeletal: Normal range of motion.   Cardiovascular:      Rate and Rhythm: Normal rate and regular rhythm.      Heart sounds: Normal heart sounds. No murmur.   Pulmonary:      Effort: No respiratory distress.      Breath sounds: Normal breath sounds. No wheezing or rales.   Abdominal:      General: Bowel sounds are normal. There is no distension.      Palpations: Abdomen is soft.      Tenderness: There is no abdominal tenderness.   Musculoskeletal: Normal range of motion.      Comments: A hinged hip brace  Decreased range of motion left upper extremity she reports it has been this way for years.   Skin:     General: Skin is warm.      Comments: Coccyx wound being followed by the wound care team  Rash in her abdominal folds  nystatin powder ordered   Neurological:      Mental Status: She is alert and oriented to person, place, and time.   Psychiatric:         Behavior: Behavior normal.           LABS:   No results found for this or any previous visit (from the past 240 hour(s)).    ASSESSMENT:      ICD-10-CM    1. Posterior dislocation of right hip, initial encounter (H) S73.014A    2. Pain management R52    3. Anticoagulation adequate Z79.01        PLAN:      Hypertension continue atenolol and hydrochlorothiazide blood pressure stable    Coccyx wound continue dressing changes she is being followed by the wound care team    Pain management patient reports pain controlled on scheduled Tylenol    Anticoagulation currently on 81 mg twice daily    Reduction of a right dislocated hip patient underwent reduction in the OR under anesthesia failed attempt in the ER, patient with a hinged hip brace    [facility] PT OT    Rhabdomyolysis-elevated CK on 1/6/2020 and now within normal limits at 48    Electronically signed by: Nazanin Gonsalves CNP

## 2021-06-20 NOTE — LETTER
Letter by Geni Brandon MD at      Author: Geni Brandon MD Service: -- Author Type: --    Filed:  Encounter Date: 1/30/2020 Status: (Other)         Patient: Mitzi De Santiago   MR Number: [id number]   YOB: 1934   Date of Visit: 1/30/2020     [facility]      Facility:    [facility] [[id number]]  Code Status: FULL CODE       Chief Complaint/Reason for Visit:  Chief Complaint   Patient presents with   ? H & P     Admit note to TCU-right hip dislocation s/p closed reduction, no fracture.        HPI:   Mitzi is a 85 y.o. female with hx of HTN and prior right ODETTE, presented to the hospital on 1/4/20 after a fall at home with right hip dislocation.     Hospital HPI:  Mitzi De Santiago is a 85 years old female with a past medical history of hypertension, hammer toe, and  osteoarthritis s/p total right hip arthroplasty,  who presents to this ED for evaluation of  right hip pain after a mechanical fall. Basically the patient reports a mechanical fall just prior to her arrival to the ED.  She notes losing her balance while trying to lower herself onto the toilet.  Patient states slowing doing the splits during her fall without any head injury or loss of consciousness.   She currently notes some right hip pain, upper right leg pain, and right knee pain.  Her pain is a constant 8/10, which is provoked with movement and palpation.  EMS at her care facility reported that the patient was unable to ambulate after her fall due to her significant pain.  She denies any weakness in her legs or abdominal pain.  Of note the patient uses a cane or walker at baseline. She denies any chest pain or shortness of breath.  No change in medication recently or history of recent travel.    Hospital DC Summary:  Mitzi De Santiago is a 85 y.o. female with HTN, CKD 3, OA presented after a fall found to have dislocation of her ODETTE, status post reduction     #Dislocation of right ODETTE  Patient  slipped on her bath mat at home, laid on the floor for 2 hours before neighbor came home and she could call for help. Came in found to have dislocated her right ODETTE  Underwent closed reduction under anesthesia by Dr Em on 1/4  Doing well postop  Supposed to wear hip abduction brace to be worn when out of bed with limit of 75 degrees of flexion and 15 degrees of abduction  Orthopedics advised lower dose aspirin for the time being  Follow up with orthopedics in 1-2 weeks in clinic  Patient is going to TCU    #Fall  Mechanical fall, slipped on bathmat  May benefit from home safety eval after discharge from TCU  Also needs to wear her LifeAlert which she already has but has not been wearing  With decreasing mobility recently has developed a sacral pressure ulcer and may need more help at home     #Anemia  Patient with Hgb 10.8-->7.5 here after admission. She was probably dehydrated after lying on her bathroom floor for some time, suspect 10.8 is an overestimate of her true baseline  May have had some muscular bleeding associated with her dislocation, Hgb stable now though  Did receive 2 units pRBCs here, Hgb now 9.6  Recommend Hgb recheck in clinic in a few weeks. Consider additional workup if still significantly low.     #Mild rhabdomyolysis  CK peaked at 2927, coming down now  Patient was treated with IVF  Cr was 1.3 on admission, downtrended nicely    Overall stabilized and discharged to TCU on 1/7/20 for PT, OT, nursing cares, medical management and monitoring.     Today:  Pain is well controlled. No pain in right hip. She has abductor brace, will need follow up with ortho due to restrictions. WBAT. Working  With therapy. She lives alone independent. Not ready to go home yet. Appetite is good. No diarrhea or constipation. No abdominal pain or urinary sx. No fever, cough or congestion. She denies shortness of breath or chest pain. No dizziness. No new vision or hearing problems.       Past Medical History:  Past  Medical History:   Diagnosis Date   ? Basal cell carcinoma 9/2000    forehead   ? Bunion    ? Cellulitis    ? Fatty liver    ? Hammer toe    ? Hypertension    ? Open wound(s) (multiple) of unspecified site(s), without mention of complication    ? Papilloma of breast            Surgical History:  Past Surgical History:   Procedure Laterality Date   ? BREAST BIOPSY Left     benign nipple removed   ? bunion and hammertoe Right 1992   ? CATARACT EXTRACTION  1994   ? CLOSED REDUCTION HIP DISLOCATION Right 1/4/2020    Procedure: CLOSED REDUCTION, HIP;  Surgeon: Dariel Em MD;  Location: [facility] OR;  Service: Orthopedics   ? DILATION AND CURETTAGE OF UTERUS      x2   ? TOTAL ABDOMINAL HYSTERECTOMY W/ BILATERAL SALPINGOOPHORECTOMY  1980   ? TOTAL HIP ARTHROPLASTY Right 2009       Family History:   Family History   Problem Relation Age of Onset   ? No Medical Problems Mother    ? Stroke Father    ? No Medical Problems Sister    ? No Medical Problems Daughter    ? No Medical Problems Maternal Grandmother    ? No Medical Problems Maternal Grandfather    ? No Medical Problems Paternal Grandmother    ? No Medical Problems Paternal Grandfather    ? No Medical Problems Maternal Aunt    ? No Medical Problems Paternal Aunt    ? BRCA 1/2 Neg Hx    ? Breast cancer Neg Hx    ? Cancer Neg Hx    ? Colon cancer Neg Hx    ? Endometrial cancer Neg Hx    ? Ovarian cancer Neg Hx        Social History:    Social History     Socioeconomic History   ? Marital status:      Spouse name: Not on file   ? Number of children: Not on file   ? Years of education: Not on file   ? Highest education level: Not on file   Occupational History   ? Not on file   Social Needs   ? Financial resource strain: Not on file   ? Food insecurity:     Worry: Not on file     Inability: Not on file   ? Transportation needs:     Medical: Not on file     Non-medical: Not on file   Tobacco Use   ? Smoking status: Never Smoker   ? Smokeless tobacco:  Never Used   Substance and Sexual Activity   ? Alcohol use: No   ? Drug use: Never   ? Sexual activity: Not on file   Lifestyle   ? Physical activity:     Days per week: Not on file     Minutes per session: Not on file   ? Stress: Not on file   Relationships   ? Social connections:     Talks on phone: Not on file     Gets together: Not on file     Attends [employer] service: Not on file     Active member of club or organization: Not on file     Attends meetings of clubs or organizations: Not on file     Relationship status: Not on file   ? Intimate partner violence:     Fear of current or ex partner: Not on file     Emotionally abused: Not on file     Physically abused: Not on file     Forced sexual activity: Not on file   Other Topics Concern   ? Not on file   Social History Narrative    Living at home alone.  and has 1 daughter in [place].        Medications:  Current Outpatient Medications   Medication Sig   ? acetaminophen (TYLENOL) 500 MG tablet Take 2 tablets (1,000 mg total) by mouth 3 (three) times a day.   ? aspirin 81 mg chewable tablet Chew 1 tablet (81 mg total) 2 (two) times a day.   ? atenolol (TENORMIN) 50 MG tablet Take 50 mg by mouth daily.   ? CALCIUM CARBONATE (CALCIUM 500 ORAL) Take 1 tablet by mouth daily.    ? glucosamine sulfate 500 mg cap Take 500 mg by mouth daily.   ? hydrochlorothiazide (HYDRODIURIL) 25 MG tablet Take 25 mg by mouth daily.   ? omega-3 fatty acids 1,000 mg cap Take by mouth daily.   ? polyethylene glycol (MIRALAX) 17 gram packet Take 1 packet (17 g total) by mouth daily as needed (constipation).   ? potassium chloride (KLOR-CON) 10 MEQ CR tablet Take 10 mEq by mouth daily.   ? senna-docusate (PERICOLACE) 8.6-50 mg tablet Take 1 tablet by mouth 2 (two) times a day.       Allergies:  Allergies   Allergen Reactions   ? Penicillins Nausea And Vomiting        Review of Systems:  Pertinent items are noted in HPI.      Physical Exam:   General: Patient is alert female, no  distress.   Vitals: /78, Temp 98, Pulse 60, RR 20, O2 sat 99%RA.  HEENT: Head is NCAT. Eyes show no injection or icterus. Nares negative. Oropharynx well hydrated.  Neck: Supple. No tenderness or adenopathy. No JVD.  Lungs: Clear bilaterally. No wheezes.  Cardiovascular: Regular rate and rhythm, normal S1, S2.  Back: No spinal or CVA tenderness.  Abdomen: Soft, no tenderness on exam. Bowel sounds present. No guarding rebound or rigidity.  : Deferred.  Extremities: Mild distal LE edema is noted.  Musculoskeletal: Right hip abductor brace.   Skin: Warm and dry.   Psych: Mood appears good.      Labs:  Lab Results   Component Value Date    WBC 4.7 01/06/2020    HGB 9.6 (L) 01/07/2020    HCT 21.4 (L) 01/06/2020     (H) 01/06/2020     (L) 01/06/2020     Results for orders placed or performed in visit on 01/13/20   Basic Metabolic Panel   Result Value Ref Range    Sodium 137 136 - 145 mmol/L    Potassium 4.0 3.5 - 5.0 mmol/L    Chloride 106 98 - 107 mmol/L    CO2 23 22 - 31 mmol/L    Anion Gap, Calculation 8 5 - 18 mmol/L    Glucose 92 70 - 125 mg/dL    Calcium 8.8 8.5 - 10.5 mg/dL    BUN 23 8 - 28 mg/dL    Creatinine 0.80 0.60 - 1.10 mg/dL    GFR MDRD Af Amer >60 >60 mL/min/1.73m2    GFR MDRD Non Af Amer >60 >60 mL/min/1.73m2         Assessment/Plan:  1. Right hip dislocation. Hx of prior ODETTE. No fracture. Underwent closed reduction in OR on 1/4/20. Continue with abduction brace. Follow up with ortho.   2. HTN. Continue atenolol and HCTZ. Monitor BPs in TCU, currently acceptable.   3. ABLA. Hgb down to 7.5 in the hospital. She did receive transfusion. Last hgb up to 9.6.  4. Rhabdomyolysis. Treated with IVFs. Improved.   5. PU sacral area. Wound care orders in place.  6. CKD. Labs as noted above.  7. Weakness and deconditioning. Here for therapy.   8. Code status is full code.        Total time greater than 45 minutes, greater than 50% counseling and coordination of care, time spent in interview and  examination of patient, discussion with nursing staff. CC time includes review of current orthopedic situation, restrictions, need to wear brace and follow up with ortho. Management of HTN, anemia, rhabdo with IVFs in the hospital. Sacral wound improving. Expected course of therapy in TCU. Questions answered.          Electronically signed by: Geni Brandon MD

## 2021-06-20 NOTE — LETTER
Letter by Nazanin Gonsalves CNP at      Author: Nazanin Gonsalves CNP Service: -- Author Type: --    Filed:  Encounter Date: 1/29/2020 Status: (Other)         Patient: Mitzi De Santiago   MR Number: [id number]   YOB: 1934   Date of Visit: 1/29/2020     [facility]    Facility:   [facility] [[id number]]   Code Status: FULL CODE      CHIEF COMPLAINT/REASON FOR VISIT:  Chief Complaint   Patient presents with   ? Review Of Multiple Medical Conditions       HISTORY:      HPI: Mitiz is a 85 y.o. female undergoing physical and occupational therapy at [facility] transitional care unit.  She is with past medical history of hypertension, hematoma, chronic kidney disease stage II-III and osteoarthritis. TodayDebility PT OT therapy to evaluate for lymph wraps who presented to the emergency department for evaluation of right hip pain after mechanical fall.  She underwent a reduction under anesthesia.  She is with a history of a total right hip arthroplasty the femoral prosthesis is dislocated superiorly and no fracture.  She also had a hypertension crisis during her hospitalization per hospital report probably secondary to pain she did receive IV hydralazine.    Today she is seen as a routine visit to review multiple medical issues.  She denies chest pain or shortness of breath.  She is moving her bowels and has  no issues with urination.  She is alert and oriented x4.   Her pain is controlled.  She does have a coccyx wound and being followed by the  wound nurse.   She did follow-up with orthopedics on 1/20/2020 and she is to continue with her abductor brace for 2 more weeks orthopedics had no concerns and she will follow-up as needed.   She does have lower extremity edema and wearing compression during the day.   Her CK is WNL at  48.  She is a pivot transfer and walking the bars in therapy. Her weight is up 3 pounds in the last week.     Past Medical History:    Diagnosis Date   ? Basal cell carcinoma 9/2000    forehead   ? Bunion    ? Cellulitis    ? Fatty liver    ? Hammer toe    ? Hypertension    ? Open wound(s) (multiple) of unspecified site(s), without mention of complication    ? Papilloma of breast              Family History   Problem Relation Age of Onset   ? No Medical Problems Mother    ? Stroke Father    ? No Medical Problems Sister    ? No Medical Problems Daughter    ? No Medical Problems Maternal Grandmother    ? No Medical Problems Maternal Grandfather    ? No Medical Problems Paternal Grandmother    ? No Medical Problems Paternal Grandfather    ? No Medical Problems Maternal Aunt    ? No Medical Problems Paternal Aunt    ? BRCA 1/2 Neg Hx    ? Breast cancer Neg Hx    ? Cancer Neg Hx    ? Colon cancer Neg Hx    ? Endometrial cancer Neg Hx    ? Ovarian cancer Neg Hx      Social History     Socioeconomic History   ? Marital status:      Spouse name: Not on file   ? Number of children: Not on file   ? Years of education: Not on file   ? Highest education level: Not on file   Occupational History   ? Not on file   Social Needs   ? Financial resource strain: Not on file   ? Food insecurity:     Worry: Not on file     Inability: Not on file   ? Transportation needs:     Medical: Not on file     Non-medical: Not on file   Tobacco Use   ? Smoking status: Never Smoker   ? Smokeless tobacco: Never Used   Substance and Sexual Activity   ? Alcohol use: No   ? Drug use: Never   ? Sexual activity: Not on file   Lifestyle   ? Physical activity:     Days per week: Not on file     Minutes per session: Not on file   ? Stress: Not on file   Relationships   ? Social connections:     Talks on phone: Not on file     Gets together: Not on file     Attends Je[employer]'s witness service: Not on file     Active member of club or organization: Not on file     Attends meetings of clubs or organizations: Not on file     Relationship status: Not on file   ? Intimate partner violence:      Fear of current or ex partner: Not on file     Emotionally abused: Not on file     Physically abused: Not on file     Forced sexual activity: Not on file   Other Topics Concern   ? Not on file   Social History Narrative    Living at home alone.  and has 1 daughter in [place].          Review of Systems   Constitutional: Positive for activity change and fatigue. Negative for appetite change and fever.   HENT: Negative for congestion.    Respiratory: Negative for cough, shortness of breath and wheezing.    Cardiovascular: Negative for chest pain and leg swelling.   Gastrointestinal: Negative for abdominal distention, abdominal pain, constipation, diarrhea and nausea.   Genitourinary: Negative for dysuria.   Musculoskeletal: Positive for arthralgias. Negative for back pain.   Skin: Positive for wound. Negative for color change.   Neurological: Negative for dizziness.   Psychiatric/Behavioral: Negative for agitation, behavioral problems and confusion.       Vitals:    01/29/20 0750   BP: 134/60   Pulse: (!) 54   Resp: 18   Temp: 98.6  F (37  C)   SpO2: 97%   Weight: 163 lb 6.4 oz (74.1 kg)       Physical Exam  Constitutional:       Appearance: She is well-developed.      Comments: Pleasant woman in no acute distress   HENT:      Head: Normocephalic.   Eyes:      Conjunctiva/sclera: Conjunctivae normal.   Neck:      Musculoskeletal: Normal range of motion.   Cardiovascular:      Rate and Rhythm: Normal rate and regular rhythm.      Heart sounds: Normal heart sounds. No murmur.   Pulmonary:      Effort: No respiratory distress.      Breath sounds: Normal breath sounds. No wheezing or rales.   Abdominal:      General: Bowel sounds are normal. There is no distension.      Palpations: Abdomen is soft.      Tenderness: There is no abdominal tenderness.   Musculoskeletal: Normal range of motion.      Comments: A hinged hip brace  Decreased range of motion left upper extremity she reports it has been this way for years.    Skin:     General: Skin is warm.      Comments: Coccyx wound being followed by the wound care team  Rash in her abdominal folds nystatin powder ordered   Neurological:      Mental Status: She is alert and oriented to person, place, and time.   Psychiatric:         Behavior: Behavior normal.           LABS:   No results found for this or any previous visit (from the past 240 hour(s)).    ASSESSMENT:      ICD-10-CM    1. Traumatic rhabdomyolysis, subsequent encounter T79.6XXD    2. Accelerated hypertension I10    3. Posterior dislocation of right hip, initial encounter (H) S73.014A    4. Age-related physical debility R54        PLAN:      Hypertension continue atenolol and hydrochlorothiazide blood pressure stable    Coccyx wound continue dressing changes she is being followed by the wound care team    Pain management patient reports pain controlled on scheduled Tylenol    Anticoagulation currently on 81 mg twice daily    Reduction of a right dislocated hip patient underwent reduction in the OR under anesthesia failed attempt in the ER, patient with a hinged hip brace    Debility PT OT    Rhabdomyolysis-elevated CK on 1/6/2020 and now within normal limits at 48    Electronically signed by: Nazanin Gonsalves CNP

## 2021-06-20 NOTE — LETTER
Letter by Edward Calixto CNP at      Author: Edward Calixto CNP Service: -- Author Type: --    Filed:  Encounter Date: 2020 Status: (Other)         Patient: Mitzi De Santiago   MR Number: [id number]   YOB: 1934   Date of Visit: 2020          [facility]    NAME:  Mitzi De Santiago             :  1934  MRN: [id number]  CODE STATUS:  DNR    FACILITY:  [facility] [[id number]]         Chief Complaint   Patient presents with   ? Problem Visit     Right lower extremity sebaceous cysts     HISTORY OF PRESENT ILLNESS: Mitzi De Santiago is a 85 y.o. female with CKD 3, Chronic anemia, Cognitive impairment, Posterior hip dislocation in March - s/p surgical treatment who was sent to the hospital by the recommendations of the home care RN for evaluation of a right thigh wound/abscess.  She failed outpatient Keflex regimen.  She also had left ankle pain sustained after a mechanical fall at home.  Imaging revealed a fracture which required no surgical treatment per Orthopedics.    Today, patient is seen at the bedside.  She is WBAT in CAM boot. Pain is controlled with Tylenol and prn Tramadol.  Right leg chronic wound/possible abscess was treated/completed with a 7 day course of Clindamycin and UTI treated with a 5 day course Ceftriaxone.  Chronic anemia with las HgB 8.6. No active bleeding.     Past Medical History:   Diagnosis Date   ? Basal cell carcinoma 2000    forehead   ? Bunion    ? Cellulitis    ? Fatty liver    ? Hammer toe    ? Hypertension    ? Open wound(s) (multiple) of unspecified site(s), without mention of complication    ? Papilloma of breast      Past Surgical History:   Procedure Laterality Date   ? BREAST BIOPSY Left     benign nipple removed   ? bunion and hammertoe Right    ? CATARACT EXTRACTION     ? CLOSED REDUCTION HIP DISLOCATION Right 2020    Procedure: CLOSED REDUCTION, HIP;  Surgeon: Dariel Em,  MD;  Location: [facility] Main OR;  Service: Orthopedics   ? DILATION AND CURETTAGE OF UTERUS      x2   ? TOTAL ABDOMINAL HYSTERECTOMY W/ BILATERAL SALPINGOOPHORECTOMY  1980   ? TOTAL HIP ARTHROPLASTY Right 2009     Family History   Problem Relation Age of Onset   ? No Medical Problems Mother    ? Stroke Father    ? No Medical Problems Sister    ? No Medical Problems Daughter    ? No Medical Problems Maternal Grandmother    ? No Medical Problems Maternal Grandfather    ? No Medical Problems Paternal Grandmother    ? No Medical Problems Paternal Grandfather    ? No Medical Problems Maternal Aunt    ? No Medical Problems Paternal Aunt    ? BRCA 1/2 Neg Hx    ? Breast cancer Neg Hx    ? Cancer Neg Hx    ? Colon cancer Neg Hx    ? Endometrial cancer Neg Hx    ? Ovarian cancer Neg Hx      Social History     Socioeconomic History   ? Marital status:      Spouse name: Not on file   ? Number of children: Not on file   ? Years of education: Not on file   ? Highest education level: Not on file   Occupational History   ? Not on file   Social Needs   ? Financial resource strain: Not on file   ? Food insecurity     Worry: Not on file     Inability: Not on file   ? Transportation needs     Medical: Not on file     Non-medical: Not on file   Tobacco Use   ? Smoking status: Never Smoker   ? Smokeless tobacco: Never Used   Substance and Sexual Activity   ? Alcohol use: No   ? Drug use: Never   ? Sexual activity: Not on file   Lifestyle   ? Physical activity     Days per week: Not on file     Minutes per session: Not on file   ? Stress: Not on file   Relationships   ? Social connections     Talks on phone: Not on file     Gets together: Not on file     Attends L[employer]-day service: Not on file     Active member of club or organization: Not on file     Attends meetings of clubs or organizations: Not on file     Relationship status: Not on file   ? Intimate partner violence     Fear of current or ex partner: Not on file      Emotionally abused: Not on file     Physically abused: Not on file     Forced sexual activity: Not on file   Other Topics Concern   ? Not on file   Social History Narrative    Living at home alone.  and has 1 daughter in [place].      Allergies   Allergen Reactions   ? Penicillins Nausea And Vomiting     Current Outpatient Medications   Medication Sig Dispense Refill   ? acetaminophen (TYLENOL) 325 MG tablet Take by mouth every 4 (four) hours as needed for pain.     ? ascorbic acid, vitamin C, (VITAMIN C) 500 MG tablet Take 500 mg by mouth daily.     ? aspirin 81 mg chewable tablet Chew 1 tablet (81 mg total) 2 (two) times a day.  0   ? atenolol (TENORMIN) 50 MG tablet Take 25 mg by mouth daily.      ? CALCIUM CARBONATE (CALCIUM 500 ORAL) Take 1 tablet by mouth daily.      ? ferrous sulfate 325 (65 FE) MG tablet Take 1 tablet by mouth daily with breakfast.     ? glucosamine sulfate 500 mg cap Take 500 mg by mouth daily.     ? hydrochlorothiazide (HYDRODIURIL) 25 MG tablet Take 25 mg by mouth daily.     ? lisinopriL (PRINIVIL,ZESTRIL) 10 MG tablet Take 1 tablet (10 mg total) by mouth daily. 30 tablet 0   ? menthol (BIOFREEZE, MENTHOL,) 4 % Gel Apply 1 application topically 3 (three) times a day as needed. (apply to knees and shoulders)     ? nystatin (MYCOSTATIN) powder Apply 1 application topically 2 (two) times a day as needed.      ? omega-3 fatty acids 1,000 mg cap Take 1,000 mg by mouth daily.      ? polyethylene glycol (MIRALAX) 17 gram packet Take 1 packet (17 g total) by mouth daily as needed (constipation).  0   ? potassium chloride (KLOR-CON) 10 MEQ CR tablet Take 10 mEq by mouth daily.       No current facility-administered medications for this visit.        REVIEW OF SYSTEMS:    Currently, no fever, chills, or rigors. Does not have any visual or hearing problems. Denies any chest pain, headaches, palpitations, lightheadedness, dizziness, shortness of breath, or cough. Appetite is good. Denies any  GERD symptoms. Denies any difficulty with swallowing, nausea, or vomiting.  Denies any abdominal pain, diarrhea or constipation. Denies any urinary symptoms. No insomnia. No active bleeding. Abscess of right lower extremity.       PHYSICAL EXAMINATION:  Vitals:    04/20/20 1105   BP: 92/60   Pulse: 73   Resp: 18   Temp: 98.3  F (36.8  C)   SpO2: 96%   Weight: 168 lb 11.2 oz (76.5 kg)       GENERAL: Awake, Alert, oriented x3, not in any form of acute distress, answers questions appropriately, follows simple commands, conversant  HEENT: Head is normocephalic with normal hair distribution. No evidence of trauma. Ears: No acute purulent discharge. Eyes: Conjunctivae pink with no scleral jaundice. Nose: Normal mucosa and septum. NECK: Supple with no cervical or supraclavicular lymphadenopathy. Trachea is midline.   CHEST: No tenderness or deformity, no crepitus  LUNG: Clear to auscultation with good chest expansion. There are no crackles or wheezes, normal AP diameter.  BACK: No kyphosis of the thoracic spine. Symmetric, no curvature, ROM normal, no CVA tenderness, no spinal tenderness   CVS: There is good S1  S2, there are no murmurs, rubs, gallops, or heaves, rhythm is regular,  2+ pulses symmetric in all extremities.  ABDOMEN: Globular and soft, nontender to palpation, non distended, no masses, no organomegaly, good bowel sounds, no rebound or guarding, no peritoneal signs.   EXTREMITIES:  Limited range of motion of left lower extremity, there is no tenderness to palpation, no pedal edema, no cyanosis or clubbing, no calf tenderness.  Pulses equal in all extremities, normal cap refill, no joint swelling.  SKIN:   Abscess of right leg.  Pressure ulcer of coccygeal region, stage 3.  NEUROLOGICAL: The patient is oriented to person, place and time. Strength and sensation are grossly intact. Face is symmetric.    LABS:     Lab Results   Component Value Date    WBC 5.9 04/17/2020    HGB 10.0 (L) 04/27/2020    HCT 26.5 (L)  04/17/2020     (H) 04/17/2020     04/17/2020     Results for orders placed or performed in visit on 04/17/20   Basic Metabolic Panel   Result Value Ref Range    Sodium 133 (L) 136 - 145 mmol/L    Potassium 4.9 3.5 - 5.0 mmol/L    Chloride 102 98 - 107 mmol/L    CO2 22 22 - 31 mmol/L    Anion Gap, Calculation 9 5 - 18 mmol/L    Glucose 92 70 - 125 mg/dL    Calcium 8.7 8.5 - 10.5 mg/dL    BUN 42 (H) 8 - 28 mg/dL    Creatinine 1.01 0.60 - 1.10 mg/dL    GFR MDRD Af Amer >60 >60 mL/min/1.73m2    GFR MDRD Non Af Amer 52 (L) >60 mL/min/1.73m2     Lab Results   Component Value Date    HGBA1C 5.4 04/04/2020     No results found for: [id number]  Lab Results   Component Value Date    [id number] 482 04/23/2020       ASSESSMENT/PLAN:    1. Closed fracture of left ankle with routine healing, subsequent encounter - Evaluated by Ortho - no intervention at this time,  WBAT in CAM boot, pain controlled with  scheduled Tylenol and prn Tramadol.   2. Abscess of right leg - Completed IV Clindamycin and Ceftriaxone due to high CRP. She completed 7 more days of oral Clindamycin   3. Sebaceous cyst - Will continue to monoitor   4. Anemia due to stage 3 chronic kidney disease (H) -  Last Hgb 8.5, will continue Ferrous sulfate and Ascorbic acid   5. Pressure ulcer of coccygeal region, stage 3 (H)  - Seen by WOC team in the hospital, will follow those treatment orders and adjust as needed.             Electronically signed by:  Edward Calixto CNP    Total time spent on the unit was 40 minutes of which 25 minutes was spent in counseling Abscess/Antibiotic & Probiotic therapy, Safety precautions, Anemia/Labs and coordination of care of the above plan with nursing staff, patient, and therapy.

## 2021-06-28 NOTE — PROGRESS NOTES
Progress Notes by Nazanin Gonsalves CNP at 3/11/2020  9:08 AM     Author: Nazanin Gonsalves CNP Service: -- Author Type: Nurse Practitioner    Filed: 3/14/2020  9:06 PM Encounter Date: 3/11/2020 Status: Attested    : Nazanin Gonsalves CNP (Nurse Practitioner) Cosigner: Geni Brandon MD at 3/15/2020  5:57 PM    Attestation signed by Geni Brandon MD at 3/15/2020  5:57 PM      Electronically signed by: Geni Brandon MD                  [facility]    Facility:   [facility] [[id number]]   Code Status: FULL CODE  PCP: Jerry Araujo MD   Phone: [phone]   Fax: [phone]      CHIEF COMPLAINT/REASON FOR VISIT:  Chief Complaint   Patient presents with   ? Discharge Summary       HISTORY COURSE:  Mitzi is a 85 y.o. female undergoing physical and occupational therapy at [facility] transitional care unit.  She is with past medical history of hypertension, hematoma, chronic kidney disease stage II-III and osteoarthritis. Today[facility]ility PT OT therapy to evaluate for lymph wraps who presented to the emergency department for evaluation of right hip pain after mechanical fall.  She underwent a reduction under anesthesia.  She is with a history of a total right hip arthroplasty the femoral prosthesis is dislocated superiorly and no fracture.  She also had a hypertension crisis during her hospitalization per hospital report probably secondary to pain she did receive IV hydralazine.     Today she is seen for  a face-to-face for discharge.  Patient will discharge to home today with current medications and treatments.  She will also have [facility] home care services PT OT RN home health aide and a .  It is recommended that patient have 24-hour care and currently her kids are in town from [place] however it is unclear how long they will be in town.  A [facility] report to be filed by facility  patient continues to have cellulitis right.   medial thigh.  It was recommended she use warm packs for 20-minute increments at home and to follow-up with her primary MD. she is currently completing a course of Keflex.  A RLE US and Uric acid were both negative.  She was started on Keflex three times a day per renal dosing. She was recently treated for a positive UTI with accompanying fevers.   Her culture was >100,00 E Coli and she was treated with bactrim DS x 3 days. She denies  pain with urination. Her LS were clear and she had no cough .    She denies chest pain or shortness of breath.  She is moving her bowels.  She is alert and oriented x4.   She denies pain  She is no longer wearing the abductor brace but is with hip precautions.  She is ambulating with a walker. Today she is afebrile at 97.1.      Review of Systems  Constitutional: Positive for activity change and fatigue. Negative for appetite change and fever.   HENT: Negative for congestion.    Respiratory: Negative for cough, shortness of breath and wheezing.    Cardiovascular: Negative for chest pain and leg swelling.   Gastrointestinal: Negative for abdominal distention, abdominal pain, constipation, diarrhea and nausea.        Pt with an umbilical hernia    Genitourinary: Negative for dysuria.   Musculoskeletal: Positive for arthralgias. Negative for back pain.   Skin: Positive for wound. Negative for color change.   Neurological: Negative for dizziness.   Psychiatric/Behavioral: Negative for agitation, behavioral problems and confusion.   Vitals:    03/11/20 0909   BP: 151/63   Pulse: 64   Resp: 18   Temp: 97.1  F (36.2  C)   SpO2: 97%   Weight: 162 lb 3.2 oz (73.6 kg)       Physical Exam  Constitutional:       Appearance: She is well-developed.      Comments: Pleasant woman in no acute distress   HENT:      Head: Normocephalic.   Eyes:      Conjunctiva/sclera: Conjunctivae normal.   Neck:      Musculoskeletal: Normal range of motion.   Cardiovascular:      Rate and Rhythm: Normal rate and  regular rhythm.      Heart sounds: Normal heart sounds. No murmur.   Pulmonary:      Effort: No respiratory distress.      Breath sounds: Normal breath sounds. No wheezing or rales.   Abdominal:      General: Bowel sounds are normal. There is no distension.      Palpations: Abdomen is soft.      Tenderness: There is no abdominal tenderness.   Musculoskeletal: Normal range of motion.      Comments:   Decreased range of motion left upper extremity she reports it has been this way for years.   Skin:     General: Skin is warm.   Redness and warmth right medial  distal thigh      Neurological:      Mental Status: She is alert and oriented to person, place, and time.   Psychiatric:         Behavior: Behavior normal.  MEDICATION LIST:  Current Outpatient Medications   Medication Sig   ? acetaminophen (TYLENOL) 500 MG tablet Take 2 tablets (1,000 mg total) by mouth 3 (three) times a day.   ? aspirin 81 mg chewable tablet Chew 1 tablet (81 mg total) 2 (two) times a day.   ? atenolol (TENORMIN) 50 MG tablet Take 50 mg by mouth daily.   ? CALCIUM CARBONATE (CALCIUM 500 ORAL) Take 1 tablet by mouth daily.    ? cephalexin (KEFLEX) 500 MG capsule Take 500 mg by mouth 3 (three) times a day.   ? glucosamine sulfate 500 mg cap Take 500 mg by mouth daily.   ? hydrochlorothiazide (HYDRODIURIL) 25 MG tablet Take 25 mg by mouth daily.   ? nystatin (MYCOSTATIN) powder Apply 1 application topically 2 (two) times a day.   ? omega-3 fatty acids 1,000 mg cap Take by mouth daily.   ? polyethylene glycol (MIRALAX) 17 gram packet Take 1 packet (17 g total) by mouth daily as needed (constipation).   ? potassium chloride (KLOR-CON) 10 MEQ CR tablet Take 10 mEq by mouth daily.   ? senna-docusate (PERICOLACE) 8.6-50 mg tablet Take 1 tablet by mouth 2 (two) times a day.       DISCHARGE DIAGNOSIS:    ICD-10-CM    1. Traumatic rhabdomyolysis, subsequent encounter  T79.6XXD    2. Cellulitis of right lower extremity  L03.115        MEDICAL EQUIPMENT  NEEDS:  None     DISCHARGE PLAN/FACE TO FACE:  I certify that services are/were furnished while this patient was under the care of a physician and that a physician or an allowed non-physician practitioner (NPP), had a face-to-face encounter that meets the physician face-to-face encounter requirements. The encounter was in whole, or in part, related to the primary reason for home health. The patient is confined to his/her home and needs intermittent skilled nursing, physical therapy, speech-language pathology, or the continued need for occupational therapy. A plan of care has been established by a physician and is periodically reviewed by a physician.  Date of Face-to-Face Encounter: 3/11/20    I certify that, based on my findings, the following services are medically necessary home health services: PT OT RN home health aide and a     My clinical findings support the need for the above skilled services because: PT OT for continued strength and endurance, home health aide to assist with activities of daily living, RN for vital signs and medication management also monitoring of right knee ADLs cellulitis,  to assist with community resources.    This patient is homebound because: She is deconditioned and only able to ambulate 250 feet, she also requires 24-hour care.    The patient is, or has been, under my care and I have initiated the establishment of the plan of care. This patient will be followed by a physician who will periodically review the plan of care.    Schedule follow up visit with primary care provider within 7 days to reestablish care.    Electronically signed by: Nazanin Gonsalves CNP

## 2021-06-29 NOTE — PROGRESS NOTES
Progress Notes by Danelle Pandey CNP at 5/7/2020 12:21 PM     Author: Danelle Pandey CNP Service: -- Author Type: Nurse Practitioner    Filed: 5/26/2020  3:27 PM Encounter Date: 5/7/2020 Status: Attested Addendum    : Danelle Pandey CNP (Nurse Practitioner)    Related Notes: Original Note by Danelle Pandey CNP (Nurse Practitioner) filed at 5/7/2020 12:28 PM    Cosigner: Mor Flores MD at 5/26/2020  3:41 PM    Attestation signed by Mor Flores MD at 5/26/2020  3:41 PM    sro                Code Status:  DNR  Visit Type: Problem Visit (HTN over night, weight loss)     Facility:  [facility] [[id number]]        Facility Type: SNF (Skilled Nursing Facility, TCU)    History of Present Illness: Mitzi De Santiago is a 85 y.o. female with a past medical history for CKD, chronic anemia, mild cognitive impairment, hypertension, osteoarthritis, multiple chronic wounds.  She was recently hospitalized 5/3/2020 to 5/10/2020 after a fall at home.  She had been hospitalized in January 2020 in which she also had a fall resulting in dislocation of her prosthetic hip.  She was taken to the OR for closed reduction.  She did have a hypertensive crisis likely related to pain and anemia with a hemoglobin at 7.5.  She was given PRBCs.  It was also discovered that she had a coccyx wound that was treated.  She then was discharged to [facility] in in January in which she developed a red distal thigh and DVT was ruled out by Doppler.  She was placed on Keflex considering likely cellulitis.  She was then discharged to home against TCU's recommendations as they felt she needed 24-hour care and family could not provide this.  During the most recent hospitalization she was found to have a left distal fibular fracture.  She was evaluated by orthopedics and they recommended weightbearing as tolerated with a cam boot.  Her pain was controlled with Tylenol and tramadol.  Her hemoglobin was stable at  8.6.  She also was found to have a right thigh wound with possible abscess.  Blood cultures were negative and the wound was treated and she was placed on clindamycin.  Doppler of right leg was negative for DVT.  She also was treated with ceftriaxone for 5 days for an E. coli UTI.    Today, I note that she had a blood pressure check in the middle of the night last night and it resulted as 171/65.  Her heart rate range has shown improvement with a reduction in atenolol, being 61-65.  It appears that her atenolol is still being held a couple of days due to her heart rate being less than 65.  Her recheck blood pressure this morning was 115/59 and her heart rate was 73.  She denies any symptoms of dizziness, headaches or palpitations.  Her weight this week is down again 249 pounds but this could likely be related to the significant diarrhea she had over the past 2 weeks.  She reports that her appetite continues to be good and she has been refusing arginate and nutritional supplement as she feels this was causing her diarrhea.  She reports that her stools have now stiffened up and she is going normally.  She denies any pain or discomfort or calf tenderness.  Her left lower extremity has good pulses however does have soft nonpitting edema.    Review of Systems   Patient denies fever, chills, headache, lightheadedness, dizziness, rhinorrhea, cough, congestion, shortness of breath, chest pain, palpitations, abdominal pain, n/v, diarrhea, constipation, change in appetite, dysuria, frequency, burning or pain with urination.  Other than stated in HPI all other review of systems is negative.           Physical Exam   Vital signs: /59, heart rate 73, respirations 16, temp 98.2.  GENERAL APPEARANCE: Well developed, well nourished, in no acute distress.  HEENT: normocephalic, atraumatic, sclerae anicteric, conjunctivae clear and moist, EOM intact  LUNGS: respiratory effort normal.  CARD: Heart tones were not auscultated in  order to maintain social distancing during the COVID crisis.  EXTREMITIES: Soft nonpitting edema left greater than right mostly on the feet.  No calf tenderness  NEURO: Alert and oriented x 3.Face is symmetric.  SKIN: nursing reports the wounds are improving with current wound cares.   PSYCH: euthymic          Labs:    No results found for this or any previous visit (from the past 240 hour(s)).      Assessment:  1. Bradycardia     2. Essential hypertension     3. Loose stools     4. Closed fracture of distal end of left fibula with routine healing, unspecified fracture morphology, subsequent encounter     5. Weight loss         Plan:   Bradycardia: Showing improvement after decreasing atenolol.  We will continue with atenolol at 25 mg daily.  Could consider discontinuing this altogether in the future and likely increasing her lisinopril if her blood pressures go up.  We will continue to monitor at this time.    Hypertension: Elevated blood pressure was likely incidental during a bowel movement and so we will continue to monitor.  Continue lisinopril, hydrochlorothiazide and atenolol.    Loose stools: Resolved    Fracture of left fibula: Stable ambulating well with no pain.  Could consider some compression in the future does wear an ankle wrap right at this point.    Weight loss: Likely is related to diarrhea over the past couple weeks however will have dietitian do calorie counts and assure that she has adequate nutrition as she is refusing to take any nutritional supplements at this time.          Electronically signed by: Danelle Pandey CNP

## 2021-07-13 ENCOUNTER — RECORDS - HEALTHEAST (OUTPATIENT)
Dept: ADMINISTRATIVE | Facility: CLINIC | Age: 86
End: 2021-07-13

## 2021-07-21 ENCOUNTER — RECORDS - HEALTHEAST (OUTPATIENT)
Dept: ADMINISTRATIVE | Facility: CLINIC | Age: 86
End: 2021-07-21

## 2021-07-22 ENCOUNTER — RECORDS - HEALTHEAST (OUTPATIENT)
Dept: SCHEDULING | Facility: CLINIC | Age: 86
End: 2021-07-22

## 2021-07-22 DIAGNOSIS — Z12.31 OTHER SCREENING MAMMOGRAM: ICD-10-CM

## 2022-02-17 PROBLEM — L89.153 PRESSURE INJURY OF SACRAL REGION, STAGE 3 (H): Status: ACTIVE | Noted: 2020-01-06

## 2023-12-05 NOTE — TELEPHONE ENCOUNTER
Detail Level: Detailed
[facility] Nurse Triage Telephone Note      Provider: TRE Francois  Facility: [facility]    Facility Type: TCU    Caller: Joaquim  Call Back Number:  [phone]    Allergies: Penicillins    Reason for call: Lab results. On Fe 1 daily.     Verbal Order/Direction given by Provider: MARCOS    Provider giving order: TRE Francois    Verbal order given to: Joaquim Wyatt RN      
Number Of Curettages: 1
Size Of Lesion In Cm: 1.4
Add Intralesional Injection: No
Concentration (Mg/Ml Or Millions Of Plaque Forming Units/Cc): 0.01
Anesthesia Type: 1% lidocaine with epinephrine and a 1:10 solution of 8.4% sodium bicarbonate
Anesthesia Volume In Cc: 0.5
Cautery Type: electrodesiccation
What Was Performed First?: Curettage
Final Size Statement: The size of the lesion after curettage was
Additional Information: (Optional): The wound was cleaned, and a pressure dressing was applied.  The patient received detailed post-op instructions.
Consent was obtained from the patient. The risks and benefits were discussed. Specifically, the risks of infection, scarring, bleeding.  Prior to the procedure, the treatment site was confirmed by the patient.
Post-Care Instructions: Reviewed post-care instructions. Keep the biopsy site dry overnight, and then apply Vaseline and bandage daily until healed.
Bill As A Line Item Or As Units: Line Item